# Patient Record
Sex: MALE | Race: WHITE | NOT HISPANIC OR LATINO | Employment: OTHER | URBAN - NONMETROPOLITAN AREA
[De-identification: names, ages, dates, MRNs, and addresses within clinical notes are randomized per-mention and may not be internally consistent; named-entity substitution may affect disease eponyms.]

---

## 2017-12-13 ENCOUNTER — APPOINTMENT (OUTPATIENT)
Dept: GENERAL RADIOLOGY | Facility: HOSPITAL | Age: 72
End: 2017-12-13

## 2017-12-13 ENCOUNTER — HOSPITAL ENCOUNTER (INPATIENT)
Facility: HOSPITAL | Age: 72
LOS: 7 days | Discharge: NURSING, REHAB OR LONG-TERM CARE | End: 2017-12-20
Attending: EMERGENCY MEDICINE | Admitting: FAMILY MEDICINE

## 2017-12-13 DIAGNOSIS — J44.1 COPD EXACERBATION (HCC): ICD-10-CM

## 2017-12-13 DIAGNOSIS — R77.8 ELEVATED TROPONIN: Primary | ICD-10-CM

## 2017-12-13 LAB
6-ACETYL MORPHINE: NEGATIVE
A-A DO2: 14.8 MMHG (ref 0–300)
ALBUMIN SERPL-MCNC: 4.3 G/DL (ref 3.4–4.8)
ALBUMIN/GLOB SERPL: 1.4 G/DL (ref 1.5–2.5)
ALP SERPL-CCNC: 75 U/L (ref 40–129)
ALT SERPL W P-5'-P-CCNC: 41 U/L (ref 10–44)
AMPHET+METHAMPHET UR QL: NEGATIVE
ANION GAP SERPL CALCULATED.3IONS-SCNC: 9.3 MMOL/L (ref 3.6–11.2)
ARTERIAL PATENCY WRIST A: POSITIVE
AST SERPL-CCNC: 40 U/L (ref 10–34)
ATMOSPHERIC PRESS: 719 MMHG
BACTERIA UR QL AUTO: NORMAL /HPF
BARBITURATES UR QL SCN: NEGATIVE
BASE EXCESS BLDA CALC-SCNC: -3.3 MMOL/L
BASOPHILS # BLD AUTO: 0.03 10*3/MM3 (ref 0–0.3)
BASOPHILS NFR BLD AUTO: 0.2 % (ref 0–2)
BDY SITE: ABNORMAL
BENZODIAZ UR QL SCN: NEGATIVE
BILIRUB SERPL-MCNC: 0.4 MG/DL (ref 0.2–1.8)
BILIRUB UR QL STRIP: NEGATIVE
BNP SERPL-MCNC: 827 PG/ML (ref 0–100)
BODY TEMPERATURE: 98.6 C
BUN BLD-MCNC: 19 MG/DL (ref 7–21)
BUN/CREAT SERPL: 19.6 (ref 7–25)
BUPRENORPHINE SERPL-MCNC: NEGATIVE NG/ML
CALCIUM SPEC-SCNC: 8.6 MG/DL (ref 7.7–10)
CANNABINOIDS SERPL QL: NEGATIVE
CHLORIDE SERPL-SCNC: 109 MMOL/L (ref 99–112)
CK MB SERPL-CCNC: 3.72 NG/ML (ref 0–5)
CK MB SERPL-RTO: 5.7 % (ref 0–3)
CK SERPL-CCNC: 65 U/L (ref 24–204)
CLARITY UR: CLEAR
CO2 SERPL-SCNC: 21.7 MMOL/L (ref 24.3–31.9)
COCAINE UR QL: NEGATIVE
COHGB MFR BLD: 1.2 % (ref 0–5)
COLOR UR: YELLOW
CREAT BLD-MCNC: 0.97 MG/DL (ref 0.43–1.29)
D-LACTATE SERPL-SCNC: 2 MMOL/L (ref 0.5–2)
DEPRECATED RDW RBC AUTO: 45.2 FL (ref 37–54)
EOSINOPHIL # BLD AUTO: 0.3 10*3/MM3 (ref 0–0.7)
EOSINOPHIL NFR BLD AUTO: 2.3 % (ref 0–7)
ERYTHROCYTE [DISTWIDTH] IN BLOOD BY AUTOMATED COUNT: 13.3 % (ref 11.5–14.5)
FLUAV AG NPH QL: NEGATIVE
FLUBV AG NPH QL IA: NEGATIVE
GFR SERPL CREATININE-BSD FRML MDRD: 77 ML/MIN/1.73
GLOBULIN UR ELPH-MCNC: 3 GM/DL
GLUCOSE BLD-MCNC: 125 MG/DL (ref 70–110)
GLUCOSE UR STRIP-MCNC: NEGATIVE MG/DL
HCO3 BLDA-SCNC: 22.6 MMOL/L (ref 22–26)
HCT VFR BLD AUTO: 47.8 % (ref 42–52)
HCT VFR BLD CALC: 50 % (ref 42–52)
HGB BLD-MCNC: 16.1 G/DL (ref 14–18)
HGB BLDA-MCNC: 16.9 G/DL (ref 12–16)
HGB UR QL STRIP.AUTO: NEGATIVE
HOROWITZ INDEX BLD+IHG-RTO: 21 %
HYALINE CASTS UR QL AUTO: NORMAL /LPF
IMM GRANULOCYTES # BLD: 0.03 10*3/MM3 (ref 0–0.03)
IMM GRANULOCYTES NFR BLD: 0.2 % (ref 0–0.5)
KETONES UR QL STRIP: NEGATIVE
LEUKOCYTE ESTERASE UR QL STRIP.AUTO: NEGATIVE
LYMPHOCYTES # BLD AUTO: 4.56 10*3/MM3 (ref 1–3)
LYMPHOCYTES NFR BLD AUTO: 35.2 % (ref 16–46)
MCH RBC QN AUTO: 32.3 PG (ref 27–33)
MCHC RBC AUTO-ENTMCNC: 33.7 G/DL (ref 33–37)
MCV RBC AUTO: 96 FL (ref 80–94)
METHADONE UR QL SCN: NEGATIVE
METHGB BLD QL: 0.3 % (ref 0–3)
MODALITY: ABNORMAL
MONOCYTES # BLD AUTO: 0.8 10*3/MM3 (ref 0.1–0.9)
MONOCYTES NFR BLD AUTO: 6.2 % (ref 0–12)
NEUTROPHILS # BLD AUTO: 7.24 10*3/MM3 (ref 1.4–6.5)
NEUTROPHILS NFR BLD AUTO: 55.9 % (ref 40–75)
NITRITE UR QL STRIP: NEGATIVE
OPIATES UR QL: NEGATIVE
OSMOLALITY SERPL CALC.SUM OF ELEC: 283.1 MOSM/KG (ref 273–305)
OXYCODONE UR QL SCN: NEGATIVE
OXYHGB MFR BLDV: 93.4 % (ref 85–100)
PCO2 BLDA: 43.2 MM HG (ref 35–45)
PCP UR QL SCN: NEGATIVE
PH BLDA: 7.34 PH UNITS (ref 7.35–7.45)
PH UR STRIP.AUTO: <=5 [PH] (ref 5–8)
PLATELET # BLD AUTO: 186 10*3/MM3 (ref 130–400)
PMV BLD AUTO: 10.3 FL (ref 6–10)
PO2 BLDA: 74.6 MM HG (ref 80–100)
POTASSIUM BLD-SCNC: 4.2 MMOL/L (ref 3.5–5.3)
PROT SERPL-MCNC: 7.3 G/DL (ref 6–8)
PROT UR QL STRIP: ABNORMAL
RBC # BLD AUTO: 4.98 10*6/MM3 (ref 4.7–6.1)
RBC # UR: NORMAL /HPF
REF LAB TEST METHOD: NORMAL
SAO2 % BLDCOA: 94.8 % (ref 90–100)
SODIUM BLD-SCNC: 140 MMOL/L (ref 135–153)
SP GR UR STRIP: 1.01 (ref 1–1.03)
SQUAMOUS #/AREA URNS HPF: NORMAL /HPF
TROPONIN I SERPL-MCNC: 0.11 NG/ML
TROPONIN I SERPL-MCNC: 0.26 NG/ML
TROPONIN I SERPL-MCNC: 0.52 NG/ML
TROPONIN I SERPL-MCNC: 0.53 NG/ML
UROBILINOGEN UR QL STRIP: ABNORMAL
WBC NRBC COR # BLD: 12.96 10*3/MM3 (ref 4.5–12.5)
WBC UR QL AUTO: NORMAL /HPF

## 2017-12-13 PROCEDURE — 25010000002 METHYLPREDNISOLONE PER 125 MG: Performed by: FAMILY MEDICINE

## 2017-12-13 PROCEDURE — 71020 HC CHEST PA AND LATERAL: CPT

## 2017-12-13 PROCEDURE — 36415 COLL VENOUS BLD VENIPUNCTURE: CPT

## 2017-12-13 PROCEDURE — 80307 DRUG TEST PRSMV CHEM ANLYZR: CPT | Performed by: PHYSICIAN ASSISTANT

## 2017-12-13 PROCEDURE — 82550 ASSAY OF CK (CPK): CPT | Performed by: PHYSICIAN ASSISTANT

## 2017-12-13 PROCEDURE — 94799 UNLISTED PULMONARY SVC/PX: CPT

## 2017-12-13 PROCEDURE — 36600 WITHDRAWAL OF ARTERIAL BLOOD: CPT | Performed by: EMERGENCY MEDICINE

## 2017-12-13 PROCEDURE — 94640 AIRWAY INHALATION TREATMENT: CPT

## 2017-12-13 PROCEDURE — 83605 ASSAY OF LACTIC ACID: CPT | Performed by: PHYSICIAN ASSISTANT

## 2017-12-13 PROCEDURE — 90732 PPSV23 VACC 2 YRS+ SUBQ/IM: CPT | Performed by: FAMILY MEDICINE

## 2017-12-13 PROCEDURE — 83050 HGB METHEMOGLOBIN QUAN: CPT | Performed by: EMERGENCY MEDICINE

## 2017-12-13 PROCEDURE — 25810000003 SODIUM CHLORIDE 0.9 % WITH KCL 20 MEQ 20-0.9 MEQ/L-% SOLUTION: Performed by: FAMILY MEDICINE

## 2017-12-13 PROCEDURE — 93005 ELECTROCARDIOGRAM TRACING: CPT | Performed by: PHYSICIAN ASSISTANT

## 2017-12-13 PROCEDURE — 84484 ASSAY OF TROPONIN QUANT: CPT | Performed by: FAMILY MEDICINE

## 2017-12-13 PROCEDURE — 82805 BLOOD GASES W/O2 SATURATION: CPT | Performed by: EMERGENCY MEDICINE

## 2017-12-13 PROCEDURE — G0009 ADMIN PNEUMOCOCCAL VACCINE: HCPCS | Performed by: FAMILY MEDICINE

## 2017-12-13 PROCEDURE — 87040 BLOOD CULTURE FOR BACTERIA: CPT | Performed by: PHYSICIAN ASSISTANT

## 2017-12-13 PROCEDURE — 25010000002 PNEUMOCOCCAL VAC POLYVALENT PER 0.5 ML: Performed by: FAMILY MEDICINE

## 2017-12-13 PROCEDURE — 71020 XR CHEST 2 VW: CPT | Performed by: RADIOLOGY

## 2017-12-13 PROCEDURE — 82375 ASSAY CARBOXYHB QUANT: CPT | Performed by: EMERGENCY MEDICINE

## 2017-12-13 PROCEDURE — 80053 COMPREHEN METABOLIC PANEL: CPT | Performed by: PHYSICIAN ASSISTANT

## 2017-12-13 PROCEDURE — 81001 URINALYSIS AUTO W/SCOPE: CPT | Performed by: PHYSICIAN ASSISTANT

## 2017-12-13 PROCEDURE — 99285 EMERGENCY DEPT VISIT HI MDM: CPT

## 2017-12-13 PROCEDURE — 25010000002 ENOXAPARIN PER 10 MG: Performed by: FAMILY MEDICINE

## 2017-12-13 PROCEDURE — 83880 ASSAY OF NATRIURETIC PEPTIDE: CPT | Performed by: PHYSICIAN ASSISTANT

## 2017-12-13 PROCEDURE — 82553 CREATINE MB FRACTION: CPT | Performed by: PHYSICIAN ASSISTANT

## 2017-12-13 PROCEDURE — 85025 COMPLETE CBC W/AUTO DIFF WBC: CPT | Performed by: PHYSICIAN ASSISTANT

## 2017-12-13 PROCEDURE — 87804 INFLUENZA ASSAY W/OPTIC: CPT | Performed by: PHYSICIAN ASSISTANT

## 2017-12-13 PROCEDURE — 84484 ASSAY OF TROPONIN QUANT: CPT | Performed by: PHYSICIAN ASSISTANT

## 2017-12-13 RX ORDER — ASPIRIN 325 MG
325 TABLET ORAL DAILY
Status: DISCONTINUED | OUTPATIENT
Start: 2017-12-13 | End: 2017-12-13

## 2017-12-13 RX ORDER — LISINOPRIL 10 MG/1
20 TABLET ORAL DAILY
Status: DISCONTINUED | OUTPATIENT
Start: 2017-12-14 | End: 2017-12-19

## 2017-12-13 RX ORDER — ALBUTEROL SULFATE 2.5 MG/3ML
2.5 SOLUTION RESPIRATORY (INHALATION) EVERY 6 HOURS PRN
Status: DISCONTINUED | OUTPATIENT
Start: 2017-12-13 | End: 2017-12-20 | Stop reason: HOSPADM

## 2017-12-13 RX ORDER — METOPROLOL SUCCINATE 50 MG/1
50 TABLET, EXTENDED RELEASE ORAL DAILY
COMMUNITY

## 2017-12-13 RX ORDER — SODIUM CHLORIDE 0.9 % (FLUSH) 0.9 %
1-10 SYRINGE (ML) INJECTION AS NEEDED
Status: DISCONTINUED | OUTPATIENT
Start: 2017-12-13 | End: 2017-12-20 | Stop reason: HOSPADM

## 2017-12-13 RX ORDER — PREDNISONE 10 MG/1
10 TABLET ORAL DAILY PRN
Status: CANCELLED | OUTPATIENT
Start: 2017-12-13

## 2017-12-13 RX ORDER — IPRATROPIUM BROMIDE AND ALBUTEROL SULFATE 2.5; .5 MG/3ML; MG/3ML
3 SOLUTION RESPIRATORY (INHALATION) ONCE
Status: COMPLETED | OUTPATIENT
Start: 2017-12-13 | End: 2017-12-13

## 2017-12-13 RX ORDER — ASPIRIN 81 MG/1
324 TABLET, CHEWABLE ORAL ONCE
Status: COMPLETED | OUTPATIENT
Start: 2017-12-13 | End: 2017-12-13

## 2017-12-13 RX ORDER — SODIUM CHLORIDE 0.9 % (FLUSH) 0.9 %
10 SYRINGE (ML) INJECTION AS NEEDED
Status: DISCONTINUED | OUTPATIENT
Start: 2017-12-13 | End: 2017-12-20 | Stop reason: HOSPADM

## 2017-12-13 RX ORDER — DOXYCYCLINE 100 MG/1
100 CAPSULE ORAL ONCE
Status: COMPLETED | OUTPATIENT
Start: 2017-12-13 | End: 2017-12-13

## 2017-12-13 RX ORDER — ASPIRIN 325 MG
325 TABLET ORAL DAILY
COMMUNITY

## 2017-12-13 RX ORDER — LISINOPRIL 10 MG/1
10 TABLET ORAL DAILY
COMMUNITY

## 2017-12-13 RX ORDER — PREDNISONE 10 MG/1
10 TABLET ORAL DAILY PRN
COMMUNITY

## 2017-12-13 RX ORDER — ALBUTEROL SULFATE 90 UG/1
2 AEROSOL, METERED RESPIRATORY (INHALATION) EVERY 6 HOURS PRN
COMMUNITY

## 2017-12-13 RX ORDER — LISINOPRIL 10 MG/1
10 TABLET ORAL DAILY
Status: DISCONTINUED | OUTPATIENT
Start: 2017-12-13 | End: 2017-12-13

## 2017-12-13 RX ORDER — METOPROLOL SUCCINATE 50 MG/1
50 TABLET, EXTENDED RELEASE ORAL DAILY
Status: DISCONTINUED | OUTPATIENT
Start: 2017-12-13 | End: 2017-12-16

## 2017-12-13 RX ORDER — IPRATROPIUM BROMIDE AND ALBUTEROL SULFATE 2.5; .5 MG/3ML; MG/3ML
3 SOLUTION RESPIRATORY (INHALATION)
Status: DISCONTINUED | OUTPATIENT
Start: 2017-12-13 | End: 2017-12-15

## 2017-12-13 RX ORDER — METHYLPREDNISOLONE SODIUM SUCCINATE 125 MG/2ML
125 INJECTION, POWDER, LYOPHILIZED, FOR SOLUTION INTRAMUSCULAR; INTRAVENOUS ONCE
Status: DISCONTINUED | OUTPATIENT
Start: 2017-12-13 | End: 2017-12-13

## 2017-12-13 RX ORDER — FUROSEMIDE 20 MG/1
20 TABLET ORAL DAILY
Status: DISCONTINUED | OUTPATIENT
Start: 2017-12-13 | End: 2017-12-16

## 2017-12-13 RX ORDER — SODIUM CHLORIDE AND POTASSIUM CHLORIDE 150; 900 MG/100ML; MG/100ML
100 INJECTION, SOLUTION INTRAVENOUS CONTINUOUS
Status: DISCONTINUED | OUTPATIENT
Start: 2017-12-13 | End: 2017-12-15

## 2017-12-13 RX ORDER — ASPIRIN 81 MG/1
81 TABLET ORAL DAILY
Status: DISCONTINUED | OUTPATIENT
Start: 2017-12-14 | End: 2017-12-20 | Stop reason: HOSPADM

## 2017-12-13 RX ORDER — DOXYCYCLINE 100 MG/1
CAPSULE ORAL
Status: COMPLETED
Start: 2017-12-13 | End: 2017-12-13

## 2017-12-13 RX ORDER — SPIRONOLACTONE 25 MG/1
25 TABLET ORAL DAILY
COMMUNITY

## 2017-12-13 RX ORDER — METHYLPREDNISOLONE SODIUM SUCCINATE 125 MG/2ML
60 INJECTION, POWDER, LYOPHILIZED, FOR SOLUTION INTRAMUSCULAR; INTRAVENOUS EVERY 6 HOURS SCHEDULED
Status: DISCONTINUED | OUTPATIENT
Start: 2017-12-13 | End: 2017-12-15

## 2017-12-13 RX ORDER — SPIRONOLACTONE 25 MG/1
25 TABLET ORAL DAILY
Status: DISCONTINUED | OUTPATIENT
Start: 2017-12-13 | End: 2017-12-19

## 2017-12-13 RX ORDER — FUROSEMIDE 20 MG/1
20 TABLET ORAL DAILY
COMMUNITY

## 2017-12-13 RX ADMIN — IPRATROPIUM BROMIDE AND ALBUTEROL SULFATE 3 ML: .5; 3 SOLUTION RESPIRATORY (INHALATION) at 07:25

## 2017-12-13 RX ADMIN — IPRATROPIUM BROMIDE AND ALBUTEROL SULFATE 3 ML: .5; 3 SOLUTION RESPIRATORY (INHALATION) at 12:55

## 2017-12-13 RX ADMIN — ASPIRIN 325 MG: 325 TABLET ORAL at 12:02

## 2017-12-13 RX ADMIN — DOXYCYCLINE 100 MG: 100 CAPSULE ORAL at 06:27

## 2017-12-13 RX ADMIN — METHYLPREDNISOLONE SODIUM SUCCINATE 60 MG: 125 INJECTION, POWDER, FOR SOLUTION INTRAMUSCULAR; INTRAVENOUS at 12:00

## 2017-12-13 RX ADMIN — ENOXAPARIN SODIUM 40 MG: 40 INJECTION SUBCUTANEOUS at 09:00

## 2017-12-13 RX ADMIN — METHYLPREDNISOLONE SODIUM SUCCINATE 60 MG: 125 INJECTION, POWDER, FOR SOLUTION INTRAMUSCULAR; INTRAVENOUS at 08:41

## 2017-12-13 RX ADMIN — PNEUMOCOCCAL VACCINE POLYVALENT 0.5 ML
25; 25; 25; 25; 25; 25; 25; 25; 25; 25; 25; 25; 25; 25; 25; 25; 25; 25; 25; 25; 25; 25; 25 INJECTION, SOLUTION INTRAMUSCULAR; SUBCUTANEOUS at 08:42

## 2017-12-13 RX ADMIN — SODIUM CHLORIDE AND POTASSIUM CHLORIDE 100 ML/HR: 9; 1.49 INJECTION, SOLUTION INTRAVENOUS at 08:42

## 2017-12-13 RX ADMIN — FUROSEMIDE 20 MG: 20 TABLET ORAL at 12:03

## 2017-12-13 RX ADMIN — SPIRONOLACTONE 25 MG: 25 TABLET ORAL at 12:03

## 2017-12-13 RX ADMIN — METHYLPREDNISOLONE SODIUM SUCCINATE 60 MG: 125 INJECTION, POWDER, FOR SOLUTION INTRAMUSCULAR; INTRAVENOUS at 18:00

## 2017-12-13 RX ADMIN — METOPROLOL SUCCINATE 50 MG: 50 TABLET, FILM COATED, EXTENDED RELEASE ORAL at 12:03

## 2017-12-13 RX ADMIN — IPRATROPIUM BROMIDE AND ALBUTEROL SULFATE 3 ML: .5; 3 SOLUTION RESPIRATORY (INHALATION) at 19:03

## 2017-12-13 RX ADMIN — IPRATROPIUM BROMIDE AND ALBUTEROL SULFATE 3 ML: .5; 3 SOLUTION RESPIRATORY (INHALATION) at 03:06

## 2017-12-13 RX ADMIN — ASPIRIN 324 MG: 81 TABLET, CHEWABLE ORAL at 04:21

## 2017-12-13 RX ADMIN — SODIUM CHLORIDE AND POTASSIUM CHLORIDE 100 ML/HR: 9; 1.49 INJECTION, SOLUTION INTRAVENOUS at 20:39

## 2017-12-13 RX ADMIN — LISINOPRIL 10 MG: 10 TABLET ORAL at 12:03

## 2017-12-13 NOTE — PLAN OF CARE
Problem: Patient Care Overview (Adult)  Goal: Plan of Care Review  Outcome: Ongoing (interventions implemented as appropriate)    Problem: Pain, Acute (Adult)  Goal: Identify Related Risk Factors and Signs and Symptoms  Outcome: Outcome(s) achieved Date Met:  12/13/17  Goal: Acceptable Pain Control/Comfort Level  Outcome: Ongoing (interventions implemented as appropriate)    Problem: Cardiac Output, Decreased (Adult)  Goal: Identify Related Risk Factors and Signs and Symptoms  Outcome: Outcome(s) achieved Date Met:  12/13/17  Goal: Adequate Cardiac Output/Effective Tissue Perfusion  Outcome: Ongoing (interventions implemented as appropriate)

## 2017-12-13 NOTE — ED NOTES
Patient updated on POC and reason for ASA. Explained that first cardiac was slightly high and that there would be a redraw in an hour. Patient states understanding. NADN. WCTM. Continues to have some expiratory wheezing. Noted to be NSR with a rate of 82 at this time. Grandson is at bedside and verbalizes understanding. Call light within reach.      Any Nair RN  12/13/17 8005

## 2017-12-13 NOTE — ED NOTES
Patient states that he was seen by his PCP last week related to a cough and beginnings of a URI. States that he was placed on medication. States that he has progressively got worse in the last 24 hours. Patient denies any fever. Noted to have a productive cough. Sputum noted to be green. Patient states that he has been seen here multiple times related to the same problem. States that he is only able to breath if he sits straight up. NADN. WCTM. Resps noted to be uneven and labored at this time. Encouraged patient to take slow breaths, in through his mouth and and out through his nose. Grandson noted to be at bedside. Call light within reach.      Any Nair RN  12/13/17 6793

## 2017-12-13 NOTE — ED NOTES
Patient updated on POC and related to admission. Alert and oriented X 4. Updated that second cardiac enzyme was elevated and would be admitted for tele. Made aware that they would monitor for a few days and have a possible stress test. Very appreciative of care. Coffee requested by patient and gave to patient at this time.      Any Nair RN  12/13/17 0593

## 2017-12-13 NOTE — ED NOTES
Patient states that he is breathing much better at this time. Patient is able to talk in full sentences at this time. Grandson remains at bedside. NADN. Patient updated on POC and encouraged to provide a urine specimen when able. Verbalized understanding.      Any Nair RN  12/13/17 2149

## 2017-12-13 NOTE — CONSULTS
Referring Provider: -Dr. Nelson Ash      Reason for Consultation: -Elevated cardiac markers    Chief complaint -shortness of breath and cough      History of present illness:      72-year-old male has been admitted with history of increasing shortness of breath and cough with the diagnosis of exacerbation of COPD.    I have been consulted because of indeterminant troponin I.  I reviewed the troponin I level.  It varies from 0.1 to 0.5.  ECG showed sinus tachycardia and evidence of LVH.  There was no evidence of ischemia.  CPK level was normal.  Patient has no history of chest pain or angina.    Patient has history of dyspnea on exertion functional class II.  Due to COPD.  No history of PND, orthopnea or leg edema.  No history of dizziness, palpitation or syncope.    Patient has history of CHF in the past.  EF-not known.  According to the patient, he hadn't coronary arteriogram done about 4 years ago and it showed no significant disease.  History of prior MI.  Details not known    Cardiac risk factors-history of hypertension, smoking and family history of heart disease      Review of Systems     Constitutional-tiredness  ENT-none  Cardiovascular-as above  Respiratory-as above  GI-stomach problem  Musculoskeletal-arthritis pain  Review of father organ system involvement-negative    History  Past Medical History:   Diagnosis Date   • CHF (congestive heart failure)    • COPD (chronic obstructive pulmonary disease)    • Hypertension    • MI (myocardial infarction)    • Stroke    , History reviewed. No pertinent surgical history., History reviewed. No pertinent family history., Social History   Substance Use Topics   • Smoking status: Former Smoker     Years: 55.00     Types: Cigarettes   • Smokeless tobacco: Current User      Comment: trying to quit   • Alcohol use No   ,     Medication Review:      [START ON 12/14/2017] aspirin 81 mg Oral Daily   enoxaparin 40 mg Subcutaneous Q24H   furosemide 20 mg Oral Daily  "  ipratropium-albuterol 3 mL Nebulization 4x Daily - RT   [START ON 12/14/2017] lisinopril 20 mg Oral Daily   methylPREDNISolone sodium succinate 60 mg Intravenous Q6H   metoprolol succinate XL 50 mg Oral Daily   spironolactone 25 mg Oral Daily        Allergies:  Review of patient's allergies indicates no known allergies.    Objective     Vital Sign Min/Max for last 24 hours  Temp  Min: 96.4 °F (35.8 °C)  Max: 97.8 °F (36.6 °C)   BP  Min: 142/92  Max: 233/143   Pulse  Min: 65  Max: 135   Resp  Min: 20  Max: 30   SpO2  Min: 89 %  Max: 100 %   No Data Recorded   Weight  Min: 88.5 kg (195 lb)  Max: 90.5 kg (199 lb 9 oz)     Flowsheet Rows         First Filed Value    Admission Height  167.6 cm (66\") Documented at 12/13/2017 0250    Admission Weight  88.5 kg (195 lb) Documented at 12/13/2017 0250             Physical Exam:     General Appearance:    Alert, cooperative, in no acute distress   Head:    Normocephalic, without obvious abnormality, atraumatic   Eyes:            Lids and lashes normal, conjunctivae and sclerae normal, no   icterus, no pallor, corneas clear, PERRLA   Ears:    Ears appear intact with no abnormalities noted   Throat:   No oral lesions, no thrush, oral mucosa moist   Neck:   No adenopathy, supple, trachea midline, no thyromegaly, no   carotid bruit, no JVD   Back:     No kyphosis present, no scoliosis present, no skin lesions,      erythema or scars, no tenderness to percussion or                   palpation,   range of motion normal   Lungs:     Clear to auscultation,respirations regular, even and                  unlabored    Heart:    Regular rhythm and normal rate, normal S1 and S2, no            murmur, no gallop, no rub, no click   Chest Wall:    No abnormalities observed   Abdomen:     Normal bowel sounds, no masses, no organomegaly, soft        non-tender, non-distended, no guarding, no rebound                tenderness   Rectal:     Deferred   Extremities:   Moves all extremities well, " no edema, no cyanosis, no             redness   Pulses:   Pulses palpable and equal bilaterally   Skin:   No bleeding, bruising or rash   Lymph nodes:   No palpable adenopathy   Neurologic:   Cranial nerves 2 - 12 grossly intact, sensation intact, DTR       present and equal bilaterally       Telemetry  Normal sinus rhythm    ECG  ECG/EMG Results (last 24 hours)     Procedure Component Value Units Date/Time    ECG 12 Lead [[id number]] Collected:  12/13/17 0300     Updated:  12/13/17 1459    Narrative:       Test Reason : SOB  Blood Pressure : **/** mmHG  Vent. Rate : 108 BPM     Atrial Rate : 108 BPM     P-R Int : 160 ms          QRS Dur : 084 ms      QT Int : 354 ms       P-R-T Axes : 000 040 163 degrees     QTc Int : 474 ms    Sinus tachycardia  Left ventricular hypertrophy with repolarization abnormality  Abnormal ECG  No previous ECGs available  Confirmed by Mann Romero ([id number]) on 12/13/2017 2:59:51 PM    Referred By:  CECY           Confirmed By:Mann Romero            Labs    Results from last 7 days  Lab Units 12/13/17  0313   WBC 10*3/mm3 12.96*   HEMOGLOBIN g/dL 16.1   HEMATOCRIT % 47.8   PLATELETS 10*3/mm3 186       Results from last 7 days  Lab Units 12/13/17  0313   SODIUM mmol/L 140   POTASSIUM mmol/L 4.2   CHLORIDE mmol/L 109   CO2 mmol/L 21.7*   BUN mg/dL 19   CREATININE mg/dL 0.97   CALCIUM mg/dL 8.6   GLUCOSE mg/dL 125*       Results from last 7 days  Lab Units 12/13/17  0313   BILIRUBIN mg/dL 0.4   ALK PHOS U/L 75   AST (SGOT) U/L 40*   ALT (SGPT) U/L 41                   Results from last 7 days  Lab Units 12/13/17  1201 12/13/17  0517 12/13/17  0313   CK TOTAL U/L  --  65  --    TROPONIN I ng/mL 0.519* 0.255* 0.106*   CK MB INDEX %  --  5.7*  --        Results from last 7 days  Lab Units 12/13/17  0308   PH, ARTERIAL pH units 7.336*   PO2 ART mm Hg 74.6*   PCO2, ARTERIAL mm Hg 43.2   HCO3 ART mmol/L 22.6         Imaging  Imaging Results (last 24 hours)     Procedure Component Value  Units Date/Time    XR Chest 2 View [[id number]] Collected:  12/13/17 0724     Updated:  12/13/17 0727    Narrative:       EXAMINATION: XR CHEST 2 VW-      CLINICAL INDICATION: SOB          COMPARISON: 8/18/2015      TECHNIQUE: XR CHEST 2 VW-      FINDINGS:   Vague increased density in the right upper lobe. I cannot exclude an  underlying nodule   Heart and mediastinal contours are unremarkable.   No pneumothorax.   Bony and soft tissue structures are unremarkable.            Impression:       Cannot exclude right upper lobe nodule or airspace disease     This report was finalized on 12/13/2017 7:25 AM by Dr. Jean-Claude Bermeo MD.               Assessment     1.  Troponin I-indeterminate level.  Pattern does not suggest MI.  No history of chest pain.  ECG showed no evidence of ischemia.    2.  History of CHF.  Clinically compensated.  EF-not known    3.  LVH by EKG criteria    4.  Hypertension-uncontrolled    5.  Exacerbation of COPD    Plan    1.  Medications-  Continue aspirin, metoprolol ER 50 mg by mouth daily and increase lisinopril to 20 mg by mouth daily 4 uncontrolled hypertension and furosemide 20 mg by mouth daily    2.  Echocardiogram has been ordered to evaluate left ventricular wall thickness and LV function    3.  A Lexiscan myocardial perfusion study will be done to rule out occult ischemia in this patient with history of CHF-?  Angina equivalent and multiple cardiac risk factors    4.  Patient was instructed to stop smoking      I discussed the patients findings and my recommendations with patient       Thanks Kathryn for the consult and for involving me in the care of your patient      Mann Romero MD  12/13/17  5:56 PM

## 2017-12-13 NOTE — PROGRESS NOTES
Discharge Planning Assessment  [facility]     Patient Name: Salomón Rodriguez  MRN: [id number]  Today's Date: 12/13/2017    Admit Date: 12/13/2017          Discharge Needs Assessment       12/13/17 1519    Living Environment    Lives With child(ashley), adult    Living Arrangements house    Home Accessibility no concerns    Stair Railings at Home none    Type of Financial/Environmental Concern none    Transportation Available car    Living Environment    Provides Primary Care For no one    Primary Care Provided By child(ashley) (specify)    Quality Of Family Relationships supportive    Able to Return to Prior Living Arrangements yes            Discharge Plan       12/13/17 1519    Case Management/Social Work Plan    Plan Pt admitted on 12/13/17.  SS received consult per ns for discharge planning.  SS spoke with pt on this date.  Pt lives at home with family and plans to return home at discharge.  Pt currently does not utilize home health and DME.  Pt states no Living Will or POA and does not want any additional information.  Pt utilizes [facility] on [facility].  Pt's PCP is Dr. Macias.  SS will follow and assist with discharge needs.     Patient/Family In Agreement With Plan yes        Discharge Placement     No information found                Demographic Summary       12/13/17 1518    Referral Information    Admission Type inpatient    Arrived From admitted as an inpatient    Referral Source nursing    Reason For Consult discharge planning            Functional Status     None            Psychosocial     None            Abuse/Neglect     None            Legal     None            Substance Abuse     None            Patient Forms     None          Geneva Vargas

## 2017-12-13 NOTE — H&P
106        Salomón Rodriguez    Patient Care Team:  Federico Macias MD as PCP - General  Federico Macias MD as PCP - Family Medicine  Federico Macias MD as PCP - Claims Attributed    Chief complaint acute on chronic exacerbation of COPD                                 Subjective     Patient is a 72 y.o. male presents with exacerbation of COPD with increased cough congestion and wheezing patient denied any chest pain he was evaluated in the emergency room had a routine evaluation with the initial set of cardiac enzymes that were in the gray zone the patient continued for breathing treatments and IV antibiotics nebulizer treatments moved to the medical floor on telemetry secondary to his elevated enzymes and will be treated concurrently and follow patient does have a history of a past MI some 4 years ago according to the patient was found to have no blockages on valuation at that time.     Review of Systems   Pertinent items are noted in HPI, all other systems reviewed and negative    History  Past Medical History:   Diagnosis Date   • CHF (congestive heart failure)    • COPD (chronic obstructive pulmonary disease)    • Hypertension    • MI (myocardial infarction)    • Stroke      History reviewed. No pertinent surgical history.  History reviewed. No pertinent family history.  Social History   Substance Use Topics   • Smoking status: Former Smoker     Years: 55.00     Types: Cigarettes   • Smokeless tobacco: Current User      Comment: trying to quit   • Alcohol use No     Prescriptions Prior to Admission   Medication Sig Dispense Refill Last Dose   • albuterol (PROVENTIL HFA;VENTOLIN HFA) 108 (90 Base) MCG/ACT inhaler Inhale 2 puffs Every 4 (Four) Hours As Needed for Wheezing.      • aspirin 325 MG tablet Take 325 mg by mouth Daily.      • furosemide (LASIX) 20 MG tablet Take 20 mg by mouth Daily.      • lisinopril (PRINIVIL,ZESTRIL) 10 MG tablet Take 10 mg by mouth Daily.      • metoprolol succinate XL  (TOPROL-XL) 50 MG 24 hr tablet Take 50 mg by mouth Daily.      • predniSONE (DELTASONE) 10 MG tablet Take 10 mg by mouth Daily As Needed.      • spironolactone (ALDACTONE) 25 MG tablet Take 25 mg by mouth Daily.        Allergies:  Review of patient's allergies indicates no known allergies.    Objective     Vital Signs  Vital Signs (last 24 hours)       12/11 0700  -  12/12 0659 12/12 0700  -  12/13 0658   Most Recent    Temp (°F)   96.4 -  97.7     97.7 (36.5)    Heart Rate   65 -  (!)135     76    Resp   22 -  (!)30     22    BP   142/92 -  (!) 233/143     142/92    SpO2 (%)   (!)89 -  100     95            Physical Exam:      General Appearance:    Alert, cooperative, in no acute distress   Head:    Normocephalic, without obvious abnormality, atraumatic   Eyes:            Lids and lashes normal, conjunctivae and sclerae normal, no   icterus, no pallor, corneas clear, PERRLA   Ears:    Ears appear intact with no abnormalities noted   Throat:   No oral lesions, no thrush, oral mucosa moist   Neck:   No adenopathy, supple, trachea midline, no thyromegaly, no     carotid bruit, no JVD   Back:     No kyphosis present, no scoliosis present, no skin lesions,       erythema or scars, no tenderness to percussion or                   palpation,   range of motion normal   Lungs:     Clear to auscultation,respirations regular, even and                   unlabored    Heart:    Regular rhythm and normal rate, normal S1 and S2, no            murmur, no gallop, no rub, no click   Breast Exam:    Deferred   Abdomen:     Normal bowel sounds, no masses, no organomegaly, soft        non-tender, non-distended, no guarding, no rebound                 tenderness   Genitalia:    Deferred   Extremities:   Moves all extremities well, no edema, no cyanosis, no              redness   Pulses:   Pulses palpable and equal bilaterally   Skin:   No bleeding, bruising or rash   Lymph nodes:   No palpable adenopathy   Neurologic:   Cranial nerves 2  - 12 grossly intact, sensation intact, DTR        present and equal bilaterally     LABS  Lab Results (last 24 hours)     Procedure Component Value Units Date/Time    Blood Gas, Arterial [[id number]]  (Abnormal) Collected:  12/13/17 0308    Specimen:  Arterial Blood Updated:  12/13/17 0310     Site Arterial: right radial     Ten's Test Positive     pH, Arterial 7.336 (L) pH units      pCO2, Arterial 43.2 mm Hg      pO2, Arterial 74.6 (L) mm Hg      HCO3, Arterial 22.6 mmol/L      Base Excess, Arterial -3.3 mmol/L      O2 Saturation, Arterial 94.8 %      Hemoglobin, Blood Gas 16.9 (H) g/dL      Hematocrit, Blood Gas 50.0 %      Oxyhemoglobin 93.4 %      Methemoglobin 0.30 %      Carboxyhemoglobin 1.2 %      A-a Gradiant 14.8 mmHg      Temperature 98.6 C      Barometric Pressure for Blood Gas 719 mmHg      Modality Room Air     FIO2 21 %     Blood Culture - Blood, [[id number]] Collected:  12/13/17 0313    Specimen:  Blood from Arm, Left Updated:  12/13/17 0318    CBC & Differential [[id number]] Collected:  12/13/17 0313    Specimen:  Blood Updated:  12/13/17 0324    Narrative:       The following orders were created for panel order CBC & Differential.  Procedure                               Abnormality         Status                     ---------                               -----------         ------                     CBC Auto Differential[[id number]]        Abnormal            Final result                 Please view results for these tests on the individual orders.    CBC Auto Differential [[id number]]  (Abnormal) Collected:  12/13/17 0313    Specimen:  Blood from Arm, Left Updated:  12/13/17 0324     WBC 12.96 (H) 10*3/mm3      RBC 4.98 10*6/mm3      Hemoglobin 16.1 g/dL      Hematocrit 47.8 %      MCV 96.0 (H) fL      MCH 32.3 pg      MCHC 33.7 g/dL      RDW 13.3 %      RDW-SD 45.2 fl      MPV 10.3 (H) fL      Platelets 186 10*3/mm3      Neutrophil % 55.9 %      Lymphocyte % 35.2 %      Monocyte % 6.2 %       Eosinophil % 2.3 %      Basophil % 0.2 %      Immature Grans % 0.2 %      Neutrophils, Absolute 7.24 (H) 10*3/mm3      Lymphocytes, Absolute 4.56 (H) 10*3/mm3      Monocytes, Absolute 0.80 10*3/mm3      Eosinophils, Absolute 0.30 10*3/mm3      Basophils, Absolute 0.03 10*3/mm3      Immature Grans, Absolute 0.03 10*3/mm3     Influenza Antigen, Rapid - Swab, Nasopharynx [[id number]]  (Normal) Collected:  12/13/17 0313    Specimen:  Swab from Nasopharynx Updated:  12/13/17 0331     Influenza A Ag, EIA Negative     Influenza B Ag, EIA Negative    Lactic Acid, Plasma [[id number]]  (Normal) Collected:  12/13/17 0313    Specimen:  Blood from Arm, Left Updated:  12/13/17 0347     Lactate 2.0 mmol/L     Blood Culture - Blood, [[id number]] Collected:  12/13/17 0348    Specimen:  Blood from Arm, Left Updated:  12/13/17 0351    Comprehensive Metabolic Panel [[id number]]  (Abnormal) Collected:  12/13/17 0313    Specimen:  Blood from Arm, Left Updated:  12/13/17 0353     Glucose 125 (H) mg/dL      BUN 19 mg/dL      Creatinine 0.97 mg/dL      Sodium 140 mmol/L      Potassium 4.2 mmol/L      Chloride 109 mmol/L      CO2 21.7 (L) mmol/L      Calcium 8.6 mg/dL      Total Protein 7.3 g/dL      Albumin 4.30 g/dL      ALT (SGPT) 41 U/L      AST (SGOT) 40 (H) U/L      Alkaline Phosphatase 75 U/L       Note New Reference Ranges        Total Bilirubin 0.4 mg/dL      eGFR Non African Amer 77 mL/min/1.73      Globulin 3.0 gm/dL      A/G Ratio 1.4 (L) g/dL      BUN/Creatinine Ratio 19.6     Anion Gap 9.3 mmol/L     Osmolality, Calculated [[id number]]  (Normal) Collected:  12/13/17 0313    Specimen:  Blood from Arm, Left Updated:  12/13/17 0353     Osmolality Calc 283.1 mOsm/kg     BNP [[id number]]  (Abnormal) Collected:  12/13/17 0313    Specimen:  Blood from Arm, Left Updated:  12/13/17 0359     .0 (H) pg/mL     Troponin [[id number]]  (Abnormal) Collected:  12/13/17 0313    Specimen:  Blood from Arm, Left Updated:  12/13/17 0401     Troponin  I 0.106 (H) ng/mL     Narrative:       Ultra Troponin I Reference Range:         <=0.039 ng/mL: Negative    0.04-0.779 ng/mL: Indeterminate Range. Suspicious of MI.  Clinical correlation required.       >=0.78  ng/mL: Consistent with myocardial injury.  Clinical correlation required.    Urinalysis With / Culture If Indicated - Urine, Clean Catch [[id number]]  (Abnormal) Collected:  12/13/17 0524    Specimen:  Urine from Urine, Clean Catch Updated:  12/13/17 0537     Color, UA Yellow     Appearance, UA Clear     pH, UA <=5.0     Specific Gravity, UA 1.013     Glucose, UA Negative     Ketones, UA Negative     Bilirubin, UA Negative     Blood, UA Negative     Protein, UA 30 mg/dL (1+) (A)     Leuk Esterase, UA Negative     Nitrite, UA Negative     Urobilinogen, UA 0.2 E.U./dL    Urinalysis, Microscopic Only - Urine, Clean Catch [[id number]] Collected:  12/13/17 0524    Specimen:  Urine from Urine, Clean Catch Updated:  12/13/17 0537     RBC, UA 0-2 /HPF      WBC, UA 0-2 /HPF      Bacteria, UA None Seen /HPF      Squamous Epithelial Cells, UA None Seen /HPF      Hyaline Casts, UA None Seen /LPF      Methodology Automated Microscopy    Urine Drug Screen - Urine, Clean Catch [[id number]]  (Normal) Collected:  12/13/17 0524    Specimen:  Urine from Urine, Clean Catch Updated:  12/13/17 0550     Amphetamine Screen, Urine Negative     Barbiturates Screen, Urine Negative     Benzodiazepine Screen, Urine Negative     Cocaine Screen, Urine Negative     Methadone Screen, Urine Negative     Opiate Screen Negative     Phencyclidine (PCP), Urine Negative     THC, Screen, Urine Negative     6-ACETYL MORPHINE Negative     Buprenorphine, Screen, Urine Negative     Oxycodone Screen, Urine Negative    Narrative:       Negative Thresholds For Drugs Screened:                  Amphetamines              1000 ng/ml               Barbiturates               200 ng/ml               Benzodiazepines            200 ng/ml              Cocaine                     300 ng/ml              Methadone                  300 ng/ml              Opiates                    300 ng/ml               Phencyclidine               25 ng/ml               THC                         50 ng/ml              6-Acetyl Morphine           10 ng/ml              Buprenorphine                5 ng/ml              Oxycodone                  300 ng/ml    The reference range for all drugs tested is negative. This report includes final unconfirmed qualitative results to be used for medical treatment purposes only. Unconfirmed results must not be used for non-medical purposes such as employment or legal testing. Clinical consideration should be applied to any drug of abuse test, especially when unconfirmed quantitative results are used.      CK [[id number]]  (Normal) Collected:  12/13/17 0517    Specimen:  Blood from Arm, Left Updated:  12/13/17 0604     Creatine Kinase 65 U/L     CK-MB [[id number]]  (Normal) Collected:  12/13/17 0517    Specimen:  Blood from Arm, Left Updated:  12/13/17 0604     CKMB 3.72 ng/mL     CK-MB Index [[id number]]  (Abnormal) Collected:  12/13/17 0517    Specimen:  Blood from Arm, Left Updated:  12/13/17 0604     CK-MB Index 5.7 (H) %     Troponin [[id number]]  (Abnormal) Collected:  12/13/17 0517    Specimen:  Blood from Arm, Left Updated:  12/13/17 0605     Troponin I 0.255 (H) ng/mL     Narrative:       Ultra Troponin I Reference Range:         <=0.039 ng/mL: Negative    0.04-0.779 ng/mL: Indeterminate Range. Suspicious of MI.  Clinical correlation required.       >=0.78  ng/mL: Consistent with myocardial injury.  Clinical correlation required.          RADIOLOGY  Imaging Results (last 24 hours)     Procedure Component Value Units Date/Time    XR Chest 2 View [[id number]] Resulted:  12/13/17 0403     Updated:  12/13/17 0403          Results Review:    I reviewed the patient's new clinical results.    Active Problems:    Elevated troponin        Assessment/Plan     1.  Acute  on chronic exacerbation of COPD  2.  Elevated cardiac enzyme gray zone  3.  Hypertension  4.  History of MI history of CVA  We'll go ahead and admit this patient follow him on telemetry we'll advance  diet as tolerated     ROCK Fleming  12/13/17  6:58 AM

## 2017-12-13 NOTE — ED PROVIDER NOTES
Subjective   Patient is a 72 y.o. male presenting with shortness of breath.   History provided by:  Patient   used: No    Shortness of Breath   Severity:  Moderate  Onset quality:  Sudden  Duration:  3 hours  Timing:  Constant  Progression:  Worsening  Chronicity:  Chronic  Relieved by:  Nothing  Worsened by:  Nothing  Ineffective treatments:  None tried  Associated symptoms: cough and wheezing    Risk factors: tobacco use    Risk factors: no recent alcohol use, no family hx of DVT, no hx of cancer, no hx of PE/DVT, no obesity, no oral contraceptive use, no prolonged immobilization and no recent surgery        Review of Systems   Constitutional: Negative.    HENT: Negative.    Eyes: Negative.    Respiratory: Positive for cough, shortness of breath and wheezing.    Cardiovascular: Negative.    Gastrointestinal: Negative.    Endocrine: Negative.    Genitourinary: Negative.    Musculoskeletal: Negative.    Skin: Negative.    Allergic/Immunologic: Negative.    Neurological: Negative.    Hematological: Negative.    Psychiatric/Behavioral: Negative.    All other systems reviewed and are negative.      Past Medical History:   Diagnosis Date   • COPD (chronic obstructive pulmonary disease)    • Hypertension        No Known Allergies    History reviewed. No pertinent surgical history.    History reviewed. No pertinent family history.    Social History     Social History   • Marital status:      Spouse name: N/A   • Number of children: N/A   • Years of education: N/A     Social History Main Topics   • Smoking status: Former Smoker   • Smokeless tobacco: Never Used   • Alcohol use No   • Drug use: No   • Sexual activity: Defer     Other Topics Concern   • None     Social History Narrative   • None           Objective   Physical Exam   Constitutional: He is oriented to person, place, and time. He appears well-developed and well-nourished.   HENT:   Head: Normocephalic and atraumatic.   Right Ear:  External ear normal.   Left Ear: External ear normal.   Nose: Nose normal.   Mouth/Throat: Oropharynx is clear and moist.   Eyes: EOM are normal. Pupils are equal, round, and reactive to light.   Neck: Normal range of motion. Neck supple.   Cardiovascular: Normal rate, regular rhythm, normal heart sounds and intact distal pulses.    Pulmonary/Chest: He has wheezes.   Labored breathing    Abdominal: Soft. Bowel sounds are normal.   Musculoskeletal: Normal range of motion.   Neurological: He is alert and oriented to person, place, and time.   Skin: Skin is warm. He is diaphoretic.   Psychiatric: He has a normal mood and affect. His behavior is normal. Judgment and thought content normal.   Nursing note and vitals reviewed.      Procedures         ED Course  ED Course   Value Comment By Time   ECG 12 Lead 12331- Reviewed by Dr. Cho, rate 108, sinus tachy, no ischemia ROCK Carranza 12/13 0302    Patient not septic- normal lactic acid, vitals stable, patient afebrile. ROCK Carranza 12/13 0406    Patient doing much better. He states he is no longer SOB and continues to remain chest pain free. Patient maintaining at and above 92% on RA. Patient no longer labored.  Patient sitting up on edge of bed, resting comfortably, talking to his grandson. ROCK Carranza 12/13 0613    Spoke with Dr. Nelson Ash - he will admit to telemetry for R/O.  No further orders. ROCK Carranza 12/13 0665                  [facility]    Final diagnoses:   Elevated troponin   COPD exacerbation            ROCK Carranza  12/13/17 0653

## 2017-12-14 ENCOUNTER — APPOINTMENT (OUTPATIENT)
Dept: NUCLEAR MEDICINE | Facility: HOSPITAL | Age: 72
End: 2017-12-14
Attending: INTERNAL MEDICINE

## 2017-12-14 ENCOUNTER — APPOINTMENT (OUTPATIENT)
Dept: CARDIOLOGY | Facility: HOSPITAL | Age: 72
End: 2017-12-14
Attending: INTERNAL MEDICINE

## 2017-12-14 LAB
ANION GAP SERPL CALCULATED.3IONS-SCNC: 8.3 MMOL/L (ref 3.6–11.2)
BASOPHILS # BLD AUTO: 0.01 10*3/MM3 (ref 0–0.3)
BASOPHILS NFR BLD AUTO: 0.1 % (ref 0–2)
BH CV ECHO MEAS - ACS: 2.3 CM
BH CV ECHO MEAS - AO MAX PG: 7.4 MMHG
BH CV ECHO MEAS - AO MEAN PG: 3.7 MMHG
BH CV ECHO MEAS - AO ROOT AREA (BSA CORRECTED): 1.8
BH CV ECHO MEAS - AO ROOT AREA: 11.2 CM^2
BH CV ECHO MEAS - AO ROOT DIAM: 3.8 CM
BH CV ECHO MEAS - AO V2 MAX: 135.7 CM/SEC
BH CV ECHO MEAS - AO V2 MEAN: 87.2 CM/SEC
BH CV ECHO MEAS - AO V2 VTI: 27.6 CM
BH CV ECHO MEAS - BSA(HAYCOCK): 2.1 M^2
BH CV ECHO MEAS - BSA: 2.1 M^2
BH CV ECHO MEAS - BZI_BMI: 29.8 KILOGRAMS/M^2
BH CV ECHO MEAS - BZI_METRIC_HEIGHT: 175.3 CM
BH CV ECHO MEAS - BZI_METRIC_WEIGHT: 91.6 KG
BH CV ECHO MEAS - CONTRAST EF 4CH: 38.3 ML/M^2
BH CV ECHO MEAS - EDV(CUBED): 186.3 ML
BH CV ECHO MEAS - EDV(MOD-SP4): 115 ML
BH CV ECHO MEAS - EDV(TEICH): 160.8 ML
BH CV ECHO MEAS - EF(CUBED): 7.5 %
BH CV ECHO MEAS - EF(TEICH): 5.8 %
BH CV ECHO MEAS - ESV(CUBED): 172.3 ML
BH CV ECHO MEAS - ESV(MOD-SP4): 71 ML
BH CV ECHO MEAS - ESV(TEICH): 151.4 ML
BH CV ECHO MEAS - FS: 2.6 %
BH CV ECHO MEAS - IVS/LVPW: 1.3
BH CV ECHO MEAS - IVSD: 1.4 CM
BH CV ECHO MEAS - LA DIMENSION: 4 CM
BH CV ECHO MEAS - LA/AO: 1.1
BH CV ECHO MEAS - LV DIASTOLIC VOL/BSA (35-75): 55.4 ML/M^2
BH CV ECHO MEAS - LV MASS(C)D: 311.8 GRAMS
BH CV ECHO MEAS - LV MASS(C)DI: 150.3 GRAMS/M^2
BH CV ECHO MEAS - LV SYSTOLIC VOL/BSA (12-30): 34.2 ML/M^2
BH CV ECHO MEAS - LVIDD: 5.7 CM
BH CV ECHO MEAS - LVIDS: 5.6 CM
BH CV ECHO MEAS - LVLD AP4: 8 CM
BH CV ECHO MEAS - LVLS AP4: 7.8 CM
BH CV ECHO MEAS - LVOT AREA (M): 3.5 CM^2
BH CV ECHO MEAS - LVOT AREA: 3.5 CM^2
BH CV ECHO MEAS - LVOT DIAM: 2.1 CM
BH CV ECHO MEAS - LVPWD: 1.1 CM
BH CV ECHO MEAS - MV A MAX VEL: 40 CM/SEC
BH CV ECHO MEAS - MV E MAX VEL: 131.6 CM/SEC
BH CV ECHO MEAS - MV E/A: 3.3
BH CV ECHO MEAS - PA ACC SLOPE: 1600 CM/SEC^2
BH CV ECHO MEAS - PA ACC TIME: 0.07 SEC
BH CV ECHO MEAS - PA PR(ACCEL): 47.3 MMHG
BH CV ECHO MEAS - RAP SYSTOLE: 10 MMHG
BH CV ECHO MEAS - RVSP: 37.2 MMHG
BH CV ECHO MEAS - SI(AO): 149.6 ML/M^2
BH CV ECHO MEAS - SI(CUBED): 6.8 ML/M^2
BH CV ECHO MEAS - SI(MOD-SP4): 21.2 ML/M^2
BH CV ECHO MEAS - SI(TEICH): 4.5 ML/M^2
BH CV ECHO MEAS - SV(AO): 310.3 ML
BH CV ECHO MEAS - SV(CUBED): 14 ML
BH CV ECHO MEAS - SV(MOD-SP4): 44 ML
BH CV ECHO MEAS - SV(TEICH): 9.3 ML
BH CV ECHO MEAS - TR MAX VEL: 260.6 CM/SEC
BH CV NUCLEAR PRIOR STUDY: 3
BH CV STRESS BP STAGE 1: NORMAL
BH CV STRESS BP STAGE 2: NORMAL
BH CV STRESS COMMENTS STAGE 1: NORMAL
BH CV STRESS COMMENTS STAGE 2: NORMAL
BH CV STRESS DOSE REGADENOSON STAGE 1: 0.4
BH CV STRESS DURATION MIN STAGE 1: 0
BH CV STRESS DURATION MIN STAGE 2: 4
BH CV STRESS DURATION SEC STAGE 1: 15
BH CV STRESS DURATION SEC STAGE 2: 0
BH CV STRESS HR STAGE 1: 111
BH CV STRESS HR STAGE 2: 81
BH CV STRESS PROTOCOL 1: NORMAL
BH CV STRESS RECOVERY BP: NORMAL MMHG
BH CV STRESS RECOVERY HR: 81 BPM
BH CV STRESS STAGE 1: 1
BH CV STRESS STAGE 2: 2
BUN BLD-MCNC: 21 MG/DL (ref 7–21)
BUN/CREAT SERPL: 20 (ref 7–25)
CALCIUM SPEC-SCNC: 9.3 MG/DL (ref 7.7–10)
CHLORIDE SERPL-SCNC: 104 MMOL/L (ref 99–112)
CO2 SERPL-SCNC: 23.7 MMOL/L (ref 24.3–31.9)
CREAT BLD-MCNC: 1.05 MG/DL (ref 0.43–1.29)
DEPRECATED RDW RBC AUTO: 45.3 FL (ref 37–54)
EOSINOPHIL # BLD AUTO: 0 10*3/MM3 (ref 0–0.7)
EOSINOPHIL NFR BLD AUTO: 0 % (ref 0–7)
ERYTHROCYTE [DISTWIDTH] IN BLOOD BY AUTOMATED COUNT: 13.3 % (ref 11.5–14.5)
GFR SERPL CREATININE-BSD FRML MDRD: 70 ML/MIN/1.73
GLUCOSE BLD-MCNC: 122 MG/DL (ref 70–110)
HCT VFR BLD AUTO: 43.5 % (ref 42–52)
HGB BLD-MCNC: 14.4 G/DL (ref 14–18)
IMM GRANULOCYTES # BLD: 0.01 10*3/MM3 (ref 0–0.03)
IMM GRANULOCYTES NFR BLD: 0.1 % (ref 0–0.5)
LYMPHOCYTES # BLD AUTO: 0.9 10*3/MM3 (ref 1–3)
LYMPHOCYTES NFR BLD AUTO: 9.2 % (ref 16–46)
MAXIMAL PREDICTED HEART RATE: 150 BPM
MAXIMAL PREDICTED HEART RATE: 150 BPM
MCH RBC QN AUTO: 32.2 PG (ref 27–33)
MCHC RBC AUTO-ENTMCNC: 33.1 G/DL (ref 33–37)
MCV RBC AUTO: 97.3 FL (ref 80–94)
MONOCYTES # BLD AUTO: 0.67 10*3/MM3 (ref 0.1–0.9)
MONOCYTES NFR BLD AUTO: 6.8 % (ref 0–12)
NEUTROPHILS # BLD AUTO: 8.21 10*3/MM3 (ref 1.4–6.5)
NEUTROPHILS NFR BLD AUTO: 83.8 % (ref 40–75)
OSMOLALITY SERPL CALC.SUM OF ELEC: 276.2 MOSM/KG (ref 273–305)
PERCENT MAX PREDICTED HR: 74 %
PLATELET # BLD AUTO: 144 10*3/MM3 (ref 130–400)
PMV BLD AUTO: 10.2 FL (ref 6–10)
POTASSIUM BLD-SCNC: 4.4 MMOL/L (ref 3.5–5.3)
RBC # BLD AUTO: 4.47 10*6/MM3 (ref 4.7–6.1)
SODIUM BLD-SCNC: 136 MMOL/L (ref 135–153)
STRESS BASELINE BP: NORMAL MMHG
STRESS BASELINE HR: 69 BPM
STRESS PERCENT HR: 87 %
STRESS POST PEAK BP: NORMAL MMHG
STRESS POST PEAK HR: 111 BPM
STRESS TARGET HR: 128 BPM
STRESS TARGET HR: 128 BPM
TROPONIN I SERPL-MCNC: 0.17 NG/ML
TROPONIN I SERPL-MCNC: 0.22 NG/ML
TROPONIN I SERPL-MCNC: 0.33 NG/ML
TROPONIN I SERPL-MCNC: 0.39 NG/ML
WBC NRBC COR # BLD: 9.8 10*3/MM3 (ref 4.5–12.5)

## 2017-12-14 PROCEDURE — 0 TECHNETIUM SESTAMIBI: Performed by: FAMILY MEDICINE

## 2017-12-14 PROCEDURE — 80048 BASIC METABOLIC PNL TOTAL CA: CPT | Performed by: FAMILY MEDICINE

## 2017-12-14 PROCEDURE — 85025 COMPLETE CBC W/AUTO DIFF WBC: CPT | Performed by: FAMILY MEDICINE

## 2017-12-14 PROCEDURE — 84484 ASSAY OF TROPONIN QUANT: CPT | Performed by: FAMILY MEDICINE

## 2017-12-14 PROCEDURE — 25010000002 METHYLPREDNISOLONE PER 125 MG: Performed by: FAMILY MEDICINE

## 2017-12-14 PROCEDURE — 94799 UNLISTED PULMONARY SVC/PX: CPT

## 2017-12-14 PROCEDURE — A9500 TC99M SESTAMIBI: HCPCS | Performed by: FAMILY MEDICINE

## 2017-12-14 PROCEDURE — 25810000003 SODIUM CHLORIDE 0.9 % WITH KCL 20 MEQ 20-0.9 MEQ/L-% SOLUTION: Performed by: FAMILY MEDICINE

## 2017-12-14 PROCEDURE — 93017 CV STRESS TEST TRACING ONLY: CPT

## 2017-12-14 PROCEDURE — 93306 TTE W/DOPPLER COMPLETE: CPT

## 2017-12-14 PROCEDURE — 25010000002 REGADENOSON 0.4 MG/5ML SOLUTION: Performed by: FAMILY MEDICINE

## 2017-12-14 PROCEDURE — 78452 HT MUSCLE IMAGE SPECT MULT: CPT

## 2017-12-14 RX ADMIN — IPRATROPIUM BROMIDE AND ALBUTEROL SULFATE 3 ML: .5; 3 SOLUTION RESPIRATORY (INHALATION) at 06:40

## 2017-12-14 RX ADMIN — TECHNETIUM TC-99M SESTAMIBI 1 DOSE: 1 INJECTION INTRAVENOUS at 11:15

## 2017-12-14 RX ADMIN — REGADENOSON 0.4 MG: 0.08 INJECTION, SOLUTION INTRAVENOUS at 13:15

## 2017-12-14 RX ADMIN — IPRATROPIUM BROMIDE AND ALBUTEROL SULFATE 3 ML: .5; 3 SOLUTION RESPIRATORY (INHALATION) at 00:51

## 2017-12-14 RX ADMIN — METHYLPREDNISOLONE SODIUM SUCCINATE 60 MG: 125 INJECTION, POWDER, FOR SOLUTION INTRAMUSCULAR; INTRAVENOUS at 05:13

## 2017-12-14 RX ADMIN — TECHNETIUM TC-99M SESTAMIBI 1 DOSE: 1 INJECTION INTRAVENOUS at 13:15

## 2017-12-14 RX ADMIN — METHYLPREDNISOLONE SODIUM SUCCINATE 60 MG: 125 INJECTION, POWDER, FOR SOLUTION INTRAMUSCULAR; INTRAVENOUS at 18:00

## 2017-12-14 RX ADMIN — METHYLPREDNISOLONE SODIUM SUCCINATE 60 MG: 125 INJECTION, POWDER, FOR SOLUTION INTRAMUSCULAR; INTRAVENOUS at 00:19

## 2017-12-14 RX ADMIN — IPRATROPIUM BROMIDE AND ALBUTEROL SULFATE 3 ML: .5; 3 SOLUTION RESPIRATORY (INHALATION) at 19:10

## 2017-12-14 RX ADMIN — SODIUM CHLORIDE AND POTASSIUM CHLORIDE 100 ML/HR: 9; 1.49 INJECTION, SOLUTION INTRAVENOUS at 06:59

## 2017-12-14 NOTE — PROGRESS NOTES
[place]     LOS: 1 day   Patient Care Team:  Federico Macias MD as PCP - General  Federico Macias MD as PCP - Family Medicine  Federico Macias MD as PCP - Claims Attributed      Subjective     Interval History:     Patient Complaints: Mild shortness of breath and wheezing  Patient Denies:  Chest pain  History taken from: patient chart    Review of Systems:    All systems were reviewed and negative except for:    Objective     Vital Signs  Temp:  [97.6 °F (36.4 °C)-98.2 °F (36.8 °C)] 98 °F (36.7 °C)  Heart Rate:  [60-88] 71  Resp:  [20-22] 20  BP: (142-166)/(71-95) 150/83  Lab Results (last 24 hours)     Procedure Component Value Units Date/Time    Troponin [[id number]]  (Abnormal) Collected:  12/13/17 1201    Specimen:  Blood Updated:  12/13/17 1300     Troponin I 0.519 (H) ng/mL     Narrative:       Ultra Troponin I Reference Range:         <=0.039 ng/mL: Negative    0.04-0.779 ng/mL: Indeterminate Range. Suspicious of MI.  Clinical correlation required.       >=0.78  ng/mL: Consistent with myocardial injury.  Clinical correlation required.    Troponin [[id number]]  (Abnormal) Collected:  12/13/17 1655    Specimen:  Blood Updated:  12/13/17 1756     Troponin I 0.530 (H) ng/mL     Narrative:       Ultra Troponin I Reference Range:         <=0.039 ng/mL: Negative    0.04-0.779 ng/mL: Indeterminate Range. Suspicious of MI.  Clinical correlation required.       >=0.78  ng/mL: Consistent with myocardial injury.  Clinical correlation required.    CBC Auto Differential [[id number]]  (Abnormal) Collected:  12/14/17 0029    Specimen:  Blood Updated:  12/14/17 0116     WBC 9.80 10*3/mm3      RBC 4.47 (L) 10*6/mm3      Hemoglobin 14.4 g/dL      Hematocrit 43.5 %      MCV 97.3 (H) fL      MCH 32.2 pg      MCHC 33.1 g/dL      RDW 13.3 %      RDW-SD 45.3 fl      MPV 10.2 (H) fL      Platelets 144 10*3/mm3      Neutrophil % 83.8 (H) %      Lymphocyte % 9.2 (L) %      Monocyte % 6.8 %      Eosinophil % 0.0 %       Basophil % 0.1 %      Immature Grans % 0.1 %      Neutrophils, Absolute 8.21 (H) 10*3/mm3      Lymphocytes, Absolute 0.90 (L) 10*3/mm3      Monocytes, Absolute 0.67 10*3/mm3      Eosinophils, Absolute 0.00 10*3/mm3      Basophils, Absolute 0.01 10*3/mm3      Immature Grans, Absolute 0.01 10*3/mm3     Basic Metabolic Panel [[id number]]  (Abnormal) Collected:  12/14/17 0029    Specimen:  Blood Updated:  12/14/17 0123     Glucose 122 (H) mg/dL      BUN 21 mg/dL      Creatinine 1.05 mg/dL      Sodium 136 mmol/L      Potassium 4.4 mmol/L      Chloride 104 mmol/L      CO2 23.7 (L) mmol/L      Calcium 9.3 mg/dL      eGFR Non African Amer 70 mL/min/1.73      BUN/Creatinine Ratio 20.0     Anion Gap 8.3 mmol/L     Narrative:       GFR Normal >60  Chronic Kidney Disease <60  Kidney Failure <15    Osmolality, Calculated [[id number]]  (Normal) Collected:  12/14/17 0029    Specimen:  Blood Updated:  12/14/17 0123     Osmolality Calc 276.2 mOsm/kg     Troponin [[id number]]  (Abnormal) Collected:  12/14/17 0029    Specimen:  Blood Updated:  12/14/17 0135     Troponin I 0.387 (H) ng/mL     Narrative:       Ultra Troponin I Reference Range:         <=0.039 ng/mL: Negative    0.04-0.779 ng/mL: Indeterminate Range. Suspicious of MI.  Clinical correlation required.       >=0.78  ng/mL: Consistent with myocardial injury.  Clinical correlation required.    Blood Culture - Blood, [[id number]]  (Normal) Collected:  12/13/17 0313    Specimen:  Blood from Arm, Left Updated:  12/14/17 0331     Blood Culture No growth at 24 hours    Blood Culture - Blood, [[id number]]  (Normal) Collected:  12/13/17 0348    Specimen:  Blood from Arm, Left Updated:  12/14/17 0401     Blood Culture No growth at 24 hours    Troponin [[id number]]  (Abnormal) Collected:  12/14/17 0448    Specimen:  Blood Updated:  12/14/17 0548     Troponin I 0.330 (H) ng/mL     Narrative:       Ultra Troponin I Reference Range:         <=0.039 ng/mL: Negative    0.04-0.779 ng/mL:  Indeterminate Range. Suspicious of MI.  Clinical correlation required.       >=0.78  ng/mL: Consistent with myocardial injury.  Clinical correlation required.            Physical Exam:     General Appearance:    Alert, cooperative, in no acute distress   Head:    Normocephalic, without obvious abnormality, atraumatic   Eyes:            Lids and lashes normal, conjunctivae and sclerae normal, no   icterus, no pallor, corneas clear, PERRLA   Ears:    Ears appear intact with no abnormalities noted   Throat:   No oral lesions, no thrush, oral mucosa moist   Neck:   No adenopathy, supple, trachea midline, no thyromegaly, no     carotid bruit, no JVD   Back:     No kyphosis present, no scoliosis present, no skin lesions,       erythema or scars, no tenderness to percussion or                   palpation,   range of motion normal   Lungs:    Bilateral rhonchi, mild expiratory wheezes, no rales auscultated     Heart:    Regular rhythm and normal rate, normal S1 and S2, no            murmur, no gallop, no rub, no click   Breast Exam:    Deferred   Abdomen:     Normal bowel sounds, no masses, no organomegaly, soft        non-tender, non-distended, no guarding, no rebound                 tenderness   Genitalia:    Deferred   Extremities:   Moves all extremities well, no edema, no cyanosis, no              redness   Pulses:   Pulses palpable and equal bilaterally   Skin:   No bleeding, bruising or rash   Lymph nodes:   No palpable adenopathy   Neurologic:   Cranial nerves 2 - 12 grossly intact, sensation intact, DTR        present and equal bilaterally     Lab Results (last 24 hours)     Procedure Component Value Units Date/Time    Troponin [[id number]]  (Abnormal) Collected:  12/13/17 1201    Specimen:  Blood Updated:  12/13/17 1300     Troponin I 0.519 (H) ng/mL     Narrative:       Ultra Troponin I Reference Range:         <=0.039 ng/mL: Negative    0.04-0.779 ng/mL: Indeterminate Range. Suspicious of MI.  Clinical  correlation required.       >=0.78  ng/mL: Consistent with myocardial injury.  Clinical correlation required.    Troponin [[id number]]  (Abnormal) Collected:  12/13/17 1655    Specimen:  Blood Updated:  12/13/17 1756     Troponin I 0.530 (H) ng/mL     Narrative:       Ultra Troponin I Reference Range:         <=0.039 ng/mL: Negative    0.04-0.779 ng/mL: Indeterminate Range. Suspicious of MI.  Clinical correlation required.       >=0.78  ng/mL: Consistent with myocardial injury.  Clinical correlation required.    CBC Auto Differential [[id number]]  (Abnormal) Collected:  12/14/17 0029    Specimen:  Blood Updated:  12/14/17 0116     WBC 9.80 10*3/mm3      RBC 4.47 (L) 10*6/mm3      Hemoglobin 14.4 g/dL      Hematocrit 43.5 %      MCV 97.3 (H) fL      MCH 32.2 pg      MCHC 33.1 g/dL      RDW 13.3 %      RDW-SD 45.3 fl      MPV 10.2 (H) fL      Platelets 144 10*3/mm3      Neutrophil % 83.8 (H) %      Lymphocyte % 9.2 (L) %      Monocyte % 6.8 %      Eosinophil % 0.0 %      Basophil % 0.1 %      Immature Grans % 0.1 %      Neutrophils, Absolute 8.21 (H) 10*3/mm3      Lymphocytes, Absolute 0.90 (L) 10*3/mm3      Monocytes, Absolute 0.67 10*3/mm3      Eosinophils, Absolute 0.00 10*3/mm3      Basophils, Absolute 0.01 10*3/mm3      Immature Grans, Absolute 0.01 10*3/mm3     Basic Metabolic Panel [[id number]]  (Abnormal) Collected:  12/14/17 0029    Specimen:  Blood Updated:  12/14/17 0123     Glucose 122 (H) mg/dL      BUN 21 mg/dL      Creatinine 1.05 mg/dL      Sodium 136 mmol/L      Potassium 4.4 mmol/L      Chloride 104 mmol/L      CO2 23.7 (L) mmol/L      Calcium 9.3 mg/dL      eGFR Non African Amer 70 mL/min/1.73      BUN/Creatinine Ratio 20.0     Anion Gap 8.3 mmol/L     Narrative:       GFR Normal >60  Chronic Kidney Disease <60  Kidney Failure <15    Osmolality, Calculated [[id number]]  (Normal) Collected:  12/14/17 0029    Specimen:  Blood Updated:  12/14/17 0123     Osmolality Calc 276.2 mOsm/kg     Troponin  [[id number]]  (Abnormal) Collected:  12/14/17 0029    Specimen:  Blood Updated:  12/14/17 0135     Troponin I 0.387 (H) ng/mL     Narrative:       Ultra Troponin I Reference Range:         <=0.039 ng/mL: Negative    0.04-0.779 ng/mL: Indeterminate Range. Suspicious of MI.  Clinical correlation required.       >=0.78  ng/mL: Consistent with myocardial injury.  Clinical correlation required.    Blood Culture - Blood, [[id number]]  (Normal) Collected:  12/13/17 0313    Specimen:  Blood from Arm, Left Updated:  12/14/17 0331     Blood Culture No growth at 24 hours    Blood Culture - Blood, [[id number]]  (Normal) Collected:  12/13/17 0348    Specimen:  Blood from Arm, Left Updated:  12/14/17 0401     Blood Culture No growth at 24 hours    Troponin [[id number]]  (Abnormal) Collected:  12/14/17 0448    Specimen:  Blood Updated:  12/14/17 0548     Troponin I 0.330 (H) ng/mL     Narrative:       Ultra Troponin I Reference Range:         <=0.039 ng/mL: Negative    0.04-0.779 ng/mL: Indeterminate Range. Suspicious of MI.  Clinical correlation required.       >=0.78  ng/mL: Consistent with myocardial injury.  Clinical correlation required.        Imaging Results (last 24 hours)     Procedure Component Value Units Date/Time    XR Chest 2 View [[id number]] Collected:  12/13/17 0724     Updated:  12/13/17 0727    Narrative:       EXAMINATION: XR CHEST 2 VW-      CLINICAL INDICATION: SOB          COMPARISON: 8/18/2015      TECHNIQUE: XR CHEST 2 VW-      FINDINGS:   Vague increased density in the right upper lobe. I cannot exclude an  underlying nodule   Heart and mediastinal contours are unremarkable.   No pneumothorax.   Bony and soft tissue structures are unremarkable.            Impression:       Cannot exclude right upper lobe nodule or airspace disease     This report was finalized on 12/13/2017 7:25 AM by Dr. Jean-Claude Bermeo MD.           Hospital Medications (active)       Dose Frequency Start End    albuterol (PROVENTIL) nebulizer  "solution 0.083% 2.5 mg/3mL 2.5 mg Every 6 Hours PRN 12/13/2017     Sig - Route: Take 2.5 mg by nebulization Every 6 (Six) Hours As Needed for Wheezing. - Nebulization    Notes to Pharmacy: Prior to [facility] [place], operated by [facility] Admission, Patient was on: 2 puffs every 4 to 6 hours as needed    aspirin EC tablet 81 mg 81 mg Daily 12/14/2017     Sig - Route: Take 1 tablet by mouth Daily. - Oral    enoxaparin (LOVENOX) syringe 40 mg 40 mg Every 24 Hours 12/13/2017     Sig - Route: Inject 0.4 mL under the skin Daily. - Subcutaneous    furosemide (LASIX) tablet 20 mg 20 mg Daily 12/13/2017     Sig - Route: Take 1 tablet by mouth Daily. - Oral    ipratropium-albuterol (DUO-NEB) nebulizer solution 3 mL 3 mL 4 Times Daily - RT 12/13/2017     Sig - Route: Take 3 mL by nebulization 4 (Four) Times a Day. - Nebulization    lisinopril (PRINIVIL,ZESTRIL) tablet 20 mg 20 mg Daily 12/14/2017     Sig - Route: Take 2 tablets by mouth Daily. - Oral    methylPREDNISolone sodium succinate (SOLU-Medrol) injection 60 mg 60 mg Every 6 Hours Scheduled 12/13/2017     Sig - Route: Infuse 0.96 mL into a venous catheter Every 6 (Six) Hours. - Intravenous    metoprolol succinate XL (TOPROL-XL) 24 hr tablet 50 mg 50 mg Daily 12/13/2017     Sig - Route: Take 1 tablet by mouth Daily. - Oral    pneumococcal polysaccharide 23-valent (PNEUMOVAX-23) vaccine 0.5 mL 0.5 mL Once 12/13/2017 12/13/2017    Sig - Route: Inject 0.5 mL into the shoulder, thigh, or buttocks 1 (One) Time. - Intramuscular    sodium chloride 0.9 % flush 1-10 mL 1-10 mL As Needed 12/13/2017     Sig - Route: Infuse 1-10 mL into a venous catheter As Needed for Line Care. - Intravenous    sodium chloride 0.9 % flush 10 mL 10 mL As Needed 12/13/2017     Sig - Route: Infuse 10 mL into a venous catheter As Needed for Line Care. - Intravenous    Cosign for Ordering: Accepted by Kulwinder Cho MD on 12/13/2017  4:44 AM    Linked Group 1:  \"And\" Linked Group Details        sodium chloride 0.9 % with KCl 20 " mEq/L infusion 100 mL/hr Continuous 12/13/2017     Sig - Route: Infuse 100 mL/hr into a venous catheter Continuous. - Intravenous    spironolactone (ALDACTONE) tablet 25 mg 25 mg Daily 12/13/2017     Sig - Route: Take 1 tablet by mouth Daily. - Oral    aspirin tablet 325 mg (Discontinued) 325 mg Daily 12/13/2017 12/13/2017    Sig - Route: Take 1 tablet by mouth Daily. - Oral    lisinopril (PRINIVIL,ZESTRIL) tablet 10 mg (Discontinued) 10 mg Daily 12/13/2017 12/13/2017    Sig - Route: Take 1 tablet by mouth Daily. - Oral         Results Review:     I reviewed the patient's new clinical results.    Medications Reviewed    Assessment/Plan   1.  Elevated cardiac enzymes in the gray zone   Appreciate cardiology input.  Enzymes have remained stable in the gray zone.  He is scheduled for Lexiscan stress test today  2.  Acute on chronic exacerbation of COPD   Continue duo nebs and Solu-Medrol  3.  Hypertension  4.  History of CVA  5.  Coronary artery disease    Active Problems:    Elevated troponin              Nelson Ash MD  12/14/17  6:29 AM

## 2017-12-14 NOTE — PLAN OF CARE
Problem: Patient Care Overview (Adult)  Goal: Plan of Care Review  Outcome: Ongoing (interventions implemented as appropriate)  Goal: Adult Individualization and Mutuality  Outcome: Ongoing (interventions implemented as appropriate)  Goal: Discharge Needs Assessment  Outcome: Ongoing (interventions implemented as appropriate)    Problem: Pain, Acute (Adult)  Goal: Acceptable Pain Control/Comfort Level  Outcome: Ongoing (interventions implemented as appropriate)    Problem: Cardiac Output, Decreased (Adult)  Goal: Adequate Cardiac Output/Effective Tissue Perfusion  Outcome: Ongoing (interventions implemented as appropriate)

## 2017-12-14 NOTE — PLAN OF CARE
Problem: Patient Care Overview (Adult)  Goal: Plan of Care Review  Outcome: Ongoing (interventions implemented as appropriate)    Problem: Pain, Acute (Adult)  Goal: Acceptable Pain Control/Comfort Level  Outcome: Ongoing (interventions implemented as appropriate)    Problem: Cardiac Output, Decreased (Adult)  Goal: Adequate Cardiac Output/Effective Tissue Perfusion  Outcome: Ongoing (interventions implemented as appropriate)

## 2017-12-15 ENCOUNTER — APPOINTMENT (OUTPATIENT)
Dept: GENERAL RADIOLOGY | Facility: HOSPITAL | Age: 72
End: 2017-12-15

## 2017-12-15 LAB
A-A DO2: 201.9 MMHG (ref 0–300)
A-A DO2: 69 MMHG (ref 0–300)
A-A DO2: 97.7 MMHG (ref 0–300)
ANION GAP SERPL CALCULATED.3IONS-SCNC: 7.2 MMOL/L (ref 3.6–11.2)
ARTERIAL PATENCY WRIST A: POSITIVE
ATMOSPHERIC PRESS: 718 MMHG
ATMOSPHERIC PRESS: 722 MMHG
ATMOSPHERIC PRESS: 722 MMHG
BASE EXCESS BLDA CALC-SCNC: -3.1 MMOL/L
BASE EXCESS BLDA CALC-SCNC: -3.6 MMOL/L
BASE EXCESS BLDA CALC-SCNC: 0.1 MMOL/L
BASOPHILS # BLD AUTO: 0.01 10*3/MM3 (ref 0–0.3)
BASOPHILS NFR BLD AUTO: 0.1 % (ref 0–2)
BDY SITE: ABNORMAL
BODY TEMPERATURE: 98.6 C
BODY TEMPERATURE: 98.6 C
BODY TEMPERATURE: 98.9 C
BUN BLD-MCNC: 23 MG/DL (ref 7–21)
BUN/CREAT SERPL: 23.5 (ref 7–25)
CALCIUM SPEC-SCNC: 9.4 MG/DL (ref 7.7–10)
CHLORIDE SERPL-SCNC: 107 MMOL/L (ref 99–112)
CK MB SERPL-CCNC: 7.96 NG/ML (ref 0–5)
CK MB SERPL-RTO: 5.9 % (ref 0–3)
CK SERPL-CCNC: 135 U/L (ref 24–204)
CO2 SERPL-SCNC: 22.8 MMOL/L (ref 24.3–31.9)
COHGB MFR BLD: 0.6 % (ref 0–5)
COHGB MFR BLD: 0.9 % (ref 0–5)
COHGB MFR BLD: 1 % (ref 0–5)
CREAT BLD-MCNC: 0.98 MG/DL (ref 0.43–1.29)
CRP SERPL-MCNC: <0.5 MG/DL (ref 0–0.99)
DEPRECATED RDW RBC AUTO: 46.9 FL (ref 37–54)
EOSINOPHIL # BLD AUTO: 0.2 10*3/MM3 (ref 0–0.7)
EOSINOPHIL NFR BLD AUTO: 1.7 % (ref 0–7)
EPAP: 6
ERYTHROCYTE [DISTWIDTH] IN BLOOD BY AUTOMATED COUNT: 13.4 % (ref 11.5–14.5)
GFR SERPL CREATININE-BSD FRML MDRD: 76 ML/MIN/1.73
GLUCOSE BLD-MCNC: 108 MG/DL (ref 70–110)
GLUCOSE BLDC GLUCOMTR-MCNC: 138 MG/DL (ref 70–130)
HCO3 BLDA-SCNC: 23.7 MMOL/L (ref 22–26)
HCO3 BLDA-SCNC: 26.2 MMOL/L (ref 22–26)
HCO3 BLDA-SCNC: 27.3 MMOL/L (ref 22–26)
HCT VFR BLD AUTO: 45.2 % (ref 42–52)
HCT VFR BLD CALC: 46 % (ref 42–52)
HCT VFR BLD CALC: 49 % (ref 42–52)
HCT VFR BLD CALC: 51 % (ref 42–52)
HGB BLD-MCNC: 14.6 G/DL (ref 14–18)
HGB BLDA-MCNC: 15.7 G/DL (ref 12–16)
HGB BLDA-MCNC: 16.6 G/DL (ref 12–16)
HGB BLDA-MCNC: 17.3 G/DL (ref 12–16)
HOROWITZ INDEX BLD+IHG-RTO: 32 %
HOROWITZ INDEX BLD+IHG-RTO: 50 %
HOROWITZ INDEX BLD+IHG-RTO: 50 %
IMM GRANULOCYTES # BLD: 0.04 10*3/MM3 (ref 0–0.03)
IMM GRANULOCYTES NFR BLD: 0.3 % (ref 0–0.5)
IPAP: 12
LYMPHOCYTES # BLD AUTO: 0.86 10*3/MM3 (ref 1–3)
LYMPHOCYTES NFR BLD AUTO: 7.1 % (ref 16–46)
MCH RBC QN AUTO: 31.9 PG (ref 27–33)
MCHC RBC AUTO-ENTMCNC: 32.3 G/DL (ref 33–37)
MCV RBC AUTO: 98.7 FL (ref 80–94)
METHGB BLD QL: 0.2 % (ref 0–3)
METHGB BLD QL: 0.3 % (ref 0–3)
METHGB BLD QL: 0.4 % (ref 0–3)
MODALITY: ABNORMAL
MONOCYTES # BLD AUTO: 0.83 10*3/MM3 (ref 0.1–0.9)
MONOCYTES NFR BLD AUTO: 6.9 % (ref 0–12)
MYOGLOBIN SERPL-MCNC: 88 NG/ML (ref 0–109)
NEUTROPHILS # BLD AUTO: 10.16 10*3/MM3 (ref 1.4–6.5)
NEUTROPHILS NFR BLD AUTO: 83.9 % (ref 40–75)
OSMOLALITY SERPL CALC.SUM OF ELEC: 278 MOSM/KG (ref 273–305)
OXYHGB MFR BLDV: 93 % (ref 85–100)
OXYHGB MFR BLDV: 94.6 % (ref 85–100)
OXYHGB MFR BLDV: 98 % (ref 85–100)
PCO2 BLDA: 50.8 MM HG (ref 35–45)
PCO2 BLDA: 54.2 MM HG (ref 35–45)
PCO2 BLDA: 64.4 MM HG (ref 35–45)
PEEP RESPIRATORY: 5 CM[H2O]
PH BLDA: 7.23 PH UNITS (ref 7.35–7.45)
PH BLDA: 7.29 PH UNITS (ref 7.35–7.45)
PH BLDA: 7.32 PH UNITS (ref 7.35–7.45)
PLATELET # BLD AUTO: 134 10*3/MM3 (ref 130–400)
PMV BLD AUTO: 11.4 FL (ref 6–10)
PO2 BLDA: 167.4 MM HG (ref 80–100)
PO2 BLDA: 72.8 MM HG (ref 80–100)
PO2 BLDA: 86.3 MM HG (ref 80–100)
POTASSIUM BLD-SCNC: 5.5 MMOL/L (ref 3.5–5.3)
RBC # BLD AUTO: 4.58 10*6/MM3 (ref 4.7–6.1)
SAO2 % BLDCOA: 94.2 % (ref 90–100)
SAO2 % BLDCOA: 95.7 % (ref 90–100)
SAO2 % BLDCOA: 99 % (ref 90–100)
SET MECH RESP RATE: 10
SET MECH RESP RATE: 22
SODIUM BLD-SCNC: 137 MMOL/L (ref 135–153)
TROPONIN I SERPL-MCNC: 0.14 NG/ML
TROPONIN I SERPL-MCNC: 0.18 NG/ML
TROPONIN I SERPL-MCNC: 0.18 NG/ML
TROPONIN I SERPL-MCNC: 0.57 NG/ML
TROPONIN I SERPL-MCNC: 0.59 NG/ML
VENTILATOR MODE: AC
VT ON VENT VENT: 450 ML
WBC NRBC COR # BLD: 12.1 10*3/MM3 (ref 4.5–12.5)

## 2017-12-15 PROCEDURE — 83050 HGB METHEMOGLOBIN QUAN: CPT | Performed by: FAMILY MEDICINE

## 2017-12-15 PROCEDURE — 82375 ASSAY CARBOXYHB QUANT: CPT | Performed by: INTERNAL MEDICINE

## 2017-12-15 PROCEDURE — 82375 ASSAY CARBOXYHB QUANT: CPT | Performed by: NURSE PRACTITIONER

## 2017-12-15 PROCEDURE — 94799 UNLISTED PULMONARY SVC/PX: CPT

## 2017-12-15 PROCEDURE — 85025 COMPLETE CBC W/AUTO DIFF WBC: CPT | Performed by: FAMILY MEDICINE

## 2017-12-15 PROCEDURE — 99291 CRITICAL CARE FIRST HOUR: CPT | Performed by: INTERNAL MEDICINE

## 2017-12-15 PROCEDURE — 93005 ELECTROCARDIOGRAM TRACING: CPT | Performed by: INTERNAL MEDICINE

## 2017-12-15 PROCEDURE — 83874 ASSAY OF MYOGLOBIN: CPT | Performed by: FAMILY MEDICINE

## 2017-12-15 PROCEDURE — 82962 GLUCOSE BLOOD TEST: CPT

## 2017-12-15 PROCEDURE — 93005 ELECTROCARDIOGRAM TRACING: CPT | Performed by: FAMILY MEDICINE

## 2017-12-15 PROCEDURE — C1751 CATH, INF, PER/CENT/MIDLINE: HCPCS

## 2017-12-15 PROCEDURE — 25010000002 PROPOFOL 1000 MG/ML EMULSION: Performed by: INTERNAL MEDICINE

## 2017-12-15 PROCEDURE — 25010000002 PROPOFOL 10 MG/ML EMULSION

## 2017-12-15 PROCEDURE — 25010000002 DOPAMINE PER 40 MG: Performed by: INTERNAL MEDICINE

## 2017-12-15 PROCEDURE — 82553 CREATINE MB FRACTION: CPT | Performed by: FAMILY MEDICINE

## 2017-12-15 PROCEDURE — 82550 ASSAY OF CK (CPK): CPT | Performed by: FAMILY MEDICINE

## 2017-12-15 PROCEDURE — 5A1955Z RESPIRATORY VENTILATION, GREATER THAN 96 CONSECUTIVE HOURS: ICD-10-PCS | Performed by: INTERNAL MEDICINE

## 2017-12-15 PROCEDURE — 25010000002 METHYLPREDNISOLONE PER 125 MG: Performed by: FAMILY MEDICINE

## 2017-12-15 PROCEDURE — 25010000002 ENOXAPARIN PER 10 MG: Performed by: FAMILY MEDICINE

## 2017-12-15 PROCEDURE — 25010000002 METHYLPREDNISOLONE PER 40 MG: Performed by: NURSE PRACTITIONER

## 2017-12-15 PROCEDURE — 94002 VENT MGMT INPAT INIT DAY: CPT

## 2017-12-15 PROCEDURE — 25810000003 SODIUM CHLORIDE 0.9 % WITH KCL 20 MEQ 20-0.9 MEQ/L-% SOLUTION: Performed by: FAMILY MEDICINE

## 2017-12-15 PROCEDURE — 82375 ASSAY CARBOXYHB QUANT: CPT | Performed by: FAMILY MEDICINE

## 2017-12-15 PROCEDURE — 83050 HGB METHEMOGLOBIN QUAN: CPT | Performed by: INTERNAL MEDICINE

## 2017-12-15 PROCEDURE — 31500 INSERT EMERGENCY AIRWAY: CPT | Performed by: INTERNAL MEDICINE

## 2017-12-15 PROCEDURE — 86140 C-REACTIVE PROTEIN: CPT | Performed by: FAMILY MEDICINE

## 2017-12-15 PROCEDURE — 82805 BLOOD GASES W/O2 SATURATION: CPT | Performed by: FAMILY MEDICINE

## 2017-12-15 PROCEDURE — 71010 HC CHEST PA OR AP: CPT

## 2017-12-15 PROCEDURE — 36600 WITHDRAWAL OF ARTERIAL BLOOD: CPT | Performed by: NURSE PRACTITIONER

## 2017-12-15 PROCEDURE — 0BH17EZ INSERTION OF ENDOTRACHEAL AIRWAY INTO TRACHEA, VIA NATURAL OR ARTIFICIAL OPENING: ICD-10-PCS | Performed by: INTERNAL MEDICINE

## 2017-12-15 PROCEDURE — 71010 XR CHEST 1 VW: CPT | Performed by: RADIOLOGY

## 2017-12-15 PROCEDURE — 83050 HGB METHEMOGLOBIN QUAN: CPT | Performed by: NURSE PRACTITIONER

## 2017-12-15 PROCEDURE — 82805 BLOOD GASES W/O2 SATURATION: CPT | Performed by: INTERNAL MEDICINE

## 2017-12-15 PROCEDURE — 25010000002 FUROSEMIDE PER 20 MG: Performed by: FAMILY MEDICINE

## 2017-12-15 PROCEDURE — 25010000002 MIDAZOLAM PER 1 MG

## 2017-12-15 PROCEDURE — 82805 BLOOD GASES W/O2 SATURATION: CPT | Performed by: NURSE PRACTITIONER

## 2017-12-15 PROCEDURE — 36600 WITHDRAWAL OF ARTERIAL BLOOD: CPT | Performed by: FAMILY MEDICINE

## 2017-12-15 PROCEDURE — 36600 WITHDRAWAL OF ARTERIAL BLOOD: CPT | Performed by: INTERNAL MEDICINE

## 2017-12-15 PROCEDURE — 80048 BASIC METABOLIC PNL TOTAL CA: CPT | Performed by: FAMILY MEDICINE

## 2017-12-15 PROCEDURE — 94660 CPAP INITIATION&MGMT: CPT

## 2017-12-15 PROCEDURE — 84484 ASSAY OF TROPONIN QUANT: CPT | Performed by: FAMILY MEDICINE

## 2017-12-15 RX ORDER — CLONIDINE HYDROCHLORIDE 0.1 MG/1
0.1 TABLET ORAL ONCE
Status: COMPLETED | OUTPATIENT
Start: 2017-12-15 | End: 2017-12-15

## 2017-12-15 RX ORDER — IPRATROPIUM BROMIDE AND ALBUTEROL SULFATE 2.5; .5 MG/3ML; MG/3ML
3 SOLUTION RESPIRATORY (INHALATION) EVERY 4 HOURS PRN
Status: DISCONTINUED | OUTPATIENT
Start: 2017-12-15 | End: 2017-12-20 | Stop reason: HOSPADM

## 2017-12-15 RX ORDER — SODIUM CHLORIDE 0.9 % (FLUSH) 0.9 %
10 SYRINGE (ML) INJECTION EVERY 12 HOURS SCHEDULED
Status: DISCONTINUED | OUTPATIENT
Start: 2017-12-15 | End: 2017-12-20 | Stop reason: HOSPADM

## 2017-12-15 RX ORDER — VECURONIUM BROMIDE 1 MG/ML
10 INJECTION, POWDER, LYOPHILIZED, FOR SOLUTION INTRAVENOUS ONCE
Status: DISCONTINUED | OUTPATIENT
Start: 2017-12-15 | End: 2017-12-20 | Stop reason: HOSPADM

## 2017-12-15 RX ORDER — DOPAMINE HYDROCHLORIDE 160 MG/100ML
2-20 INJECTION, SOLUTION INTRAVENOUS
Status: DISCONTINUED | OUTPATIENT
Start: 2017-12-15 | End: 2017-12-20 | Stop reason: HOSPADM

## 2017-12-15 RX ORDER — FUROSEMIDE 10 MG/ML
20 INJECTION INTRAMUSCULAR; INTRAVENOUS ONCE
Status: COMPLETED | OUTPATIENT
Start: 2017-12-15 | End: 2017-12-15

## 2017-12-15 RX ORDER — METOPROLOL TARTRATE 5 MG/5ML
INJECTION INTRAVENOUS
Status: COMPLETED
Start: 2017-12-15 | End: 2017-12-15

## 2017-12-15 RX ORDER — IPRATROPIUM BROMIDE AND ALBUTEROL SULFATE 2.5; .5 MG/3ML; MG/3ML
3 SOLUTION RESPIRATORY (INHALATION)
Status: DISCONTINUED | OUTPATIENT
Start: 2017-12-15 | End: 2017-12-20 | Stop reason: HOSPADM

## 2017-12-15 RX ORDER — MIDAZOLAM HYDROCHLORIDE 1 MG/ML
INJECTION INTRAMUSCULAR; INTRAVENOUS
Status: COMPLETED
Start: 2017-12-15 | End: 2017-12-15

## 2017-12-15 RX ORDER — SODIUM CHLORIDE 0.9 % (FLUSH) 0.9 %
10 SYRINGE (ML) INJECTION AS NEEDED
Status: DISCONTINUED | OUTPATIENT
Start: 2017-12-15 | End: 2017-12-20 | Stop reason: HOSPADM

## 2017-12-15 RX ORDER — METHYLPREDNISOLONE SODIUM SUCCINATE 40 MG/ML
40 INJECTION, POWDER, LYOPHILIZED, FOR SOLUTION INTRAMUSCULAR; INTRAVENOUS EVERY 12 HOURS
Status: DISCONTINUED | OUTPATIENT
Start: 2017-12-15 | End: 2017-12-20 | Stop reason: HOSPADM

## 2017-12-15 RX ORDER — PROPOFOL 10 MG/ML
VIAL (ML) INTRAVENOUS
Status: COMPLETED
Start: 2017-12-15 | End: 2017-12-15

## 2017-12-15 RX ADMIN — PROPOFOL 30 MCG/KG/MIN: 10 INJECTION, EMULSION INTRAVENOUS at 08:44

## 2017-12-15 RX ADMIN — Medication: at 08:45

## 2017-12-15 RX ADMIN — FUROSEMIDE 20 MG: 10 INJECTION, SOLUTION INTRAMUSCULAR; INTRAVENOUS at 03:59

## 2017-12-15 RX ADMIN — SODIUM CHLORIDE AND POTASSIUM CHLORIDE 100 ML/HR: 9; 1.49 INJECTION, SOLUTION INTRAVENOUS at 02:14

## 2017-12-15 RX ADMIN — IPRATROPIUM BROMIDE AND ALBUTEROL SULFATE 3 ML: .5; 3 SOLUTION RESPIRATORY (INHALATION) at 19:05

## 2017-12-15 RX ADMIN — DOPAMINE HYDROCHLORIDE 2 MCG/KG/MIN: 160 INJECTION, SOLUTION INTRAVENOUS at 16:38

## 2017-12-15 RX ADMIN — SPIRONOLACTONE 25 MG: 25 TABLET ORAL at 11:07

## 2017-12-15 RX ADMIN — METHYLPREDNISOLONE SODIUM SUCCINATE 60 MG: 125 INJECTION, POWDER, FOR SOLUTION INTRAMUSCULAR; INTRAVENOUS at 00:24

## 2017-12-15 RX ADMIN — PROPOFOL 50 MCG/KG/MIN: 10 INJECTION, EMULSION INTRAVENOUS at 18:52

## 2017-12-15 RX ADMIN — ENOXAPARIN SODIUM 40 MG: 40 INJECTION SUBCUTANEOUS at 09:44

## 2017-12-15 RX ADMIN — PROPOFOL 50 MCG/KG/MIN: 10 INJECTION, EMULSION INTRAVENOUS at 12:25

## 2017-12-15 RX ADMIN — NITROGLYCERIN 1 INCH: 20 OINTMENT TOPICAL at 04:00

## 2017-12-15 RX ADMIN — IPRATROPIUM BROMIDE AND ALBUTEROL SULFATE 3 ML: .5; 3 SOLUTION RESPIRATORY (INHALATION) at 13:12

## 2017-12-15 RX ADMIN — DILTIAZEM HYDROCHLORIDE 5 MG/HR: 100 INJECTION, POWDER, LYOPHILIZED, FOR SOLUTION INTRAVENOUS at 04:00

## 2017-12-15 RX ADMIN — DILTIAZEM HYDROCHLORIDE 10 MG/HR: 100 INJECTION, POWDER, LYOPHILIZED, FOR SOLUTION INTRAVENOUS at 04:51

## 2017-12-15 RX ADMIN — DILTIAZEM HYDROCHLORIDE 5 MG/HR: 100 INJECTION, POWDER, LYOPHILIZED, FOR SOLUTION INTRAVENOUS at 03:40

## 2017-12-15 RX ADMIN — Medication 10 ML: at 20:33

## 2017-12-15 RX ADMIN — METHYLPREDNISOLONE SODIUM SUCCINATE 60 MG: 125 INJECTION, POWDER, FOR SOLUTION INTRAMUSCULAR; INTRAVENOUS at 04:50

## 2017-12-15 RX ADMIN — IPRATROPIUM BROMIDE AND ALBUTEROL SULFATE 3 ML: .5; 3 SOLUTION RESPIRATORY (INHALATION) at 00:32

## 2017-12-15 RX ADMIN — MIDAZOLAM HYDROCHLORIDE 4 MG: 1 INJECTION, SOLUTION INTRAMUSCULAR; INTRAVENOUS at 08:20

## 2017-12-15 RX ADMIN — CLONIDINE HYDROCHLORIDE 0.1 MG: 0.1 TABLET ORAL at 04:50

## 2017-12-15 RX ADMIN — IPRATROPIUM BROMIDE AND ALBUTEROL SULFATE 3 ML: .5; 3 SOLUTION RESPIRATORY (INHALATION) at 07:09

## 2017-12-15 RX ADMIN — ASPIRIN 81 MG: 81 TABLET ORAL at 11:04

## 2017-12-15 RX ADMIN — METHYLPREDNISOLONE SODIUM SUCCINATE 40 MG: 125 INJECTION, POWDER, FOR SOLUTION INTRAMUSCULAR; INTRAVENOUS at 18:50

## 2017-12-15 RX ADMIN — Medication: at 23:47

## 2017-12-15 RX ADMIN — METOROPROLOL TARTRATE 5 MG: 5 INJECTION, SOLUTION INTRAVENOUS at 06:05

## 2017-12-15 RX ADMIN — DILTIAZEM HYDROCHLORIDE 10 MG: 100 INJECTION, POWDER, LYOPHILIZED, FOR SOLUTION INTRAVENOUS at 03:23

## 2017-12-15 RX ADMIN — FUROSEMIDE 20 MG: 20 TABLET ORAL at 11:03

## 2017-12-15 RX ADMIN — PROPOFOL 50 MCG/KG/MIN: 10 INJECTION, EMULSION INTRAVENOUS at 23:46

## 2017-12-15 RX ADMIN — PROPOFOL 50 MCG/KG/MIN: 10 INJECTION, EMULSION INTRAVENOUS at 15:51

## 2017-12-15 NOTE — PLAN OF CARE
Problem: Patient Care Overview (Adult)  Goal: Plan of Care Review  Outcome: Ongoing (interventions implemented as appropriate)    12/14/17 0849 12/15/17 0600   Coping/Psychosocial Response Interventions   Plan Of Care Reviewed With --  patient;family   Patient Care Overview   Progress improving --        Goal: Adult Individualization and Mutuality  Outcome: Ongoing (interventions implemented as appropriate)  Goal: Discharge Needs Assessment  Outcome: Ongoing (interventions implemented as appropriate)    12/13/17 0641 12/13/17 1519 12/15/17 0648   Discharge Needs Assessment   Concerns To Be Addressed --  --  basic needs concerns   Living Environment   Transportation Available --  car --    Self-Care   Equipment Currently Used at Home none --  --          Problem: Pain, Acute (Adult)  Goal: Acceptable Pain Control/Comfort Level  Outcome: Ongoing (interventions implemented as appropriate)    12/15/17 0648   Pain, Acute (Adult)   Acceptable Pain Control/Comfort Level making progress toward outcome         Problem: Cardiac Output, Decreased (Adult)  Goal: Adequate Cardiac Output/Effective Tissue Perfusion  Outcome: Ongoing (interventions implemented as appropriate)    12/15/17 0648   Cardiac Output, Decreased (Adult)   Adequate Cardiac Output/Effective Tissue Perfusion making progress toward outcome         Problem: Skin Integrity Impairment, Risk/Actual (Adult)  Goal: Identify Related Risk Factors and Signs and Symptoms  Outcome: Ongoing (interventions implemented as appropriate)    12/15/17 0648   Skin Integrity Impairment, Risk/Actual   Skin Integrity Impairment, Risk/Actual: Related Risk Factors immobility;medication effects;moisture;tissue perfusion impaired       Goal: Skin Integrity/Wound Healing  Outcome: Ongoing (interventions implemented as appropriate)    12/15/17 0648   Skin Integrity Impairment, Risk/Actual (Adult)   Skin Integrity/Wound Healing making progress toward outcome         Problem: Respiratory  Insufficiency (Adult)  Goal: Identify Related Risk Factors and Signs and Symptoms  Outcome: Ongoing (interventions implemented as appropriate)    12/15/17 0648   Respiratory Insufficiency   Related Risk Factors (Respiratory Insufficiency) activity intolerance;bedrest;knowledge deficit;obesity;physiological factors;smoking   Signs and Symptoms (Respiratory Insufficiency) abnormal ABGs;abnormal breath sounds;decreased oxygen saturation;dyspnea;nasal flaring;respiratory rate alterations;shortness of breath;skin color changes;use of accessory muscles       Goal: Acid/Base Balance  Outcome: Ongoing (interventions implemented as appropriate)    12/15/17 0648   Respiratory Insufficiency (Adult)   Acid/Base Balance making progress toward outcome       Goal: Effective Ventilation  Outcome: Ongoing (interventions implemented as appropriate)    12/15/17 0648   Respiratory Insufficiency (Adult)   Effective Ventilation making progress toward outcome

## 2017-12-15 NOTE — PROGRESS NOTES
106      Salomón Rodriguez     LOS: 2 days   Patient Care Team:  Federico Macias MD as PCP - General  Federico Macias MD as PCP - Family Medicine  Federico Macias MD as PCP - Claims Attributed      Subjective     Interval History:     Patient Complaints: Patient with episode of SVT with heart rate to 130s.  Patient was started on diltiazem drip at 10 mg an hour bolus and then 5 g an hour infusion this was increased to 10 mg an hour.  Patient continued to have some tachycardia as well as increasing shortness of breath and hypertensive urgency.  Patient was transferred to the CCU for further care  Patient Denies:    History taken from: patient chart    Review of Systems:    All systems were reviewed and negative except for:    Objective     Vital Signs  Temp:  [97.9 °F (36.6 °C)-98.6 °F (37 °C)] 98.6 °F (37 °C)  Heart Rate:  [] 80  Resp:  [18-40] 23  BP: (145-239)/() 161/89  Lab Results (last 24 hours)     Procedure Component Value Units Date/Time    Troponin [[id number]]  (Abnormal) Collected:  12/14/17 1538    Specimen:  Blood Updated:  12/14/17 1640     Troponin I 0.224 (H) ng/mL     Narrative:       Ultra Troponin I Reference Range:         <=0.039 ng/mL: Negative    0.04-0.779 ng/mL: Indeterminate Range. Suspicious of MI.  Clinical correlation required.       >=0.78  ng/mL: Consistent with myocardial injury.  Clinical correlation required.    Troponin [[id number]]  (Abnormal) Collected:  12/14/17 1944    Specimen:  Blood Updated:  12/14/17 2036     Troponin I 0.173 (H) ng/mL     Narrative:       Ultra Troponin I Reference Range:         <=0.039 ng/mL: Negative    0.04-0.779 ng/mL: Indeterminate Range. Suspicious of MI.  Clinical correlation required.       >=0.78  ng/mL: Consistent with myocardial injury.  Clinical correlation required.    CBC & Differential [[id number]] Collected:  12/15/17 0046    Specimen:  Blood Updated:  12/15/17 0252    Narrative:       The following orders were created for  panel order CBC & Differential.  Procedure                               Abnormality         Status                     ---------                               -----------         ------                     CBC Auto Differential[[id number]]        Abnormal            Final result                 Please view results for these tests on the individual orders.    CBC Auto Differential [[id number]]  (Abnormal) Collected:  12/15/17 0046    Specimen:  Blood Updated:  12/15/17 0252     WBC 12.10 10*3/mm3      RBC 4.58 (L) 10*6/mm3      Hemoglobin 14.6 g/dL      Hematocrit 45.2 %      MCV 98.7 (H) fL      MCH 31.9 pg      MCHC 32.3 (L) g/dL      RDW 13.4 %      RDW-SD 46.9 fl      MPV 11.4 (H) fL      Platelets 134 10*3/mm3      Neutrophil % 83.9 (H) %      Lymphocyte % 7.1 (L) %      Monocyte % 6.9 %      Eosinophil % 1.7 %      Basophil % 0.1 %      Immature Grans % 0.3 %      Neutrophils, Absolute 10.16 (H) 10*3/mm3      Lymphocytes, Absolute 0.86 (L) 10*3/mm3      Monocytes, Absolute 0.83 10*3/mm3      Eosinophils, Absolute 0.20 10*3/mm3      Basophils, Absolute 0.01 10*3/mm3      Immature Grans, Absolute 0.04 (H) 10*3/mm3     POC Glucose Once [[id number]]  (Abnormal) Collected:  12/15/17 0308    Specimen:  Blood Updated:  12/15/17 0314     Glucose 138 (H) mg/dL     Narrative:       Meter: [id number] : [id number] Makenzie Alejandre    C-reactive Protein [[id number]]  (Normal) Collected:  12/15/17 0046    Specimen:  Blood Updated:  12/15/17 0322     C-Reactive Protein <0.50 mg/dL     Troponin [[id number]]  (Abnormal) Collected:  12/15/17 0046    Specimen:  Blood Updated:  12/15/17 0328     Troponin I 0.178 (H) ng/mL     Narrative:       Ultra Troponin I Reference Range:         <=0.039 ng/mL: Negative    0.04-0.779 ng/mL: Indeterminate Range. Suspicious of MI.  Clinical correlation required.       >=0.78  ng/mL: Consistent with myocardial injury.  Clinical correlation required.    Blood Culture - Blood, [[id number]]  (Normal)  Collected:  12/13/17 0313    Specimen:  Blood from Arm, Left Updated:  12/15/17 0331     Blood Culture No growth at 2 days    Myoglobin, Serum [[id number]]  (Normal) Collected:  12/15/17 0046    Specimen:  Blood Updated:  12/15/17 0331     Myoglobin 88.0 ng/mL     Basic Metabolic Panel [[id number]]  (Abnormal) Collected:  12/15/17 0046    Specimen:  Blood Updated:  12/15/17 0334     Glucose 108 mg/dL      BUN 23 (H) mg/dL      Creatinine 0.98 mg/dL      Sodium 137 mmol/L      Potassium 5.5 (H) mmol/L       1+ Hemolysis         Chloride 107 mmol/L      CO2 22.8 (L) mmol/L      Calcium 9.4 mg/dL      eGFR Non African Amer 76 mL/min/1.73      BUN/Creatinine Ratio 23.5     Anion Gap 7.2 mmol/L     Narrative:       GFR Normal >60  Chronic Kidney Disease <60  Kidney Failure <15    CK [[id number]]  (Normal) Collected:  12/15/17 0046    Specimen:  Blood Updated:  12/15/17 0334     Creatine Kinase 135 U/L       1+ Hemolysis        Osmolality, Calculated [[id number]]  (Normal) Collected:  12/15/17 0046    Specimen:  Blood Updated:  12/15/17 0334     Osmolality Calc 278.0 mOsm/kg     CK-MB Index [[id number]]  (Abnormal) Collected:  12/15/17 0046    Specimen:  Blood Updated:  12/15/17 0335     CK-MB Index 5.9 (H) %     CK-MB [[id number]]  (Abnormal) Collected:  12/15/17 0046    Specimen:  Blood Updated:  12/15/17 0337     CKMB 7.96 (C) ng/mL     Blood Gas, Arterial [[id number]]  (Abnormal) Collected:  12/15/17 0334    Specimen:  Arterial Blood Updated:  12/15/17 0344     Site Arterial: right radial     Ten's Test Positive     pH, Arterial 7.287 (C) pH units      pCO2, Arterial 50.8 (C) mm Hg      pO2, Arterial 86.3 mm Hg      HCO3, Arterial 23.7 mmol/L      Base Excess, Arterial -3.6 mmol/L      O2 Saturation, Arterial 95.7 %      Hemoglobin, Blood Gas 16.6 (H) g/dL      Hematocrit, Blood Gas 49.0 %      Oxyhemoglobin 94.6 %      Methemoglobin 0.20 %      Carboxyhemoglobin 0.9 %      A-a Gradiant 69.0 mmHg      Temperature 98.6 C       Barometric Pressure for Blood Gas 718 mmHg      Modality Cannula - Nasal     FIO2 32 %     Blood Culture - Blood, [[id number]]  (Normal) Collected:  12/13/17 0348    Specimen:  Blood from Arm, Left Updated:  12/15/17 0401     Blood Culture No growth at 2 days    Troponin [[id number]]  (Abnormal) Collected:  12/15/17 0320    Specimen:  Blood Updated:  12/15/17 0404     Troponin I 0.178 (H) ng/mL     Narrative:       Ultra Troponin I Reference Range:         <=0.039 ng/mL: Negative    0.04-0.779 ng/mL: Indeterminate Range. Suspicious of MI.  Clinical correlation required.       >=0.78  ng/mL: Consistent with myocardial injury.  Clinical correlation required.    Troponin [[id number]]  (Abnormal) Collected:  12/15/17 0510    Specimen:  Blood Updated:  12/15/17 0626     Troponin I 0.139 (H) ng/mL     Narrative:       Ultra Troponin I Reference Range:         <=0.039 ng/mL: Negative    0.04-0.779 ng/mL: Indeterminate Range. Suspicious of MI.  Clinical correlation required.       >=0.78  ng/mL: Consistent with myocardial injury.  Clinical correlation required.            Physical Exam:     General Appearance:    Alert, cooperative, in no acute distress   Head:    Normocephalic, without obvious abnormality, atraumatic   Eyes:            Lids and lashes normal, conjunctivae and sclerae normal, no   icterus, no pallor, corneas clear, PERRLA   Ears:    Ears appear intact with no abnormalities noted   Throat:   No oral lesions, no thrush, oral mucosa moist   Neck:   No adenopathy, supple, trachea midline, no thyromegaly, no     carotid bruit, no JVD   Back:     No kyphosis present, no scoliosis present, no skin lesions,       erythema or scars, no tenderness to percussion or                   palpation,   range of motion normal   Lungs:    Bilateral extra rhonchi and wheezes with only fair air movement     Heart:    Regular rhythm and normal rate, normal S1 and S2, no            murmur, no gallop, no rub, no click   Breast  Exam:    Deferred   Abdomen:     Normal bowel sounds, no masses, no organomegaly, soft        non-tender, non-distended, no guarding, no rebound                 tenderness   Genitalia:    Deferred   Extremities:   Moves all extremities well, no edema, no cyanosis, no              redness   Pulses:   Pulses palpable and equal bilaterally   Skin:   No bleeding, bruising or rash   Lymph nodes:   No palpable adenopathy   Neurologic:   Cranial nerves 2 - 12 grossly intact, sensation intact, DTR        present and equal bilaterally     Lab Results (last 24 hours)     Procedure Component Value Units Date/Time    Troponin [[id number]]  (Abnormal) Collected:  12/14/17 1538    Specimen:  Blood Updated:  12/14/17 1640     Troponin I 0.224 (H) ng/mL     Narrative:       Ultra Troponin I Reference Range:         <=0.039 ng/mL: Negative    0.04-0.779 ng/mL: Indeterminate Range. Suspicious of MI.  Clinical correlation required.       >=0.78  ng/mL: Consistent with myocardial injury.  Clinical correlation required.    Troponin [[id number]]  (Abnormal) Collected:  12/14/17 1944    Specimen:  Blood Updated:  12/14/17 2036     Troponin I 0.173 (H) ng/mL     Narrative:       Ultra Troponin I Reference Range:         <=0.039 ng/mL: Negative    0.04-0.779 ng/mL: Indeterminate Range. Suspicious of MI.  Clinical correlation required.       >=0.78  ng/mL: Consistent with myocardial injury.  Clinical correlation required.    CBC & Differential [[id number]] Collected:  12/15/17 0046    Specimen:  Blood Updated:  12/15/17 0252    Narrative:       The following orders were created for panel order CBC & Differential.  Procedure                               Abnormality         Status                     ---------                               -----------         ------                     CBC Auto Differential[[id number]]        Abnormal            Final result                 Please view results for these tests on the individual orders.    CBC Auto  Differential [[id number]]  (Abnormal) Collected:  12/15/17 0046    Specimen:  Blood Updated:  12/15/17 0252     WBC 12.10 10*3/mm3      RBC 4.58 (L) 10*6/mm3      Hemoglobin 14.6 g/dL      Hematocrit 45.2 %      MCV 98.7 (H) fL      MCH 31.9 pg      MCHC 32.3 (L) g/dL      RDW 13.4 %      RDW-SD 46.9 fl      MPV 11.4 (H) fL      Platelets 134 10*3/mm3      Neutrophil % 83.9 (H) %      Lymphocyte % 7.1 (L) %      Monocyte % 6.9 %      Eosinophil % 1.7 %      Basophil % 0.1 %      Immature Grans % 0.3 %      Neutrophils, Absolute 10.16 (H) 10*3/mm3      Lymphocytes, Absolute 0.86 (L) 10*3/mm3      Monocytes, Absolute 0.83 10*3/mm3      Eosinophils, Absolute 0.20 10*3/mm3      Basophils, Absolute 0.01 10*3/mm3      Immature Grans, Absolute 0.04 (H) 10*3/mm3     POC Glucose Once [[id number]]  (Abnormal) Collected:  12/15/17 0308    Specimen:  Blood Updated:  12/15/17 0314     Glucose 138 (H) mg/dL     Narrative:       Meter: [id number] : [id number] Makenzie Alejandre    C-reactive Protein [[id number]]  (Normal) Collected:  12/15/17 0046    Specimen:  Blood Updated:  12/15/17 0322     C-Reactive Protein <0.50 mg/dL     Troponin [[id number]]  (Abnormal) Collected:  12/15/17 0046    Specimen:  Blood Updated:  12/15/17 0328     Troponin I 0.178 (H) ng/mL     Narrative:       Ultra Troponin I Reference Range:         <=0.039 ng/mL: Negative    0.04-0.779 ng/mL: Indeterminate Range. Suspicious of MI.  Clinical correlation required.       >=0.78  ng/mL: Consistent with myocardial injury.  Clinical correlation required.    Blood Culture - Blood, [[id number]]  (Normal) Collected:  12/13/17 0313    Specimen:  Blood from Arm, Left Updated:  12/15/17 0331     Blood Culture No growth at 2 days    Myoglobin, Serum [[id number]]  (Normal) Collected:  12/15/17 0046    Specimen:  Blood Updated:  12/15/17 0331     Myoglobin 88.0 ng/mL     Basic Metabolic Panel [[id number]]  (Abnormal) Collected:  12/15/17 0046    Specimen:  Blood Updated:  12/15/17  0334     Glucose 108 mg/dL      BUN 23 (H) mg/dL      Creatinine 0.98 mg/dL      Sodium 137 mmol/L      Potassium 5.5 (H) mmol/L       1+ Hemolysis         Chloride 107 mmol/L      CO2 22.8 (L) mmol/L      Calcium 9.4 mg/dL      eGFR Non African Amer 76 mL/min/1.73      BUN/Creatinine Ratio 23.5     Anion Gap 7.2 mmol/L     Narrative:       GFR Normal >60  Chronic Kidney Disease <60  Kidney Failure <15    CK [[id number]]  (Normal) Collected:  12/15/17 0046    Specimen:  Blood Updated:  12/15/17 0334     Creatine Kinase 135 U/L       1+ Hemolysis        Osmolality, Calculated [[id number]]  (Normal) Collected:  12/15/17 0046    Specimen:  Blood Updated:  12/15/17 0334     Osmolality Calc 278.0 mOsm/kg     CK-MB Index [[id number]]  (Abnormal) Collected:  12/15/17 0046    Specimen:  Blood Updated:  12/15/17 0335     CK-MB Index 5.9 (H) %     CK-MB [[id number]]  (Abnormal) Collected:  12/15/17 0046    Specimen:  Blood Updated:  12/15/17 0337     CKMB 7.96 (C) ng/mL     Blood Gas, Arterial [[id number]]  (Abnormal) Collected:  12/15/17 0334    Specimen:  Arterial Blood Updated:  12/15/17 0344     Site Arterial: right radial     Ten's Test Positive     pH, Arterial 7.287 (C) pH units      pCO2, Arterial 50.8 (C) mm Hg      pO2, Arterial 86.3 mm Hg      HCO3, Arterial 23.7 mmol/L      Base Excess, Arterial -3.6 mmol/L      O2 Saturation, Arterial 95.7 %      Hemoglobin, Blood Gas 16.6 (H) g/dL      Hematocrit, Blood Gas 49.0 %      Oxyhemoglobin 94.6 %      Methemoglobin 0.20 %      Carboxyhemoglobin 0.9 %      A-a Gradiant 69.0 mmHg      Temperature 98.6 C      Barometric Pressure for Blood Gas 718 mmHg      Modality Cannula - Nasal     FIO2 32 %     Blood Culture - Blood, [[id number]]  (Normal) Collected:  12/13/17 0348    Specimen:  Blood from Arm, Left Updated:  12/15/17 0401     Blood Culture No growth at 2 days    Troponin [[id number]]  (Abnormal) Collected:  12/15/17 0320    Specimen:  Blood Updated:  12/15/17 7798      Troponin I 0.178 (H) ng/mL     Narrative:       Ultra Troponin I Reference Range:         <=0.039 ng/mL: Negative    0.04-0.779 ng/mL: Indeterminate Range. Suspicious of MI.  Clinical correlation required.       >=0.78  ng/mL: Consistent with myocardial injury.  Clinical correlation required.    Troponin [[id number]]  (Abnormal) Collected:  12/15/17 0510    Specimen:  Blood Updated:  12/15/17 0626     Troponin I 0.139 (H) ng/mL     Narrative:       Ultra Troponin I Reference Range:         <=0.039 ng/mL: Negative    0.04-0.779 ng/mL: Indeterminate Range. Suspicious of MI.  Clinical correlation required.       >=0.78  ng/mL: Consistent with myocardial injury.  Clinical correlation required.        Imaging Results (last 24 hours)     Procedure Component Value Units Date/Time    XR Chest 1 View [[id number]] Updated:  12/15/17 0423        Hospital Medications (active)       Dose Frequency Start End    albuterol (PROVENTIL) nebulizer solution 0.083% 2.5 mg/3mL 2.5 mg Every 6 Hours PRN 12/13/2017     Sig - Route: Take 2.5 mg by nebulization Every 6 (Six) Hours As Needed for Wheezing. - Nebulization    Notes to Pharmacy: Prior to [facility] Admission, Patient was on: 2 puffs every 4 to 6 hours as needed    aspirin EC tablet 81 mg 81 mg Daily 12/14/2017     Sig - Route: Take 1 tablet by mouth Daily. - Oral    CloNIDine (CATAPRES) tablet 0.1 mg 0.1 mg Once 12/15/2017 12/15/2017    Sig - Route: Take 1 tablet by mouth 1 (One) Time. - Oral    diltiaZEM (CARDIZEM) 100 mg/100 mL (1 mg/mL) NS infusion (ADV) 10 mg/hr Titrated 12/15/2017     Sig - Route: Infuse 10 mg/hr into a venous catheter Dose Adjusted By Provider As Needed. - Intravenous    diltiazem (CARDIZEM) bolus from bag 1 mg/mL 10 mg 10 mg Once 12/15/2017 12/15/2017    Sig - Route: Infuse 10 mg into a venous catheter 1 (One) Time. - Intravenous    diltiazem (CARDIZEM) bolus from bag 1 mg/mL 5 mg 5 mg Once 12/15/2017 12/15/2017    Sig - Route: Infuse 5 mg into a venous catheter  1 (One) Time. - Intravenous    Cosign for Ordering: Required by Nelson Ash MD    diltiaZEM (CARDIZEM) infusion  - ADS Override Pull   12/15/2017 12/15/2017    Notes to Pharmacy: Created by cabinet override    enoxaparin (LOVENOX) syringe 40 mg 40 mg Every 24 Hours 12/13/2017     Sig - Route: Inject 0.4 mL under the skin Daily. - Subcutaneous    furosemide (LASIX) injection 20 mg 20 mg Once 12/15/2017 12/15/2017    Sig - Route: Infuse 2 mL into a venous catheter 1 (One) Time. - Intravenous    furosemide (LASIX) tablet 20 mg 20 mg Daily 12/13/2017     Sig - Route: Take 1 tablet by mouth Daily. - Oral    ipratropium-albuterol (DUO-NEB) nebulizer solution 3 mL 3 mL Every 4 Hours PRN 12/15/2017     Sig - Route: Take 3 mL by nebulization Every 4 (Four) Hours As Needed for Wheezing or Shortness of Air. - Nebulization    lisinopril (PRINIVIL,ZESTRIL) tablet 20 mg 20 mg Daily 12/14/2017     Sig - Route: Take 2 tablets by mouth Daily. - Oral    methylPREDNISolone sodium succinate (SOLU-Medrol) injection 60 mg 60 mg Every 6 Hours Scheduled 12/13/2017     Sig - Route: Infuse 0.96 mL into a venous catheter Every 6 (Six) Hours. - Intravenous    metoprolol succinate XL (TOPROL-XL) 24 hr tablet 50 mg 50 mg Daily 12/13/2017     Sig - Route: Take 1 tablet by mouth Daily. - Oral    metoprolol tartrate (LOPRESSOR) injection 5 mg 5 mg Every 4 Hours PRN 12/15/2017     Sig - Route: Infuse 5 mL into a venous catheter Every 4 (Four) Hours As Needed (For SBP > 160 and HR > 60). - Intravenous    nitroglycerin (NITROSTAT) ointment 1 inch 1 inch Once 12/15/2017 12/15/2017    Sig - Route: Apply 1 inch topically 1 (One) Time. - Topical    regadenoson (LEXISCAN) injection 0.4 mg 0.4 mg Once in Imaging 12/14/2017 12/14/2017    Sig - Route: Infuse 5 mL into a venous catheter Once. - Intravenous    sodium chloride 0.9 % flush 1-10 mL 1-10 mL As Needed 12/13/2017     Sig - Route: Infuse 1-10 mL into a venous catheter As Needed for Line Care. -  "Intravenous    sodium chloride 0.9 % flush 10 mL 10 mL As Needed 12/13/2017     Sig - Route: Infuse 10 mL into a venous catheter As Needed for Line Care. - Intravenous    Cosign for Ordering: Accepted by Kulwinder Cho MD on 12/13/2017  4:44 AM    Linked Group 1:  \"And\" Linked Group Details        spironolactone (ALDACTONE) tablet 25 mg 25 mg Daily 12/13/2017     Sig - Route: Take 1 tablet by mouth Daily. - Oral    technetium sestamibi (CARDIOLITE) injection 1 dose 1 dose Once in Imaging 12/14/2017 12/14/2017    Sig - Route: Infuse 1 dose into a venous catheter Once. - Intravenous    technetium sestamibi (CARDIOLITE) injection 1 dose 1 dose Once in Imaging 12/14/2017 12/14/2017    Sig - Route: Infuse 1 dose into a venous catheter Once. - Intravenous    diltiaZEM (CARDIZEM) 100 mg/100 mL (1 mg/mL) NS infusion (ADV) (Discontinued) 5 mg/hr Titrated 12/15/2017 12/15/2017    Sig - Route: Infuse 5 mg/hr into a venous catheter Dose Adjusted By Provider As Needed. - Intravenous    Cosign for Ordering: Required by Nelson Ash MD    diltiaZEM (CARDIZEM) 100 mg/100 mL (1 mg/mL) NS infusion (ADV) (Discontinued) 5 mg/hr Titrated 12/15/2017 12/15/2017    Sig - Route: Infuse 5 mg/hr into a venous catheter Dose Adjusted By Provider As Needed. - Intravenous    ipratropium-albuterol (DUO-NEB) nebulizer solution 3 mL (Discontinued) 3 mL 4 Times Daily - RT 12/13/2017 12/15/2017    Sig - Route: Take 3 mL by nebulization 4 (Four) Times a Day. - Nebulization    nitroglycerin (NITROSTAT) ointment 1 inch (Discontinued) 1 inch Every 6 Hours Scheduled 12/15/2017 12/15/2017    Sig - Route: Apply 1 inch topically Every 6 (Six) Hours. - Topical    Cosign for Ordering: Required by Nelson Ash MD    nitroglycerin (NITROSTAT) ointment 1 inch (Discontinued) 1 inch Once 12/15/2017 12/15/2017    Sig - Route: Apply 1 inch topically 1 (One) Time. - Topical    Reason for Discontinue: Reorder    Cosign for Ordering: Required by Nelson Ash MD "    sodium chloride 0.9 % with KCl 20 mEq/L infusion (Discontinued) 100 mL/hr Continuous 12/13/2017 12/15/2017    Sig - Route: Infuse 100 mL/hr into a venous catheter Continuous. - Intravenous         Results Review:     I reviewed the patient's new clinical results.    Medications Reviewed    Assessment/Plan   1.  Acute respiratory failure   Patient currently on BiPAP   We'll consult the intensivist/pulmonology   Continue IV Solu-Medrol  2.  SVT   Patient has converted and is now in sinus rhythm heart rate in the 80s   Continue Cardizem drip   Cardiology is following  3.  COPD exacerbation   Continue IV Solu-Medrol as well as aggressive pulmonary treatments  4.  Chest pain   Patient had Lexiscan stress test yesterday which was low risk study  5.  Hypertension   Continue to closely monitor and will adjust vacations as needed  6.  History of CVA  7.  Coronary artery disease        Active Problems:    Elevated troponin              Nelson Ash MD  12/15/17  6:47 AM

## 2017-12-15 NOTE — PROGRESS NOTES
THC Physician - Brief Progress Note  PERMANENT  12/15/2017 08:17    [facility] ICU [facility]  [facility] - U - 10 - [place], [place] ([facility])    DEVEN CABA    Date of Service 12/15/2017 08:17    HPI/Events of Note Pt failing BIPAP with respiratory acidosis on ABG. Bedside MD in process of intubating pt.      Interventions Minor-Clinical assessment - ordering diagnostic tests        Electronically Signed by: Giovanni Enciso) on 12/15/2017 8:19 AM

## 2017-12-15 NOTE — PROGRESS NOTES
THC Physician - Brief Progress Note  PERMANENT  12/15/2017 06:09    Advanced ICU Care  [facility] - CCU - 10 - [place], [place] ([facility])    DEVEN CABA    Date of Service 12/15/2017 06:09    HPI/Events of Note AICU Provider Assessment Note    73 yo male with PMH of COPD and CHF was transferred from floor with Resp distress. On arrival to ICU, sating well on FM but in resp distress, tachycardic, tachypneic with use of accessory muscles    CXR- Hyperinflated lungs, Pulm edema  Echocardiogram-   ·The left ventricular cavity is mildly dilated.  ·Left ventricular systolic function is mildly decreased. Estimated EF = 45-50%.  ·Mild left ventricular global hypokinesis is noted.  ·Left ventricular diastolic dysfunction (grade II) consistent with pseudonormalization    PMH - CHF , COPD,  Hypertension, MI, Stroke      Assessment and Plan:  Acute on chronic Resp Failure secondary to AECOPD and CHF  Will place on Bipap - No N/V reported  Check ABG  CXR reviewed  DC IVF and Lasix 20 mg IV x 1  On Duoneb and steroids      ___Y__   Video Assessment performed  ___Y__   Most recent labs reviewed  ___Y__   Vital Signs reviewed  ___Y__   Best Practices addressed:                 VTE prophylaxis:Lovenox                 SUP (when indicated):NA                 Glycemic control:NA                      Please notify bedside physician when present or [facility] if glc > 180 X 2                    __Y___     Spoke with bedside RN  __Y___     Orders written      Contact [facility] for any needs if bedside physician is not present.      Interventions Major-Respiratory failure - evaluation and management        Electronically Signed by: Harry Duarte) on 12/15/2017 6:30 AM

## 2017-12-15 NOTE — NURSING NOTE
Pt received from 3S in severe respiratory distress with very high BP, rapid breathing, anxiety and tachycardia. Pt is unable to lay down at this time and skin assessment is not able to be done. Resendez is placed because pt received lasix and states he needs to use the restroom but needs to stand. This is not possible because of the respiratory distress this pt is in. AICU doctor on the camera and lopressor 5 given, pt placed on bipap. Pt did calm down and BP did start to drop. Pt doing well at this time and will continue to monitor.

## 2017-12-15 NOTE — NURSING NOTE
Dr. Quigley in room to intubate pt. Medications used as follows: 0818 propofol 5mg IV, 0819 Versed 2mg IV , 0819 propofol 5mg IV, 9829 Versed 2mg IV, 0821 propofol 5mg, 0822  ETT placed size 8 tube 24@ lip. )*24 Vecuronium 10mg given. )825 Propofol 5mg given and 0827 OG placed 65 @ lip and Propofol 5mg IV given. Breath sounds equal bilateral.

## 2017-12-15 NOTE — PROGRESS NOTES
Endotracheal Intubation Procedure Note    Indication for endotracheal intubation: Critically ill on vasopressors change in mental status    Sedation: Propofol -10 mL     Number of attempts: 1      Patient was emergently intubated as patient had altered mental status.  Patient was given propofol and vecuronium for sedation and paralysis.  Mac 4 blade was used to look at the vocal cords and grade view of Vocal cords was achieved.  Size 8 tube was inserted through the vocal cords.  Tube condensation was present.  End tidal CO2 color change was noticed for 10 breaths.  Bilateral breath sounds were present.    Patient's vitals remained normal.  Patient's pulse ox remained from % patient was put on assist control mode of ventilation.

## 2017-12-15 NOTE — CONSULTS
Referring Provider: Dr. MORIAH Ash  Reason for Consultation: Shortness of breath      Chief complaint shortness of breath      History of present illness:  Mr. Rodriguez is a 72 year old male  that presented to the emergency department for aevaluation of shortness of breath.  Patient said that it started 3 hours prior to arrival and was sudden in onset.  He states that he started to get worse.  He has a history of COPD .  Initial cardiac silhouette the ED were in the gray area.  The patient was admitted to the critical care unit for further management.  Patient's went into respiratory distress on 12/15/17.  He was placed on BIPAP.  ABG on BIPAP was acidotic. Patient's respiratory distress did not improve with BIPAP.  Patient was intubated.    Review Of Systems:    Review of Systems - History obtained from unobtainable from patient due to acuity of condition      History  Past Medical History:   Diagnosis Date   • CHF (congestive heart failure)    • COPD (chronic obstructive pulmonary disease)    • Hypertension    • MI (myocardial infarction)    • Stroke    , History reviewed. No pertinent surgical history., History reviewed. No pertinent family history., Social History   Substance Use Topics   • Smoking status: Former Smoker     Years: 55.00     Types: Cigarettes   • Smokeless tobacco: Current User      Comment: trying to quit   • Alcohol use No   , Prescriptions Prior to Admission   Medication Sig Dispense Refill Last Dose   • albuterol (PROVENTIL HFA;VENTOLIN HFA) 108 (90 Base) MCG/ACT inhaler Inhale 2 puffs Every 6 (Six) Hours As Needed for Wheezing. Prior to [facility] Admission, Patient was on: 2 puffs every 4 to 6 hours as needed   12/12/2017 at Unknown time   • aspirin 325 MG tablet Take 325 mg by mouth Daily.   12/12/2017 at Unknown time   • furosemide (LASIX) 20 MG tablet Take 20 mg by mouth Daily.   12/12/2017 at Unknown time   • lisinopril (PRINIVIL,ZESTRIL) 10 MG tablet Take 10 mg by mouth Daily.    12/12/2017 at Unknown time   • metoprolol succinate XL (TOPROL-XL) 50 MG 24 hr tablet Take 50 mg by mouth Daily.   12/12/2017 at Unknown time   • predniSONE (DELTASONE) 10 MG tablet Take 10 mg by mouth Daily As Needed (breathing). Prior to [facility] Admission, Patient was on: Filled on 12/05/17 for 10 day supply   12/12/2017 at Unknown time   • spironolactone (ALDACTONE) 25 MG tablet Take 25 mg by mouth Daily.   12/12/2017 at Unknown time   , Scheduled Meds:    aspirin 81 mg Oral Daily   enoxaparin 40 mg Subcutaneous Q24H   furosemide 20 mg Oral Daily   ipratropium-albuterol 3 mL Nebulization 4x Daily - RT   lisinopril 20 mg Oral Daily   methylPREDNISolone sodium succinate 40 mg Intravenous Q12H   metoprolol succinate XL 50 mg Oral Daily   spironolactone 25 mg Oral Daily   vecuronium 10 mg Intravenous Once   , Continuous Infusions:    diltiaZEM 10 mg/hr Last Rate: Stopped (12/15/17 0540)   fentaNYL (SUBLIMAZE) PCA 1500 mcg/30 mL syringe     norepinephrine 0.02-0.3 mcg/kg/min    propofol 5-50 mcg/kg/min Last Rate: 50 mcg/kg/min (12/15/17 0946)    and Allergies:  Review of patient's allergies indicates no known allergies.    Objective     Vital Signs   Temp:  [97.9 °F (36.6 °C)-98.6 °F (37 °C)] 98.6 °F (37 °C)  Heart Rate:  [] 75  Resp:  [18-40] 28  BP: ()/() 86/52  FiO2 (%):  [50 %] 50 %    Physical Exam:               GENERAL APPEARANCE: Well developed, well nourished, alert and cooperative, and appears to be in severe respiratory distress.    HEAD: normocephalic.    EYES: PERRL, EOMI. Fundi normal, vision is grossly intact.    THROAT: Oral cavity and pharynx normal. No inflammation, swelling, exudate, or lesions.     NECK: Neck supple.     CARDIAC: Normal S1 and S2. No S3, S4 or murmurs. Rhythm is regular. There is no peripheral edema, cyanosis or pallor. Extremities are warm and well perfused. Capillary refill is less than 2 seconds. No carotid bruits.    RESPIRATORY: Bilateral rhonchi.    GI:  Positive bowel sounds. Soft, nondistended, nontender.     MUSCULOSKELETAL: No significant deformity or joint abnormality. No edema. Peripheral pulses intact. No varicosities.    NEUROLOGICAL: Strength and sensation symmetric and intact throughout.     PSYCHIATRIC:  Unable to assess due to acuity of condition.                Results Review:    LABS:    Lab Results   Component Value Date    GLUCOSE 108 12/15/2017    BUN 23 (H) 12/15/2017    CREATININE 0.98 12/15/2017    EGFRIFNONA 76 12/15/2017    BCR 23.5 12/15/2017    CO2 22.8 (L) 12/15/2017    CALCIUM 9.4 12/15/2017    ALBUMIN 4.30 12/13/2017    LABIL2 1.4 (L) 12/13/2017    AST 40 (H) 12/13/2017    ALT 41 12/13/2017    WBC 12.10 12/15/2017    HGB 14.6 12/15/2017    HCT 45.2 12/15/2017    MCV 98.7 (H) 12/15/2017     12/15/2017     12/15/2017    K 5.5 (H) 12/15/2017     12/15/2017    ANIONGAP 7.2 12/15/2017       Lab Results   Component Value Date    INR 0.94 08/18/2015    PROTIME 10.4 08/18/2015                           I reviewed the patient's new clinical results.  I reviewed the patient's new imaging results and agree with the interpretation.      Assessment/Plan     Neuro:  Patient is unable to answer questions due to severe respiratory distress.  He is awake and alert.    Respiratory: Respiratory failure likely due to volume overload and underlying lung disease.    Patient was on BIPAP in severe respiratory distress.  Using accessory muscles to breath.  ABG on BIPAP- pH 7.22, pCO2 64 and bicarbonate 26.    Patient was intubated.  Procedure discussed with patient and family.  See procedure note.  Placed on ventilator with settings of , RR 22, FIO2 50% and PEEP 5.    Started on propofol and fentanyl for sedation.    Changed solumedrol to 40 mg IV every 12 hours.    Started Duo nebs 4 times daily routine.    Continue PRN neb treatments.    ID:  WBC 12.10, neutrophils 83.  CRP < 0.5.  Continue to monitor.    Cardiac: hypertensive prior to  intubation.  Post intubation blood pressure improved. BP within normal range.  NSR.  Continuous ECG, BP and pulse ox monitoring. Maintain MAP > 65.    Elevated troponins.  ECHO reviewed. Troponin trending down.  Cardiology is following.    Renal:  BUN/Creatinine WNL.  Strict I&Os.   Continue to monitor.    Endocrine: monitor glucose targeting 140-180.    Electrolytes:  Monitor levels, manage and replete per protocols.    Hyperkalemia:  Level 5.5.  Continue to monitor.    GI: NG tube placed.  Consulted nutrition for tube feedings.              Active Problems:    Elevated troponin          I discussed the patients findings and my recommendations with patient, family, nursing staff and consulting provider    SOILA Yung  12/15/17  10:05 AM      Attestation: Scribed for Dr. Quigley by SOILA Loo        I, Michael Quigley M.D. attest that the above note accurately reflects the work and decisions made  by me.  Patient was seen and evaluated by Dr. Quigley, including history of present illness, physical exam, assessment, and treatment plan.  The above note was reviewed and edited by Dr. Quigley.  Critical Care time spent in direct patient care: 33 minutes (excluding procedure time, if applicable) including high complexity decision making to assess, manipulate, and support vital organ system failure in this individual who has impairment of one or more vital organ systems such that there is a high probability of imminent or life threatening deterioration in the patient’s condition.

## 2017-12-15 NOTE — PROGRESS NOTES
Nutrition Services    Patient Name:  Salomón Rodriguez  YOB: 1945  MRN: [id number]  Admit Date:  12/13/2017    Recommend TF - Promote at goal rate of 65ml/hr - start at 25ml/hr  TF will provide 1310 ml water at goal rate - MD to manage additional flushes as needed.    Electronically signed by:  Shanna Renner RD  12/15/17 12:25 PM

## 2017-12-15 NOTE — NURSING NOTE
"Pt restless, refusing to sit in bed, assisted to chair in tripod position , teaching on pursed lip breathing, o2 at 2 liters saturation 92% hr , family \"william\" notified , left message  "

## 2017-12-15 NOTE — PROGRESS NOTES
"Reason for follow-up:  Troponin I indeterminant range and CHF    Subjective:    Planes of cough with scanty expectoration, wheezing and slightly increased shortness of breath.  Appears to be an exacerbation of COPD.  No history of chest pain.      Medication Review:     aspirin 81 mg Oral Daily   enoxaparin 40 mg Subcutaneous Q24H   furosemide 20 mg Oral Daily   ipratropium-albuterol 3 mL Nebulization 4x Daily - RT   lisinopril 20 mg Oral Daily   methylPREDNISolone sodium succinate 60 mg Intravenous Q6H   metoprolol succinate XL 50 mg Oral Daily   spironolactone 25 mg Oral Daily         Vital Sign Min/Max for last 24 hours  Temp  Min: 97.9 °F (36.6 °C)  Max: 98.6 °F (37 °C)   BP  Min: 150/83  Max: 169/89   Pulse  Min: 60  Max: 96   Resp  Min: 18  Max: 24   SpO2  Min: 90 %  Max: 97 %   Flow (L/min)  Min: 2  Max: 2   Weight  Min: 91.8 kg (202 lb 4.8 oz)  Max: 91.8 kg (202 lb 4.8 oz)     Flowsheet Rows         First Filed Value    Admission Height  167.6 cm (66\") Documented at 12/13/2017 0250    Admission Weight  88.5 kg (195 lb) Documented at 12/13/2017 0250          Physical Exam:     General Appearance:    Alert, cooperative, in no acute distress   Head:    Normocephalic, without obvious abnormality, atraumatic   Eyes:            Lids and lashes normal, conjunctivae and sclerae normal, no   icterus, no pallor, corneas clear, PERRLA   Ears:    Ears appear intact with no abnormalities noted   Throat:   No oral lesions, no thrush, oral mucosa moist   Neck:   No adenopathy, supple, trachea midline, no thyromegaly, no   carotid bruit, no JVD   Back:     No kyphosis present, no scoliosis present, no skin lesions,      erythema or scars, no tenderness to percussion or                   palpation,   range of motion normal   Lungs:     Clear to auscultation,respirations regular, even and                  unlabored    Heart:    Regular rhythm and normal rate, normal S1 and S2, no            murmur, no gallop, no rub, no click "   Chest Wall:    No abnormalities observed   Abdomen:     Normal bowel sounds, no masses, no organomegaly, soft        non-tender, non-distended, no guarding, no rebound                tenderness   Rectal:     Deferred   Extremities:   Moves all extremities well, no edema, no cyanosis, no             redness          Telemetry  Normal sinus rhythm    Echocardiogram-    · The left ventricular cavity is mildly dilated.  · Left ventricular systolic function is mildly decreased. Estimated EF = 45-50%.  · Mild left ventricular global hypokinesis is noted.  · Left ventricular diastolic dysfunction (grade II) consistent with pseudonormalization.  · The aortic valve is structurally normal. No aortic valve regurgitation is present. No aortic valve stenosis is present.  · Moderate mitral valve regurgitation is present  · Mild tricuspid valve regurgitation is present.  · Mild pulmonary hypertension is present.  · There is no evidence of pericardial effusion      Nuclear stress test-    · Patient experienced no chest pain and ECG showed no eidence of ischemia during the test.  · No evidence of Lexiscan induced ischemia.  · A small sized moderate fixed defect in the apical region possibly represent apical thinning, normal variant  · Left ventricular ejection fraction is mildly reduced.  · Mild persistent left ventricular cavity dilatation. Mild left ventricular global hypokinesis.  · Impressions are consistent with a low risk study.          Labs    Results from last 7 days  Lab Units 12/14/17  0029 12/13/17  0313   WBC 10*3/mm3 9.80 12.96*   HEMOGLOBIN g/dL 14.4 16.1   HEMATOCRIT % 43.5 47.8   PLATELETS 10*3/mm3 144 186       Results from last 7 days  Lab Units 12/14/17  0029 12/13/17  0313   SODIUM mmol/L 136 140   POTASSIUM mmol/L 4.4 4.2   CHLORIDE mmol/L 104 109   CO2 mmol/L 23.7* 21.7*   BUN mg/dL 21 19   CREATININE mg/dL 1.05 0.97   CALCIUM mg/dL 9.3 8.6   GLUCOSE mg/dL 122* 125*       Results from last 7 days  Lab Units  12/13/17  0313   BILIRUBIN mg/dL 0.4   ALK PHOS U/L 75   AST (SGOT) U/L 40*   ALT (SGPT) U/L 41                   Results from last 7 days  Lab Units 12/14/17  1944 12/14/17  1538 12/14/17  0448  12/13/17  0517   CK TOTAL U/L  --   --   --   --  65   TROPONIN I ng/mL 0.173* 0.224* 0.330*  < > 0.255*   CK MB INDEX %  --   --   --   --  5.7*   < > = values in this interval not displayed.    Results from last 7 days  Lab Units 12/13/17  0308   PH, ARTERIAL pH units 7.336*   PO2 ART mm Hg 74.6*   PCO2, ARTERIAL mm Hg 43.2   HCO3 ART mmol/L 22.6         Assessment    1.  Troponin I-indeterminate range.  No chest pain.  Nuclear stress test showed no evidence of ischemia.  2.  Nonischemic cardiomyopathy.  EF 45-50%.  No clinical CHF  3.  Hypertension  4.  Exacerbation of COPD      Plan    1.  Results of stress test and echocardiogram were discussed with the patient.  Reassured.  2.  Continue lisinopril, metoprolol XL, spironolactone and Lasix for cardiomyopathy.    .    Mann Romero MD  12/14/17  8:42 PM

## 2017-12-16 LAB
A-A DO2: 225.2 MMHG (ref 0–300)
ALBUMIN SERPL-MCNC: 3.8 G/DL (ref 3.4–4.8)
ALBUMIN/GLOB SERPL: 1.5 G/DL (ref 1.5–2.5)
ALP SERPL-CCNC: 57 U/L (ref 40–129)
ALT SERPL W P-5'-P-CCNC: 115 U/L (ref 10–44)
ANION GAP SERPL CALCULATED.3IONS-SCNC: 7.7 MMOL/L (ref 3.6–11.2)
ARTERIAL PATENCY WRIST A: ABNORMAL
AST SERPL-CCNC: 40 U/L (ref 10–34)
ATMOSPHERIC PRESS: 722 MMHG
BASE EXCESS BLDA CALC-SCNC: 1.8 MMOL/L
BASOPHILS # BLD AUTO: 0 10*3/MM3 (ref 0–0.3)
BASOPHILS NFR BLD AUTO: 0 % (ref 0–2)
BDY SITE: ABNORMAL
BILIRUB SERPL-MCNC: 0.4 MG/DL (ref 0.2–1.8)
BODY TEMPERATURE: 98.6 C
BUN BLD-MCNC: 32 MG/DL (ref 7–21)
BUN/CREAT SERPL: 26.4 (ref 7–25)
CALCIUM SPEC-SCNC: 8.7 MG/DL (ref 7.7–10)
CHLORIDE SERPL-SCNC: 103 MMOL/L (ref 99–112)
CO2 SERPL-SCNC: 30.3 MMOL/L (ref 24.3–31.9)
COHGB MFR BLD: 1.2 % (ref 0–5)
CREAT BLD-MCNC: 1.21 MG/DL (ref 0.43–1.29)
CRP SERPL-MCNC: <0.5 MG/DL (ref 0–0.99)
DEPRECATED RDW RBC AUTO: 46.8 FL (ref 37–54)
EOSINOPHIL # BLD AUTO: 0 10*3/MM3 (ref 0–0.7)
EOSINOPHIL NFR BLD AUTO: 0 % (ref 0–7)
ERYTHROCYTE [DISTWIDTH] IN BLOOD BY AUTOMATED COUNT: 13.4 % (ref 11.5–14.5)
GFR SERPL CREATININE-BSD FRML MDRD: 59 ML/MIN/1.73
GLOBULIN UR ELPH-MCNC: 2.5 GM/DL
GLUCOSE BLD-MCNC: 102 MG/DL (ref 70–110)
HCO3 BLDA-SCNC: 27 MMOL/L (ref 22–26)
HCT VFR BLD AUTO: 45.4 % (ref 42–52)
HCT VFR BLD CALC: 46 % (ref 42–52)
HGB BLD-MCNC: 14.8 G/DL (ref 14–18)
HGB BLDA-MCNC: 15.6 G/DL (ref 12–16)
HOROWITZ INDEX BLD+IHG-RTO: 50 %
IMM GRANULOCYTES # BLD: 0.02 10*3/MM3 (ref 0–0.03)
IMM GRANULOCYTES NFR BLD: 0.2 % (ref 0–0.5)
LYMPHOCYTES # BLD AUTO: 0.83 10*3/MM3 (ref 1–3)
LYMPHOCYTES NFR BLD AUTO: 6.7 % (ref 16–46)
MCH RBC QN AUTO: 31.9 PG (ref 27–33)
MCHC RBC AUTO-ENTMCNC: 32.6 G/DL (ref 33–37)
MCV RBC AUTO: 97.8 FL (ref 80–94)
METHGB BLD QL: 0.4 % (ref 0–3)
MODALITY: ABNORMAL
MONOCYTES # BLD AUTO: 0.84 10*3/MM3 (ref 0.1–0.9)
MONOCYTES NFR BLD AUTO: 6.8 % (ref 0–12)
NEUTROPHILS # BLD AUTO: 10.64 10*3/MM3 (ref 1.4–6.5)
NEUTROPHILS NFR BLD AUTO: 86.3 % (ref 40–75)
OSMOLALITY SERPL CALC.SUM OF ELEC: 288.4 MOSM/KG (ref 273–305)
OXYHGB MFR BLDV: 89.7 % (ref 85–100)
PCO2 BLDA: 44.3 MM HG (ref 35–45)
PEEP RESPIRATORY: 5 CM[H2O]
PH BLDA: 7.4 PH UNITS (ref 7.35–7.45)
PLATELET # BLD AUTO: 167 10*3/MM3 (ref 130–400)
PMV BLD AUTO: 10.4 FL (ref 6–10)
PO2 BLDA: 62.5 MM HG (ref 80–100)
POTASSIUM BLD-SCNC: 4.3 MMOL/L (ref 3.5–5.3)
PROT SERPL-MCNC: 6.3 G/DL (ref 6–8)
RBC # BLD AUTO: 4.64 10*6/MM3 (ref 4.7–6.1)
SAO2 % BLDCOA: 91.2 % (ref 90–100)
SET MECH RESP RATE: 22
SODIUM BLD-SCNC: 141 MMOL/L (ref 135–153)
TROPONIN I SERPL-MCNC: 0.39 NG/ML
TROPONIN I SERPL-MCNC: 0.43 NG/ML
TROPONIN I SERPL-MCNC: 0.51 NG/ML
TROPONIN I SERPL-MCNC: 0.58 NG/ML
VENTILATOR MODE: ABNORMAL
VT ON VENT VENT: 450 ML
WBC NRBC COR # BLD: 12.33 10*3/MM3 (ref 4.5–12.5)

## 2017-12-16 PROCEDURE — 25010000002 PROPOFOL 1000 MG/ML EMULSION: Performed by: INTERNAL MEDICINE

## 2017-12-16 PROCEDURE — 83050 HGB METHEMOGLOBIN QUAN: CPT | Performed by: FAMILY MEDICINE

## 2017-12-16 PROCEDURE — 82375 ASSAY CARBOXYHB QUANT: CPT | Performed by: FAMILY MEDICINE

## 2017-12-16 PROCEDURE — 25010000002 METHYLPREDNISOLONE PER 40 MG: Performed by: NURSE PRACTITIONER

## 2017-12-16 PROCEDURE — 94799 UNLISTED PULMONARY SVC/PX: CPT

## 2017-12-16 PROCEDURE — 82805 BLOOD GASES W/O2 SATURATION: CPT | Performed by: FAMILY MEDICINE

## 2017-12-16 PROCEDURE — 25010000002 ENOXAPARIN PER 10 MG: Performed by: FAMILY MEDICINE

## 2017-12-16 PROCEDURE — 84484 ASSAY OF TROPONIN QUANT: CPT | Performed by: FAMILY MEDICINE

## 2017-12-16 PROCEDURE — 36600 WITHDRAWAL OF ARTERIAL BLOOD: CPT | Performed by: FAMILY MEDICINE

## 2017-12-16 PROCEDURE — 25010000002 METHYLPREDNISOLONE PER 125 MG: Performed by: NURSE PRACTITIONER

## 2017-12-16 PROCEDURE — 85025 COMPLETE CBC W/AUTO DIFF WBC: CPT | Performed by: FAMILY MEDICINE

## 2017-12-16 PROCEDURE — 94003 VENT MGMT INPAT SUBQ DAY: CPT

## 2017-12-16 PROCEDURE — 80053 COMPREHEN METABOLIC PANEL: CPT | Performed by: FAMILY MEDICINE

## 2017-12-16 PROCEDURE — 25010000002 FUROSEMIDE PER 20 MG: Performed by: INTERNAL MEDICINE

## 2017-12-16 PROCEDURE — 86140 C-REACTIVE PROTEIN: CPT | Performed by: FAMILY MEDICINE

## 2017-12-16 PROCEDURE — 25010000002 DOPAMINE PER 40 MG: Performed by: INTERNAL MEDICINE

## 2017-12-16 RX ORDER — FAMOTIDINE 10 MG/ML
20 INJECTION, SOLUTION INTRAVENOUS EVERY 12 HOURS SCHEDULED
Status: DISCONTINUED | OUTPATIENT
Start: 2017-12-16 | End: 2017-12-20 | Stop reason: HOSPADM

## 2017-12-16 RX ORDER — FUROSEMIDE 10 MG/ML
20 INJECTION INTRAMUSCULAR; INTRAVENOUS DAILY
Status: DISCONTINUED | OUTPATIENT
Start: 2017-12-16 | End: 2017-12-19

## 2017-12-16 RX ADMIN — PROPOFOL 50 MCG/KG/MIN: 10 INJECTION, EMULSION INTRAVENOUS at 18:06

## 2017-12-16 RX ADMIN — METHYLPREDNISOLONE SODIUM SUCCINATE 40 MG: 125 INJECTION, POWDER, FOR SOLUTION INTRAMUSCULAR; INTRAVENOUS at 05:28

## 2017-12-16 RX ADMIN — IPRATROPIUM BROMIDE AND ALBUTEROL SULFATE 3 ML: .5; 3 SOLUTION RESPIRATORY (INHALATION) at 06:43

## 2017-12-16 RX ADMIN — ASPIRIN 81 MG: 81 TABLET ORAL at 09:31

## 2017-12-16 RX ADMIN — DOPAMINE HYDROCHLORIDE 2 MCG/KG/MIN: 160 INJECTION, SOLUTION INTRAVENOUS at 22:38

## 2017-12-16 RX ADMIN — PROPOFOL 50 MCG/KG/MIN: 10 INJECTION, EMULSION INTRAVENOUS at 22:19

## 2017-12-16 RX ADMIN — IPRATROPIUM BROMIDE AND ALBUTEROL SULFATE 3 ML: .5; 3 SOLUTION RESPIRATORY (INHALATION) at 12:34

## 2017-12-16 RX ADMIN — Medication 1500 MCG: at 09:30

## 2017-12-16 RX ADMIN — Medication 10 ML: at 09:33

## 2017-12-16 RX ADMIN — FUROSEMIDE 20 MG: 10 INJECTION, SOLUTION INTRAMUSCULAR; INTRAVENOUS at 14:32

## 2017-12-16 RX ADMIN — IPRATROPIUM BROMIDE AND ALBUTEROL SULFATE 3 ML: .5; 3 SOLUTION RESPIRATORY (INHALATION) at 18:52

## 2017-12-16 RX ADMIN — PROPOFOL 50 MCG/KG/MIN: 10 INJECTION, EMULSION INTRAVENOUS at 03:23

## 2017-12-16 RX ADMIN — Medication: at 19:13

## 2017-12-16 RX ADMIN — FUROSEMIDE 20 MG: 20 TABLET ORAL at 09:31

## 2017-12-16 RX ADMIN — Medication 10 ML: at 21:00

## 2017-12-16 RX ADMIN — PROPOFOL 50 MCG/KG/MIN: 10 INJECTION, EMULSION INTRAVENOUS at 06:30

## 2017-12-16 RX ADMIN — METHYLPREDNISOLONE SODIUM SUCCINATE 40 MG: 125 INJECTION, POWDER, FOR SOLUTION INTRAMUSCULAR; INTRAVENOUS at 18:10

## 2017-12-16 RX ADMIN — ENOXAPARIN SODIUM 40 MG: 40 INJECTION SUBCUTANEOUS at 09:32

## 2017-12-16 RX ADMIN — PROPOFOL 50 MCG/KG/MIN: 10 INJECTION, EMULSION INTRAVENOUS at 14:32

## 2017-12-16 RX ADMIN — FAMOTIDINE 20 MG: 10 INJECTION INTRAVENOUS at 22:34

## 2017-12-16 RX ADMIN — IPRATROPIUM BROMIDE AND ALBUTEROL SULFATE 3 ML: .5; 3 SOLUTION RESPIRATORY (INHALATION) at 01:09

## 2017-12-16 RX ADMIN — PROPOFOL 50 MCG/KG/MIN: 10 INJECTION, EMULSION INTRAVENOUS at 10:47

## 2017-12-16 NOTE — PROGRESS NOTES
"Reason for follow-up:  CHF    Subjective:    Patient has been intubated because of acute on chronic respiratory failure as a result of exacerbation of COPD.  Since yesterday, he has sinus bradycardia with a heart rate in 40s needing IV dopamine infusion to keep the heart rate more than 50 bpm.      Discussed with patient's daughter.  Patient had history of cardiomyopathy with ejection fraction 30-35% about 4 years ago when his cardiac catheterization was normal.  Has ejection fraction improved to more than 35%, defibrillator was not implanted.  He was unlikely resting between.      Medication Review:     aspirin 81 mg Oral Daily   enoxaparin 40 mg Subcutaneous Q24H   furosemide 20 mg Oral Daily   ipratropium-albuterol 3 mL Nebulization 4x Daily - RT   lisinopril 20 mg Oral Daily   methylPREDNISolone sodium succinate 40 mg Intravenous Q12H   sodium chloride 10 mL Intracatheter Q12H   spironolactone 25 mg Oral Daily   vecuronium 10 mg Intravenous Once         Vital Sign Min/Max for last 24 hours  Temp  Min: 94.9 °F (34.9 °C)  Max: 98.8 °F (37.1 °C)   BP  Min: 93/65  Max: 157/95   Pulse  Min: 48  Max: 72   Resp  Min: 20  Max: 22   SpO2  Min: 89 %  Max: 98 %   No Data Recorded   No Data Recorded     Flowsheet Rows         First Filed Value    Admission Height  167.6 cm (66\") Documented at 12/13/2017 0250    Admission Weight  88.5 kg (195 lb) Documented at 12/13/2017 0250          Physical Exam:     Gen.-intubated on mechanical ventilator  ENT-grossly normal  Cardiovascular-bradycardia.  Regular rhythm.  No murmurs  Lungs-bilateral equal air entry.  No wheezes were heard  Abdomen-soft bowel sounds are heard  Extremity-no pedal edema     Telemetry  Sinus bradycardia, heart rate 50-50 5 bpm        Labs    Results from last 7 days  Lab Units 12/16/17  0544 12/15/17  0046 12/14/17  0029 12/13/17  0313   WBC 10*3/mm3 12.33 12.10 9.80 12.96*   HEMOGLOBIN g/dL 14.8 14.6 14.4 16.1   HEMATOCRIT % 45.4 45.2 43.5 47.8   PLATELETS " 10*3/mm3 167 134 144 186       Results from last 7 days  Lab Units 12/16/17  0544 12/15/17  0046 12/14/17  0029 12/13/17  0313   SODIUM mmol/L 141 137 136 140   POTASSIUM mmol/L 4.3 5.5* 4.4 4.2   CHLORIDE mmol/L 103 107 104 109   CO2 mmol/L 30.3 22.8* 23.7* 21.7*   BUN mg/dL 32* 23* 21 19   CREATININE mg/dL 1.21 0.98 1.05 0.97   CALCIUM mg/dL 8.7 9.4 9.3 8.6   GLUCOSE mg/dL 102 108 122* 125*       Results from last 7 days  Lab Units 12/16/17  0544 12/13/17  0313   BILIRUBIN mg/dL 0.4 0.4   ALK PHOS U/L 57 75   AST (SGOT) U/L 40* 40*   ALT (SGPT) U/L 115* 41               Results from last 7 days  Lab Units 12/16/17  0544 12/15/17  0046   CRP mg/dL <0.50 <0.50       Results from last 7 days  Lab Units 12/16/17  0544 12/16/17  0052 12/15/17  1738  12/15/17  0046  12/13/17  0517   CK TOTAL U/L  --   --   --   --  135  --  65   TROPONIN I ng/mL 0.506* 0.583* 0.568*  < > 0.178*  < > 0.255*   CK MB INDEX %  --   --   --   --  5.9*  --  5.7*   MYOGLOBIN ng/mL  --   --   --   --  88.0  --   --    < > = values in this interval not displayed.    Results from last 7 days  Lab Units 12/16/17  0453   PH, ARTERIAL pH units 7.403   PO2 ART mm Hg 62.5*   PCO2, ARTERIAL mm Hg 44.3   HCO3 ART mmol/L 27.0*         Assessment    1.  Chronic systolic heart failure due to nonischemic cardiomyopathy.  EF 40-45%.  Clinically-compensated.  2.  Sinus bradycardia  3.  Hypertension-well controlled  4.  Acute on chronic respiratory failure due to exacerbation of COPD.  Intubated on mechanical ventilator    Plan    Hold metoprolol ER because of bradycardia  Continue dopamine infusion to keep the heart rate more than 50 bpm  Continue lisinopril, spironolactone and Lasix.  Discussed with patient's daughter about his condition and plan of care    .    Mann Romero MD  12/16/17  10:28 AM

## 2017-12-16 NOTE — PLAN OF CARE
Problem: Patient Care Overview (Adult)  Goal: Plan of Care Review  Outcome: Ongoing (interventions implemented as appropriate)  Goal: Discharge Needs Assessment  Outcome: Ongoing (interventions implemented as appropriate)    Problem: Pain, Acute (Adult)  Goal: Acceptable Pain Control/Comfort Level  Outcome: Ongoing (interventions implemented as appropriate)    Problem: Cardiac Output, Decreased (Adult)  Goal: Adequate Cardiac Output/Effective Tissue Perfusion  Outcome: Ongoing (interventions implemented as appropriate)    Problem: Skin Integrity Impairment, Risk/Actual (Adult)  Goal: Identify Related Risk Factors and Signs and Symptoms  Outcome: Ongoing (interventions implemented as appropriate)  Goal: Skin Integrity/Wound Healing  Outcome: Ongoing (interventions implemented as appropriate)    Problem: Respiratory Insufficiency (Adult)  Goal: Identify Related Risk Factors and Signs and Symptoms  Outcome: Ongoing (interventions implemented as appropriate)  Goal: Acid/Base Balance  Outcome: Ongoing (interventions implemented as appropriate)  Goal: Effective Ventilation  Outcome: Ongoing (interventions implemented as appropriate)    Problem: Pressure Ulcer Risk (Junior Scale) (Adult,Obstetrics,Pediatric)  Goal: Identify Related Risk Factors and Signs and Symptoms  Outcome: Ongoing (interventions implemented as appropriate)  Goal: Skin Integrity  Outcome: Ongoing (interventions implemented as appropriate)    Problem: Mechanical Ventilation, Invasive (Adult)  Goal: Signs and Symptoms of Listed Potential Problems Will be Absent or Manageable (Mechanical Ventilation, Invasive)  Outcome: Ongoing (interventions implemented as appropriate)

## 2017-12-16 NOTE — PLAN OF CARE
Problem: Patient Care Overview (Adult)  Goal: Plan of Care Review  Outcome: Ongoing (interventions implemented as appropriate)    12/16/17 0845   Coping/Psychosocial Response Interventions   Plan Of Care Reviewed With patient   Patient Care Overview   Progress progress toward functional goals as expected       Goal: Discharge Needs Assessment  Outcome: Ongoing (interventions implemented as appropriate)    12/16/17 0845   Discharge Needs Assessment   Discharge Disposition still a patient         Problem: Pain, Acute (Adult)  Goal: Acceptable Pain Control/Comfort Level  Outcome: Ongoing (interventions implemented as appropriate)    12/16/17 0845   Pain, Acute (Adult)   Acceptable Pain Control/Comfort Level making progress toward outcome         Problem: Cardiac Output, Decreased (Adult)  Goal: Adequate Cardiac Output/Effective Tissue Perfusion  Outcome: Ongoing (interventions implemented as appropriate)    12/16/17 0845   Cardiac Output, Decreased (Adult)   Adequate Cardiac Output/Effective Tissue Perfusion making progress toward outcome         Problem: Skin Integrity Impairment, Risk/Actual (Adult)  Goal: Identify Related Risk Factors and Signs and Symptoms  Outcome: Ongoing (interventions implemented as appropriate)    12/16/17 0845   Skin Integrity Impairment, Risk/Actual   Skin Integrity Impairment, Risk/Actual: Related Risk Factors cognitive impairment;fluid/nutrition status       Goal: Skin Integrity/Wound Healing  Outcome: Ongoing (interventions implemented as appropriate)    12/16/17 0845   Skin Integrity Impairment, Risk/Actual (Adult)   Skin Integrity/Wound Healing making progress toward outcome         Problem: Respiratory Insufficiency (Adult)  Goal: Identify Related Risk Factors and Signs and Symptoms  Outcome: Ongoing (interventions implemented as appropriate)    12/16/17 0845   Respiratory Insufficiency   Related Risk Factors (Respiratory Insufficiency) activity intolerance;bedrest   Signs and Symptoms  (Respiratory Insufficiency) abnormal ABGs;abnormal breath sounds       Goal: Effective Ventilation  Outcome: Ongoing (interventions implemented as appropriate)    12/16/17 0845   Respiratory Insufficiency (Adult)   Effective Ventilation making progress toward outcome         Problem: Pressure Ulcer Risk (Junior Scale) (Adult,Obstetrics,Pediatric)  Goal: Identify Related Risk Factors and Signs and Symptoms  Outcome: Ongoing (interventions implemented as appropriate)    12/16/17 0845   Pressure Ulcer Risk (Junior Scale)   Related Risk Factors (Pressure Ulcer Risk (Junior Scale)) medical devices;mechanical forces;medication       Goal: Skin Integrity  Outcome: Ongoing (interventions implemented as appropriate)    12/16/17 0845   Pressure Ulcer Risk (Junior Scale) (Adult,Obstetrics,Pediatric)   Skin Integrity making progress toward outcome         Problem: Mechanical Ventilation, Invasive (Adult)  Goal: Signs and Symptoms of Listed Potential Problems Will be Absent or Manageable (Mechanical Ventilation, Invasive)  Outcome: Ongoing (interventions implemented as appropriate)    12/16/17 0845   Mechanical Ventilation, Invasive   Problems Assessed (Mechanical Ventilation, Invasive) all   Problems Present (Mechanical Ventilation, Invasive) none

## 2017-12-16 NOTE — PROGRESS NOTES
Progress Note   12/16/17      Subjective     Interval History:     Complaints: Resting on vent.  No distress. UoP noted 4100        Objective     Intake & Output (last day)       12/15 0701 - 12/16 0700 12/16 0701 - 12/17 0700    P.O.      I.V. (mL/kg) 200 (2.2)     Total Intake(mL/kg) 200 (2.2)     Urine (mL/kg/hr) 4100 (1.9)     Total Output 4100      Net -3900                  Vital Signs  Temp:  [94.9 °F (34.9 °C)-98.8 °F (37.1 °C)] 98.4 °F (36.9 °C)  Heart Rate:  [48-85] 62  Resp:  [20-22] 22  BP: ()/() 102/62  FiO2 (%):  [40 %-60 %] 60 %    Physical Exam:   General Sedated on vent   Lungs dec bases with scattered rhonchi   Heart regular rhythm & normal rate   Abdomen +BS   Extremities no edema    Labs:  Lab Results (last 72 hours)     Procedure Component Value Units Date/Time    Troponin [[id number]]  (Abnormal) Collected:  12/13/17 1201    Specimen:  Blood Updated:  12/13/17 1300     Troponin I 0.519 (H) ng/mL     Narrative:       Ultra Troponin I Reference Range:         <=0.039 ng/mL: Negative    0.04-0.779 ng/mL: Indeterminate Range. Suspicious of MI.  Clinical correlation required.       >=0.78  ng/mL: Consistent with myocardial injury.  Clinical correlation required.    Troponin [[id number]]  (Abnormal) Collected:  12/13/17 1655    Specimen:  Blood Updated:  12/13/17 1756     Troponin I 0.530 (H) ng/mL     Narrative:       Ultra Troponin I Reference Range:         <=0.039 ng/mL: Negative    0.04-0.779 ng/mL: Indeterminate Range. Suspicious of MI.  Clinical correlation required.       >=0.78  ng/mL: Consistent with myocardial injury.  Clinical correlation required.    CBC Auto Differential [[id number]]  (Abnormal) Collected:  12/14/17 0029    Specimen:  Blood Updated:  12/14/17 0116     WBC 9.80 10*3/mm3      RBC 4.47 (L) 10*6/mm3      Hemoglobin 14.4 g/dL      Hematocrit 43.5 %      MCV 97.3 (H) fL      MCH 32.2 pg      MCHC 33.1 g/dL      RDW 13.3 %      RDW-SD 45.3 fl      MPV 10.2 (H) fL       Platelets 144 10*3/mm3      Neutrophil % 83.8 (H) %      Lymphocyte % 9.2 (L) %      Monocyte % 6.8 %      Eosinophil % 0.0 %      Basophil % 0.1 %      Immature Grans % 0.1 %      Neutrophils, Absolute 8.21 (H) 10*3/mm3      Lymphocytes, Absolute 0.90 (L) 10*3/mm3      Monocytes, Absolute 0.67 10*3/mm3      Eosinophils, Absolute 0.00 10*3/mm3      Basophils, Absolute 0.01 10*3/mm3      Immature Grans, Absolute 0.01 10*3/mm3     Basic Metabolic Panel [[id number]]  (Abnormal) Collected:  12/14/17 0029    Specimen:  Blood Updated:  12/14/17 0123     Glucose 122 (H) mg/dL      BUN 21 mg/dL      Creatinine 1.05 mg/dL      Sodium 136 mmol/L      Potassium 4.4 mmol/L      Chloride 104 mmol/L      CO2 23.7 (L) mmol/L      Calcium 9.3 mg/dL      eGFR Non African Amer 70 mL/min/1.73      BUN/Creatinine Ratio 20.0     Anion Gap 8.3 mmol/L     Narrative:       GFR Normal >60  Chronic Kidney Disease <60  Kidney Failure <15    Osmolality, Calculated [[id number]]  (Normal) Collected:  12/14/17 0029    Specimen:  Blood Updated:  12/14/17 0123     Osmolality Calc 276.2 mOsm/kg     Troponin [[id number]]  (Abnormal) Collected:  12/14/17 0029    Specimen:  Blood Updated:  12/14/17 0135     Troponin I 0.387 (H) ng/mL     Narrative:       Ultra Troponin I Reference Range:         <=0.039 ng/mL: Negative    0.04-0.779 ng/mL: Indeterminate Range. Suspicious of MI.  Clinical correlation required.       >=0.78  ng/mL: Consistent with myocardial injury.  Clinical correlation required.    Troponin [[id number]]  (Abnormal) Collected:  12/14/17 0448    Specimen:  Blood Updated:  12/14/17 0548     Troponin I 0.330 (H) ng/mL     Narrative:       Ultra Troponin I Reference Range:         <=0.039 ng/mL: Negative    0.04-0.779 ng/mL: Indeterminate Range. Suspicious of MI.  Clinical correlation required.       >=0.78  ng/mL: Consistent with myocardial injury.  Clinical correlation required.    Troponin [[id number]]  (Abnormal) Collected:  12/14/17  1538    Specimen:  Blood Updated:  12/14/17 1640     Troponin I 0.224 (H) ng/mL     Narrative:       Ultra Troponin I Reference Range:         <=0.039 ng/mL: Negative    0.04-0.779 ng/mL: Indeterminate Range. Suspicious of MI.  Clinical correlation required.       >=0.78  ng/mL: Consistent with myocardial injury.  Clinical correlation required.    Troponin [[id number]]  (Abnormal) Collected:  12/14/17 1944    Specimen:  Blood Updated:  12/14/17 2036     Troponin I 0.173 (H) ng/mL     Narrative:       Ultra Troponin I Reference Range:         <=0.039 ng/mL: Negative    0.04-0.779 ng/mL: Indeterminate Range. Suspicious of MI.  Clinical correlation required.       >=0.78  ng/mL: Consistent with myocardial injury.  Clinical correlation required.    CBC & Differential [[id number]] Collected:  12/15/17 0046    Specimen:  Blood Updated:  12/15/17 0252    Narrative:       The following orders were created for panel order CBC & Differential.  Procedure                               Abnormality         Status                     ---------                               -----------         ------                     CBC Auto Differential[[id number]]        Abnormal            Final result                 Please view results for these tests on the individual orders.    CBC Auto Differential [[id number]]  (Abnormal) Collected:  12/15/17 0046    Specimen:  Blood Updated:  12/15/17 0252     WBC 12.10 10*3/mm3      RBC 4.58 (L) 10*6/mm3      Hemoglobin 14.6 g/dL      Hematocrit 45.2 %      MCV 98.7 (H) fL      MCH 31.9 pg      MCHC 32.3 (L) g/dL      RDW 13.4 %      RDW-SD 46.9 fl      MPV 11.4 (H) fL      Platelets 134 10*3/mm3      Neutrophil % 83.9 (H) %      Lymphocyte % 7.1 (L) %      Monocyte % 6.9 %      Eosinophil % 1.7 %      Basophil % 0.1 %      Immature Grans % 0.3 %      Neutrophils, Absolute 10.16 (H) 10*3/mm3      Lymphocytes, Absolute 0.86 (L) 10*3/mm3      Monocytes, Absolute 0.83 10*3/mm3      Eosinophils, Absolute  0.20 10*3/mm3      Basophils, Absolute 0.01 10*3/mm3      Immature Grans, Absolute 0.04 (H) 10*3/mm3     POC Glucose Once [[id number]]  (Abnormal) Collected:  12/15/17 0308    Specimen:  Blood Updated:  12/15/17 0314     Glucose 138 (H) mg/dL     Narrative:       Meter: [id number] : [id number] Makenzie Alejandre    C-reactive Protein [[id number]]  (Normal) Collected:  12/15/17 0046    Specimen:  Blood Updated:  12/15/17 0322     C-Reactive Protein <0.50 mg/dL     Troponin [[id number]]  (Abnormal) Collected:  12/15/17 0046    Specimen:  Blood Updated:  12/15/17 0328     Troponin I 0.178 (H) ng/mL     Narrative:       Ultra Troponin I Reference Range:         <=0.039 ng/mL: Negative    0.04-0.779 ng/mL: Indeterminate Range. Suspicious of MI.  Clinical correlation required.       >=0.78  ng/mL: Consistent with myocardial injury.  Clinical correlation required.    Myoglobin, Serum [[id number]]  (Normal) Collected:  12/15/17 0046    Specimen:  Blood Updated:  12/15/17 0331     Myoglobin 88.0 ng/mL     Basic Metabolic Panel [[id number]]  (Abnormal) Collected:  12/15/17 0046    Specimen:  Blood Updated:  12/15/17 0334     Glucose 108 mg/dL      BUN 23 (H) mg/dL      Creatinine 0.98 mg/dL      Sodium 137 mmol/L      Potassium 5.5 (H) mmol/L       1+ Hemolysis         Chloride 107 mmol/L      CO2 22.8 (L) mmol/L      Calcium 9.4 mg/dL      eGFR Non African Amer 76 mL/min/1.73      BUN/Creatinine Ratio 23.5     Anion Gap 7.2 mmol/L     Narrative:       GFR Normal >60  Chronic Kidney Disease <60  Kidney Failure <15    CK [[id number]]  (Normal) Collected:  12/15/17 0046    Specimen:  Blood Updated:  12/15/17 0334     Creatine Kinase 135 U/L       1+ Hemolysis        Osmolality, Calculated [[id number]]  (Normal) Collected:  12/15/17 0046    Specimen:  Blood Updated:  12/15/17 0334     Osmolality Calc 278.0 mOsm/kg     CK-MB Index [[id number]]  (Abnormal) Collected:  12/15/17 0046    Specimen:  Blood Updated:  12/15/17 0335     CK-MB Index  5.9 (H) %     CK-MB [[id number]]  (Abnormal) Collected:  12/15/17 0046    Specimen:  Blood Updated:  12/15/17 0337     CKMB 7.96 (C) ng/mL     Blood Gas, Arterial [[id number]]  (Abnormal) Collected:  12/15/17 0334    Specimen:  Arterial Blood Updated:  12/15/17 0344     Site Arterial: right radial     Ten's Test Positive     pH, Arterial 7.287 (C) pH units      pCO2, Arterial 50.8 (C) mm Hg      pO2, Arterial 86.3 mm Hg      HCO3, Arterial 23.7 mmol/L      Base Excess, Arterial -3.6 mmol/L      O2 Saturation, Arterial 95.7 %      Hemoglobin, Blood Gas 16.6 (H) g/dL      Hematocrit, Blood Gas 49.0 %      Oxyhemoglobin 94.6 %      Methemoglobin 0.20 %      Carboxyhemoglobin 0.9 %      A-a Gradiant 69.0 mmHg      Temperature 98.6 C      Barometric Pressure for Blood Gas 718 mmHg      Modality Cannula - Nasal     FIO2 32 %     Troponin [[id number]]  (Abnormal) Collected:  12/15/17 0320    Specimen:  Blood Updated:  12/15/17 0404     Troponin I 0.178 (H) ng/mL     Narrative:       Ultra Troponin I Reference Range:         <=0.039 ng/mL: Negative    0.04-0.779 ng/mL: Indeterminate Range. Suspicious of MI.  Clinical correlation required.       >=0.78  ng/mL: Consistent with myocardial injury.  Clinical correlation required.    Troponin [[id number]]  (Abnormal) Collected:  12/15/17 0510    Specimen:  Blood Updated:  12/15/17 0626     Troponin I 0.139 (H) ng/mL     Narrative:       Ultra Troponin I Reference Range:         <=0.039 ng/mL: Negative    0.04-0.779 ng/mL: Indeterminate Range. Suspicious of MI.  Clinical correlation required.       >=0.78  ng/mL: Consistent with myocardial injury.  Clinical correlation required.    Blood Gas, Arterial [[id number]]  (Abnormal) Collected:  12/15/17 0649    Specimen:  Arterial Blood Updated:  12/15/17 0706     Site Arterial: right radial     Ten's Test Positive     pH, Arterial 7.228 (C) pH units      pCO2, Arterial 64.4 (C) mm Hg      pO2, Arterial 167.4 (H) mm Hg      HCO3, Arterial  26.2 (H) mmol/L      Base Excess, Arterial -3.1 mmol/L      O2 Saturation, Arterial 99.0 %      Hemoglobin, Blood Gas 17.3 (H) g/dL      Hematocrit, Blood Gas 51.0 %      Oxyhemoglobin 98.0 %      Methemoglobin 0.40 %      Carboxyhemoglobin 0.6 %      A-a Gradiant 97.7 mmHg      Temperature 98.6 C      Barometric Pressure for Blood Gas 722 mmHg      Modality BiPap     FIO2 50 %      Set Mech Resp Rate 10     IPAP 12     EPAP 6    Blood Gas, Arterial [[id number]]  (Abnormal) Collected:  12/15/17 1051    Specimen:  Arterial Blood Updated:  12/15/17 1111     Site Arterial: right radial     Ten's Test Positive     pH, Arterial 7.320 (L) pH units      pCO2, Arterial 54.2 (C) mm Hg      pO2, Arterial 72.8 (L) mm Hg      HCO3, Arterial 27.3 (H) mmol/L      Base Excess, Arterial 0.1 mmol/L      O2 Saturation, Arterial 94.2 %      Hemoglobin, Blood Gas 15.7 g/dL      Hematocrit, Blood Gas 46.0 %      Oxyhemoglobin 93.0 %      Methemoglobin 0.30 %      Carboxyhemoglobin 1.0 %      A-a Gradiant 201.9 mmHg      Temperature 98.9 C      Barometric Pressure for Blood Gas 722 mmHg      Modality Adult Vent     FIO2 50 %      Ventilator Mode AC     Set Tidal Volume 450     Set Mech Resp Rate 22     PEEP 5    Troponin [[id number]]  (Abnormal) Collected:  12/15/17 1151    Specimen:  Blood Updated:  12/15/17 1304     Troponin I 0.591 (H) ng/mL     Narrative:       Ultra Troponin I Reference Range:         <=0.039 ng/mL: Negative    0.04-0.779 ng/mL: Indeterminate Range. Suspicious of MI.  Clinical correlation required.       >=0.78  ng/mL: Consistent with myocardial injury.  Clinical correlation required.    Troponin [[id number]]  (Abnormal) Collected:  12/15/17 1738    Specimen:  Blood Updated:  12/15/17 1835     Troponin I 0.568 (H) ng/mL     Narrative:       Ultra Troponin I Reference Range:         <=0.039 ng/mL: Negative    0.04-0.779 ng/mL: Indeterminate Range. Suspicious of MI.  Clinical correlation required.       >=0.78  ng/mL:  Consistent with myocardial injury.  Clinical correlation required.    Troponin [[id number]]  (Abnormal) Collected:  12/16/17 0052    Specimen:  Blood Updated:  12/16/17 0153     Troponin I 0.583 (H) ng/mL     Narrative:       Ultra Troponin I Reference Range:         <=0.039 ng/mL: Negative    0.04-0.779 ng/mL: Indeterminate Range. Suspicious of MI.  Clinical correlation required.       >=0.78  ng/mL: Consistent with myocardial injury.  Clinical correlation required.    Blood Culture - Blood, [[id number]]  (Normal) Collected:  12/13/17 0313    Specimen:  Blood from Arm, Left Updated:  12/16/17 0331     Blood Culture No growth at 3 days    Blood Culture - Blood, [[id number]]  (Normal) Collected:  12/13/17 0348    Specimen:  Blood from Arm, Left Updated:  12/16/17 0401     Blood Culture No growth at 3 days    Blood Gas, Arterial [[id number]]  (Abnormal) Collected:  12/16/17 0453    Specimen:  Arterial Blood Updated:  12/16/17 0510     Site Arterial: right radial     Ten's Test N/A     pH, Arterial 7.403 pH units      pCO2, Arterial 44.3 mm Hg      pO2, Arterial 62.5 (L) mm Hg      HCO3, Arterial 27.0 (H) mmol/L      Base Excess, Arterial 1.8 mmol/L      O2 Saturation, Arterial 91.2 %      Hemoglobin, Blood Gas 15.6 g/dL      Hematocrit, Blood Gas 46.0 %      Oxyhemoglobin 89.7 %      Methemoglobin 0.40 %      Carboxyhemoglobin 1.2 %      A-a Gradiant 225.2 mmHg      Temperature 98.6 C      Barometric Pressure for Blood Gas 722 mmHg      Modality Adult Vent     FIO2 50 %      Ventilator Mode AC/VC     Set Tidal Volume 450     Set [facility] Resp Rate 22     PEEP 5    CBC & Differential [[id number]] Collected:  12/16/17 0544    Specimen:  Blood Updated:  12/16/17 0627    Narrative:       The following orders were created for panel order CBC & Differential.  Procedure                               Abnormality         Status                     ---------                               -----------         ------                      CBC Auto Differential[[id number]]        Abnormal            Final result                 Please view results for these tests on the individual orders.    CBC Auto Differential [[id number]]  (Abnormal) Collected:  12/16/17 0544    Specimen:  Blood Updated:  12/16/17 0627     WBC 12.33 10*3/mm3      RBC 4.64 (L) 10*6/mm3      Hemoglobin 14.8 g/dL      Hematocrit 45.4 %      MCV 97.8 (H) fL      MCH 31.9 pg      MCHC 32.6 (L) g/dL      RDW 13.4 %      RDW-SD 46.8 fl      MPV 10.4 (H) fL      Platelets 167 10*3/mm3      Neutrophil % 86.3 (H) %      Lymphocyte % 6.7 (L) %      Monocyte % 6.8 %      Eosinophil % 0.0 %      Basophil % 0.0 %      Immature Grans % 0.2 %      Neutrophils, Absolute 10.64 (H) 10*3/mm3      Lymphocytes, Absolute 0.83 (L) 10*3/mm3      Monocytes, Absolute 0.84 10*3/mm3      Eosinophils, Absolute 0.00 10*3/mm3      Basophils, Absolute 0.00 10*3/mm3      Immature Grans, Absolute 0.02 10*3/mm3     Comprehensive Metabolic Panel [[id number]]  (Abnormal) Collected:  12/16/17 0544    Specimen:  Blood Updated:  12/16/17 0647     Glucose 102 mg/dL      BUN 32 (H) mg/dL      Creatinine 1.21 mg/dL      Sodium 141 mmol/L      Potassium 4.3 mmol/L      Chloride 103 mmol/L      CO2 30.3 mmol/L      Calcium 8.7 mg/dL      Total Protein 6.3 g/dL      Albumin 3.80 g/dL      ALT (SGPT) 115 (H) U/L      AST (SGOT) 40 (H) U/L      Alkaline Phosphatase 57 U/L       Note New Reference Ranges        Total Bilirubin 0.4 mg/dL      eGFR Non African Amer 59 (L) mL/min/1.73      Globulin 2.5 gm/dL      A/G Ratio 1.5 g/dL      BUN/Creatinine Ratio 26.4 (H)     Anion Gap 7.7 mmol/L     Osmolality, Calculated [[id number]]  (Normal) Collected:  12/16/17 0544    Specimen:  Blood Updated:  12/16/17 0647     Osmolality Calc 288.4 mOsm/kg     C-reactive Protein [[id number]]  (Normal) Collected:  12/16/17 0544    Specimen:  Blood Updated:  12/16/17 0650     C-Reactive Protein <0.50 mg/dL     Troponin [[id number]]  (Abnormal)  Collected:  12/16/17 0544    Specimen:  Blood Updated:  12/16/17 0700     Troponin I 0.506 (H) ng/mL     Narrative:       Ultra Troponin I Reference Range:         <=0.039 ng/mL: Negative    0.04-0.779 ng/mL: Indeterminate Range. Suspicious of MI.  Clinical correlation required.       >=0.78  ng/mL: Consistent with myocardial injury.  Clinical correlation required.          Xray:  Imaging Results (last 24 hours)     Procedure Component Value Units Date/Time    XR Chest 1 View [[id number]] Collected:  12/15/17 0842     Updated:  12/15/17 0846    Narrative:       XR CHEST 1 VW-     CLINICAL INDICATION: sob; R74.8-Abnormal levels of other serum enzymes;  J44.1-Chronic obstructive pulmonary disease with (acute) exacerbation          COMPARISON: 12/13/2017      TECHNIQUE: Single frontal view of the chest.     FINDINGS:     There is no focal alveolar infiltrate or effusion.  The cardiac silhouette is normal. The pulmonary vasculature is  unremarkable.  There is no evidence of an acute osseous abnormality.   There are no suspicious-appearing parenchymal soft tissue nodules.            Impression:       No evidence of active or acute cardiopulmonary disease on today's chest  radiograph.         This report was finalized on 12/15/2017 8:44 AM by Dr. Jean-Claude Bermeo MD.       XR Chest 1 View [[id number]] Collected:  12/15/17 1040     Updated:  12/15/17 1056    Narrative:       XR CHEST 1 VIEW-     CLINICAL INDICATION: ETT and OG placement; R74.8-Abnormal levels of  other serum enzymes; J44.1-Chronic obstructive pulmonary disease with  (acute) exacerbation.          COMPARISON: 12/15/2017.      TECHNIQUE: Single frontal view of the chest.     FINDINGS:     Endotracheal tube overlies the tracheal air column.  Nasogastric tube is in the stomach.  The cardiac silhouette is normal. The pulmonary vasculature is  unremarkable.  There is no evidence of an acute osseous abnormality.   There are no suspicious-appearing parenchymal soft  tissue nodules.            Impression:       Line placement as above.         This report was finalized on 12/15/2017 10:54 AM by Dr. Jean-Claude Bermeo MD.                Results Review:     I reviewed the patient's new clinical results.    Medication Review:   Hospital Medications (active)       Dose Frequency Start End    albuterol (PROVENTIL) nebulizer solution 0.083% 2.5 mg/3mL 2.5 mg Every 6 Hours PRN 12/13/2017     Sig - Route: Take 2.5 mg by nebulization Every 6 (Six) Hours As Needed for Wheezing. - Nebulization    Notes to Pharmacy: Prior to [facility] Admission, Patient was on: 2 puffs every 4 to 6 hours as needed    aspirin EC tablet 81 mg 81 mg Daily 12/14/2017     Sig - Route: Take 1 tablet by mouth Daily. - Oral    diltiaZEM (CARDIZEM) 100 mg/100 mL (1 mg/mL) NS infusion (ADV) 10 mg/hr Titrated 12/15/2017     Sig - Route: Infuse 10 mg/hr into a venous catheter Dose Adjusted By Provider As Needed. - Intravenous    DOPamine 400 mg/250 mL (1.6 mg/mL) infusion 2-20 mcg/kg/min × 88.9 kg Titrated 12/15/2017     Sig - Route: Infuse 177.8-1,778 mcg/min into a venous catheter Dose Adjusted By Provider As Needed. - Intravenous    enoxaparin (LOVENOX) syringe 40 mg 40 mg Every 24 Hours 12/13/2017     Sig - Route: Inject 0.4 mL under the skin Daily. - Subcutaneous    FENTANYL PCA 1500 MCG/30 ML (BHCOR) PCA  Continuous 12/15/2017 12/25/2017    Sig - Route: Infuse  into a venous catheter Continuous. - Intravenous    furosemide (LASIX) tablet 20 mg 20 mg Daily 12/13/2017     Sig - Route: Take 1 tablet by mouth Daily. - Oral    ipratropium-albuterol (DUO-NEB) nebulizer solution 3 mL 3 mL Every 4 Hours PRN 12/15/2017     Sig - Route: Take 3 mL by nebulization Every 4 (Four) Hours As Needed for Wheezing or Shortness of Air. - Nebulization    ipratropium-albuterol (DUO-NEB) nebulizer solution 3 mL 3 mL 4 Times Daily - RT 12/15/2017     Sig - Route: Take 3 mL by nebulization 4 (Four) Times a Day. - Nebulization    lisinopril  "(PRINIVIL,ZESTRIL) tablet 20 mg 20 mg Daily 12/14/2017     Sig - Route: Take 2 tablets by mouth Daily. - Oral    methylPREDNISolone sodium succinate (SOLU-Medrol) injection 40 mg 40 mg Every 12 Hours 12/15/2017     Sig - Route: Infuse 1 mL into a venous catheter Every 12 (Twelve) Hours. - Intravenous    metoprolol succinate XL (TOPROL-XL) 24 hr tablet 50 mg 50 mg Daily 12/13/2017     Sig - Route: Take 1 tablet by mouth Daily. - Oral    metoprolol tartrate (LOPRESSOR) injection 5 mg 5 mg Every 4 Hours PRN 12/15/2017     Sig - Route: Infuse 5 mL into a venous catheter Every 4 (Four) Hours As Needed (For SBP > 160 and HR > 60). - Intravenous    midazolam (VERSED) 2 MG/2ML injection  - ADS Override Pull   12/15/2017 12/15/2017    Notes to Pharmacy: Created by cabinet override    norepinephrine (LEVOPHED) 8,000 mcg in sodium chloride 0.9 % 250 mL (32 mcg/mL) infusion 0.02-0.3 mcg/kg/min × 88.9 kg Titrated 12/15/2017     Sig - Route: Infuse 1.778-26.67 mcg/min into a venous catheter Dose Adjusted By Provider As Needed. - Intravenous    propofol (DIPRIVAN) infusion 10 mg/mL 100 mL 5-50 mcg/kg/min × 88.9 kg Titrated 12/15/2017     Sig - Route: Infuse 444.5-4,445 mcg/min into a venous catheter Dose Adjusted By Provider As Needed. - Intravenous    sodium chloride 0.9 % flush 1-10 mL 1-10 mL As Needed 12/13/2017     Sig - Route: Infuse 1-10 mL into a venous catheter As Needed for Line Care. - Intravenous    sodium chloride 0.9 % flush 10 mL 10 mL As Needed 12/13/2017     Sig - Route: Infuse 10 mL into a venous catheter As Needed for Line Care. - Intravenous    Cosign for Ordering: Accepted by Kulwinder Cho MD on 12/13/2017  4:44 AM    Linked Group 1:  \"And\" Linked Group Details        sodium chloride 0.9 % flush 10 mL 10 mL Every 12 Hours Scheduled 12/15/2017     Sig - Route: 10 mL by Intracatheter route Every 12 (Twelve) Hours. - Intracatheter    Cosign for Ordering: Required by Nelson Ash MD    sodium chloride 0.9 " % flush 10 mL 10 mL As Needed 12/15/2017     Sig - Route: 10 mL by Intracatheter route As Needed for Line Care (After Medication Administration). - Intracatheter    Cosign for Ordering: Required by Nelson Ash MD    spironolactone (ALDACTONE) tablet 25 mg 25 mg Daily 12/13/2017     Sig - Route: Take 1 tablet by mouth Daily. - Oral    vecuronium (NORCURON) injection 10 mg 10 mg Once 12/15/2017     Sig - Route: Infuse 10 mg into a venous catheter 1 (One) Time. - Intravenous    methylPREDNISolone sodium succinate (SOLU-Medrol) injection 60 mg (Discontinued) 60 mg Every 6 Hours Scheduled 12/13/2017 12/15/2017    Sig - Route: Infuse 0.96 mL into a venous catheter Every 6 (Six) Hours. - Intravenous          Assessment/Plan    1.Respiratory Failure: ?volume overload with underlying lung dz.  Pt diuresised 4100.  Resting on vent with sedation will continue current settings today.  ABG noted.  2.Elevated Cardiac Enzymes/Cardiac arrhythmia: NSR rate 60's currently.  Trop 0.5, stress test neg for areas of ischemia.  Cardiology following appreciate input.  Continue diuresis.  3.Diastolic CHF: likely cause of #1 with volume overload.  4.Hypokalemia: resolved with tx      Tab Ash MD  12/16/17  7:50 AM

## 2017-12-16 NOTE — PLAN OF CARE
Problem: Mechanical Ventilation, Invasive (Adult)  Goal: Signs and Symptoms of Listed Potential Problems Will be Absent or Manageable (Mechanical Ventilation, Invasive)  Outcome: Ongoing (interventions implemented as appropriate)    12/16/17 7299   Mechanical Ventilation, Invasive   Problems Assessed (Mechanical Ventilation, Invasive) all

## 2017-12-17 LAB
A-A DO2: 283.3 MMHG (ref 0–300)
ALBUMIN SERPL-MCNC: 3.7 G/DL (ref 3.4–4.8)
ALBUMIN/GLOB SERPL: 1.4 G/DL (ref 1.5–2.5)
ALP SERPL-CCNC: 54 U/L (ref 40–129)
ALT SERPL W P-5'-P-CCNC: 90 U/L (ref 10–44)
ANION GAP SERPL CALCULATED.3IONS-SCNC: 8 MMOL/L (ref 3.6–11.2)
ARTERIAL PATENCY WRIST A: POSITIVE
AST SERPL-CCNC: 27 U/L (ref 10–34)
ATMOSPHERIC PRESS: 727 MMHG
BASE EXCESS BLDA CALC-SCNC: 2.7 MMOL/L
BASOPHILS # BLD AUTO: 0 10*3/MM3 (ref 0–0.3)
BASOPHILS NFR BLD AUTO: 0 % (ref 0–2)
BDY SITE: ABNORMAL
BILIRUB SERPL-MCNC: 0.3 MG/DL (ref 0.2–1.8)
BODY TEMPERATURE: 98.6 C
BUN BLD-MCNC: 35 MG/DL (ref 7–21)
BUN/CREAT SERPL: 30.4 (ref 7–25)
CALCIUM SPEC-SCNC: 8.6 MG/DL (ref 7.7–10)
CHLORIDE SERPL-SCNC: 104 MMOL/L (ref 99–112)
CO2 SERPL-SCNC: 27 MMOL/L (ref 24.3–31.9)
COHGB MFR BLD: 0.7 % (ref 0–5)
CREAT BLD-MCNC: 1.15 MG/DL (ref 0.43–1.29)
DEPRECATED RDW RBC AUTO: 47.4 FL (ref 37–54)
EOSINOPHIL # BLD AUTO: 0 10*3/MM3 (ref 0–0.7)
EOSINOPHIL NFR BLD AUTO: 0 % (ref 0–7)
ERYTHROCYTE [DISTWIDTH] IN BLOOD BY AUTOMATED COUNT: 13.2 % (ref 11.5–14.5)
GFR SERPL CREATININE-BSD FRML MDRD: 63 ML/MIN/1.73
GLOBULIN UR ELPH-MCNC: 2.7 GM/DL
GLUCOSE BLD-MCNC: 110 MG/DL (ref 70–110)
HCO3 BLDA-SCNC: 29.1 MMOL/L (ref 22–26)
HCT VFR BLD AUTO: 45.6 % (ref 42–52)
HCT VFR BLD CALC: 50 % (ref 42–52)
HGB BLD-MCNC: 15.2 G/DL (ref 14–18)
HGB BLDA-MCNC: 16.9 G/DL (ref 12–16)
HOROWITZ INDEX BLD+IHG-RTO: 60 %
IMM GRANULOCYTES # BLD: 0.01 10*3/MM3 (ref 0–0.03)
IMM GRANULOCYTES NFR BLD: 0.1 % (ref 0–0.5)
LYMPHOCYTES # BLD AUTO: 0.58 10*3/MM3 (ref 1–3)
LYMPHOCYTES NFR BLD AUTO: 5.8 % (ref 16–46)
MCH RBC QN AUTO: 33.3 PG (ref 27–33)
MCHC RBC AUTO-ENTMCNC: 33.3 G/DL (ref 33–37)
MCV RBC AUTO: 100 FL (ref 80–94)
METHGB BLD QL: 0.4 % (ref 0–3)
MODALITY: ABNORMAL
MONOCYTES # BLD AUTO: 0.49 10*3/MM3 (ref 0.1–0.9)
MONOCYTES NFR BLD AUTO: 4.9 % (ref 0–12)
NEUTROPHILS # BLD AUTO: 8.99 10*3/MM3 (ref 1.4–6.5)
NEUTROPHILS NFR BLD AUTO: 89.2 % (ref 40–75)
OSMOLALITY SERPL CALC.SUM OF ELEC: 286.2 MOSM/KG (ref 273–305)
OXYHGB MFR BLDV: 92 % (ref 85–100)
PCO2 BLDA: 50.6 MM HG (ref 35–45)
PEEP RESPIRATORY: 5 CM[H2O]
PH BLDA: 7.38 PH UNITS (ref 7.35–7.45)
PLATELET # BLD AUTO: 154 10*3/MM3 (ref 130–400)
PMV BLD AUTO: 10.2 FL (ref 6–10)
PO2 BLDA: 69 MM HG (ref 80–100)
POTASSIUM BLD-SCNC: 4.4 MMOL/L (ref 3.5–5.3)
PROT SERPL-MCNC: 6.4 G/DL (ref 6–8)
RBC # BLD AUTO: 4.56 10*6/MM3 (ref 4.7–6.1)
SAO2 % BLDCOA: 93 % (ref 90–100)
SET MECH RESP RATE: 22
SODIUM BLD-SCNC: 139 MMOL/L (ref 135–153)
TROPONIN I SERPL-MCNC: 0.24 NG/ML
TROPONIN I SERPL-MCNC: 0.25 NG/ML
TROPONIN I SERPL-MCNC: 0.25 NG/ML
TROPONIN I SERPL-MCNC: 0.29 NG/ML
VENTILATOR MODE: AC
VT ON VENT VENT: 450 ML
WBC NRBC COR # BLD: 10.07 10*3/MM3 (ref 4.5–12.5)

## 2017-12-17 PROCEDURE — 94799 UNLISTED PULMONARY SVC/PX: CPT

## 2017-12-17 PROCEDURE — 85025 COMPLETE CBC W/AUTO DIFF WBC: CPT | Performed by: FAMILY MEDICINE

## 2017-12-17 PROCEDURE — 36600 WITHDRAWAL OF ARTERIAL BLOOD: CPT | Performed by: FAMILY MEDICINE

## 2017-12-17 PROCEDURE — 25010000002 ENOXAPARIN PER 10 MG: Performed by: FAMILY MEDICINE

## 2017-12-17 PROCEDURE — 25010000002 METHYLPREDNISOLONE PER 40 MG: Performed by: NURSE PRACTITIONER

## 2017-12-17 PROCEDURE — 82805 BLOOD GASES W/O2 SATURATION: CPT | Performed by: FAMILY MEDICINE

## 2017-12-17 PROCEDURE — 82375 ASSAY CARBOXYHB QUANT: CPT | Performed by: FAMILY MEDICINE

## 2017-12-17 PROCEDURE — 25010000002 FUROSEMIDE PER 20 MG: Performed by: INTERNAL MEDICINE

## 2017-12-17 PROCEDURE — 80053 COMPREHEN METABOLIC PANEL: CPT | Performed by: FAMILY MEDICINE

## 2017-12-17 PROCEDURE — 83050 HGB METHEMOGLOBIN QUAN: CPT | Performed by: FAMILY MEDICINE

## 2017-12-17 PROCEDURE — 84484 ASSAY OF TROPONIN QUANT: CPT | Performed by: FAMILY MEDICINE

## 2017-12-17 PROCEDURE — 25010000002 PROPOFOL 1000 MG/ML EMULSION: Performed by: INTERNAL MEDICINE

## 2017-12-17 PROCEDURE — 94003 VENT MGMT INPAT SUBQ DAY: CPT

## 2017-12-17 RX ADMIN — PROPOFOL 45 MCG/KG/MIN: 10 INJECTION, EMULSION INTRAVENOUS at 19:00

## 2017-12-17 RX ADMIN — Medication 10 ML: at 08:30

## 2017-12-17 RX ADMIN — PROPOFOL 45 MCG/KG/MIN: 10 INJECTION, EMULSION INTRAVENOUS at 10:58

## 2017-12-17 RX ADMIN — METHYLPREDNISOLONE SODIUM SUCCINATE 40 MG: 125 INJECTION, POWDER, FOR SOLUTION INTRAMUSCULAR; INTRAVENOUS at 05:11

## 2017-12-17 RX ADMIN — IPRATROPIUM BROMIDE AND ALBUTEROL SULFATE 3 ML: .5; 3 SOLUTION RESPIRATORY (INHALATION) at 18:22

## 2017-12-17 RX ADMIN — PROPOFOL 45 MCG/KG/MIN: 10 INJECTION, EMULSION INTRAVENOUS at 23:56

## 2017-12-17 RX ADMIN — ASPIRIN 81 MG: 81 TABLET ORAL at 08:28

## 2017-12-17 RX ADMIN — ENOXAPARIN SODIUM 40 MG: 40 INJECTION SUBCUTANEOUS at 08:29

## 2017-12-17 RX ADMIN — IPRATROPIUM BROMIDE AND ALBUTEROL SULFATE 3 ML: .5; 3 SOLUTION RESPIRATORY (INHALATION) at 06:38

## 2017-12-17 RX ADMIN — Medication 10 ML: at 20:27

## 2017-12-17 RX ADMIN — IPRATROPIUM BROMIDE AND ALBUTEROL SULFATE 3 ML: .5; 3 SOLUTION RESPIRATORY (INHALATION) at 12:11

## 2017-12-17 RX ADMIN — FUROSEMIDE 20 MG: 10 INJECTION, SOLUTION INTRAMUSCULAR; INTRAVENOUS at 08:29

## 2017-12-17 RX ADMIN — PROPOFOL 45 MCG/KG/MIN: 10 INJECTION, EMULSION INTRAVENOUS at 02:37

## 2017-12-17 RX ADMIN — PROPOFOL 45 MCG/KG/MIN: 10 INJECTION, EMULSION INTRAVENOUS at 16:00

## 2017-12-17 RX ADMIN — METHYLPREDNISOLONE SODIUM SUCCINATE 40 MG: 125 INJECTION, POWDER, FOR SOLUTION INTRAMUSCULAR; INTRAVENOUS at 18:16

## 2017-12-17 RX ADMIN — FAMOTIDINE 20 MG: 10 INJECTION INTRAVENOUS at 08:30

## 2017-12-17 RX ADMIN — PROPOFOL 45 MCG/KG/MIN: 10 INJECTION, EMULSION INTRAVENOUS at 06:22

## 2017-12-17 RX ADMIN — FAMOTIDINE 20 MG: 10 INJECTION INTRAVENOUS at 20:26

## 2017-12-17 RX ADMIN — IPRATROPIUM BROMIDE AND ALBUTEROL SULFATE 3 ML: .5; 3 SOLUTION RESPIRATORY (INHALATION) at 00:28

## 2017-12-17 NOTE — PLAN OF CARE
Problem: Patient Care Overview (Adult)  Goal: Plan of Care Review  Outcome: Ongoing (interventions implemented as appropriate)    12/17/17 0815   Coping/Psychosocial Response Interventions   Plan Of Care Reviewed With patient   Patient Care Overview   Progress progress toward functional goals as expected       Goal: Discharge Needs Assessment  Outcome: Ongoing (interventions implemented as appropriate)    12/17/17 0815   Discharge Needs Assessment   Discharge Disposition still a patient         Problem: Pain, Acute (Adult)  Goal: Acceptable Pain Control/Comfort Level  Outcome: Ongoing (interventions implemented as appropriate)    12/17/17 0815   Pain, Acute (Adult)   Acceptable Pain Control/Comfort Level making progress toward outcome         Problem: Cardiac Output, Decreased (Adult)  Goal: Adequate Cardiac Output/Effective Tissue Perfusion  Outcome: Ongoing (interventions implemented as appropriate)    12/17/17 0815   Cardiac Output, Decreased (Adult)   Adequate Cardiac Output/Effective Tissue Perfusion making progress toward outcome         Problem: Skin Integrity Impairment, Risk/Actual (Adult)  Goal: Identify Related Risk Factors and Signs and Symptoms  Outcome: Ongoing (interventions implemented as appropriate)    12/17/17 0815   Skin Integrity Impairment, Risk/Actual   Skin Integrity Impairment, Risk/Actual: Related Risk Factors age extremes;cognitive impairment   Signs and Symptoms (Skin Integrity Impairment) edema       Goal: Skin Integrity/Wound Healing  Outcome: Ongoing (interventions implemented as appropriate)    12/17/17 0815   Skin Integrity Impairment, Risk/Actual (Adult)   Skin Integrity/Wound Healing making progress toward outcome         Problem: Respiratory Insufficiency (Adult)  Goal: Identify Related Risk Factors and Signs and Symptoms  Outcome: Ongoing (interventions implemented as appropriate)    12/17/17 0815   Respiratory Insufficiency   Related Risk Factors (Respiratory Insufficiency)  activity intolerance   Signs and Symptoms (Respiratory Insufficiency) abnormal ABGs;abnormal breath sounds       Goal: Acid/Base Balance  Outcome: Ongoing (interventions implemented as appropriate)    12/17/17 0815   Respiratory Insufficiency (Adult)   Acid/Base Balance making progress toward outcome       Goal: Effective Ventilation  Outcome: Ongoing (interventions implemented as appropriate)    12/17/17 0815   Respiratory Insufficiency (Adult)   Effective Ventilation making progress toward outcome         Problem: Pressure Ulcer Risk (Junior Scale) (Adult,Obstetrics,Pediatric)  Goal: Identify Related Risk Factors and Signs and Symptoms  Outcome: Ongoing (interventions implemented as appropriate)    12/17/17 0815   Pressure Ulcer Risk (Junior Scale)   Related Risk Factors (Pressure Ulcer Risk (Junior Scale)) age extremes;critical care admission;cognitive impairment;medical devices       Goal: Skin Integrity  Outcome: Ongoing (interventions implemented as appropriate)    12/17/17 0815   Pressure Ulcer Risk (Junior Scale) (Adult,Obstetrics,Pediatric)   Skin Integrity making progress toward outcome         Problem: Mechanical Ventilation, Invasive (Adult)  Goal: Signs and Symptoms of Listed Potential Problems Will be Absent or Manageable (Mechanical Ventilation, Invasive)  Outcome: Ongoing (interventions implemented as appropriate)    12/17/17 0815   Mechanical Ventilation, Invasive   Problems Assessed (Mechanical Ventilation, Invasive) all   Problems Present (Mechanical Ventilation, Invasive) none

## 2017-12-17 NOTE — PLAN OF CARE
Problem: Mechanical Ventilation, Invasive (Adult)  Goal: Signs and Symptoms of Listed Potential Problems Will be Absent or Manageable (Mechanical Ventilation, Invasive)  Outcome: Ongoing (interventions implemented as appropriate)    12/17/17 1827   Mechanical Ventilation, Invasive   Problems Assessed (Mechanical Ventilation, Invasive) all

## 2017-12-17 NOTE — PROGRESS NOTES
"Reason for follow-up:   troponin I-indeterminate range and    Subjective:     patient is intubated on mechanical ventilator.  He had adequate urinary output.  Telemetry- heart rate in 60s  Blood pressure is stable.  Heartrate drops to 40s  ' last night      Medication Review:     aspirin 81 mg Oral Daily   enoxaparin 40 mg Subcutaneous Q24H   famotidine 20 mg Intravenous Q12H   furosemide 20 mg Intravenous Daily   ipratropium-albuterol 3 mL Nebulization 4x Daily - RT   lisinopril 20 mg Oral Daily   methylPREDNISolone sodium succinate 40 mg Intravenous Q12H   sodium chloride 10 mL Intracatheter Q12H   spironolactone 25 mg Oral Daily   vecuronium 10 mg Intravenous Once         Vital Sign Min/Max for last 24 hours  Temp  Min: 98 °F (36.7 °C)  Max: 98.7 °F (37.1 °C)   BP  Min: 108/64  Max: 166/88   Pulse  Min: 46  Max: 66   Resp  Min: 22  Max: 22   SpO2  Min: 89 %  Max: 96 %   No Data Recorded   No Data Recorded     Flowsheet Rows         First Filed Value    Admission Height  167.6 cm (66\") Documented at 12/13/2017 0250    Admission Weight  88.5 kg (195 lb) Documented at 12/13/2017 0250          Physical Exam:     General Appearance:    Alert, cooperative, in no acute distress   Head:    Normocephalic, without obvious abnormality, atraumatic   Eyes:            Lids and lashes normal, conjunctivae and sclerae normal, no   icterus, no pallor, corneas clear, PERRLA   Ears:    Ears appear intact with no abnormalities noted   Throat:   No oral lesions, no thrush, oral mucosa moist   Neck:   No adenopathy, supple, trachea midline, no thyromegaly, no   carotid bruit, no JVD   Back:     No kyphosis present, no scoliosis present, no skin lesions,      erythema or scars, no tenderness to percussion or                   palpation,   range of motion normal   Lungs:     Clear to auscultation,respirations regular, even and                  unlabored    Heart:    regular rhythm. Heart rate in 60s.  No murmurs   Chest Wall:    No " abnormalities observed   Abdomen:     Normal bowel sounds, no masses, no organomegaly, soft        non-tender, non-distended, no guarding, no rebound                tenderness   Rectal:     Deferred   Extremities:   Moves all extremities well, no edema, no cyanosis, no             redness       Telemetry   normal sinus rhythm.  Heart rate 60-65 bpm        Labs    Results from last 7 days  Lab Units 12/17/17  0019 12/16/17  0544 12/15/17  0046 12/14/17  0029 12/13/17  0313   WBC 10*3/mm3 10.07 12.33 12.10 9.80 12.96*   HEMOGLOBIN g/dL 15.2 14.8 14.6 14.4 16.1   HEMATOCRIT % 45.6 45.4 45.2 43.5 47.8   PLATELETS 10*3/mm3 154 167 134 144 186       Results from last 7 days  Lab Units 12/17/17  0019 12/16/17  0544 12/15/17  0046 12/14/17  0029 12/13/17  0313   SODIUM mmol/L 139 141 137 136 140   POTASSIUM mmol/L 4.4 4.3 5.5* 4.4 4.2   CHLORIDE mmol/L 104 103 107 104 109   CO2 mmol/L 27.0 30.3 22.8* 23.7* 21.7*   BUN mg/dL 35* 32* 23* 21 19   CREATININE mg/dL 1.15 1.21 0.98 1.05 0.97   CALCIUM mg/dL 8.6 8.7 9.4 9.3 8.6   GLUCOSE mg/dL 110 102 108 122* 125*       Results from last 7 days  Lab Units 12/17/17  0019 12/16/17  0544 12/13/17  0313   BILIRUBIN mg/dL 0.3 0.4 0.4   ALK PHOS U/L 54 57 75   AST (SGOT) U/L 27 40* 40*   ALT (SGPT) U/L 90* 115* 41               Results from last 7 days  Lab Units 12/16/17  0544 12/15/17  0046   CRP mg/dL <0.50 <0.50       Results from last 7 days  Lab Units 12/17/17  0516 12/17/17  0019 12/16/17  1824  12/15/17  0046  12/13/17  0517   CK TOTAL U/L  --   --   --   --  135  --  65   TROPONIN I ng/mL 0.246* 0.237* 0.388*  < > 0.178*  < > 0.255*   CK MB INDEX %  --   --   --   --  5.9*  --  5.7*   MYOGLOBIN ng/mL  --   --   --   --  88.0  --   --    < > = values in this interval not displayed.    Results from last 7 days  Lab Units 12/17/17  0728   PH, ARTERIAL pH units 7.377   PO2 ART mm Hg 69.0*   PCO2, ARTERIAL mm Hg 50.6*   HCO3 ART mmol/L 29.1*         Assessment     1.  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  Ef . Clinically nd radiologicallycmpenated.   2. roponin I-indeterminae lilibeth. No evidence of MI.  Nuclear stress test showed no evidene of ishemia.    3. Acute on chronic respiratory ailure dueto exacerbation of COPD    Plan     1. ontinue Lsix 1 mg IV daily   . 2. cotinue lisinopril and spironolactone   3. Metoprolol XL is on hold due to bradycardia   4. Continue low-dose dopamine inusionto keep her heart ate more than50 bpm    .    Mann Romero MD  12/17/17  10:09 AM

## 2017-12-17 NOTE — PROGRESS NOTES
Progress Note   12/17/17      Subjective     Interval History:     Complaints: Sedated on vent.  FiO2 increased yest due to o2 sats running 90's        Objective     Intake & Output (last day)       12/16 0701 - 12/17 0700 12/17 0701 - 12/18 0700    I.V. (mL/kg) 142 (1.6)     Total Intake(mL/kg) 142 (1.6)     Urine (mL/kg/hr) 3330 (1.6)     Total Output 3330      Net -3188                  Vital Signs  Temp:  [98 °F (36.7 °C)-99.4 °F (37.4 °C)] 98 °F (36.7 °C)  Heart Rate:  [46-66] 54  Resp:  [22] 22  BP: (108-166)/(61-88) 143/77  FiO2 (%):  [60 %] 60 %    Physical Exam:   General sedated on vent   Lungs dec bases   Heart regular rhythm & normal rate   Abdomen normal bowel sounds   Extremities no edema    Labs:  Lab Results (last 72 hours)     Procedure Component Value Units Date/Time    Troponin [[id number]]  (Abnormal) Collected:  12/14/17 1538    Specimen:  Blood Updated:  12/14/17 1640     Troponin I 0.224 (H) ng/mL     Narrative:       Ultra Troponin I Reference Range:         <=0.039 ng/mL: Negative    0.04-0.779 ng/mL: Indeterminate Range. Suspicious of MI.  Clinical correlation required.       >=0.78  ng/mL: Consistent with myocardial injury.  Clinical correlation required.    Troponin [[id number]]  (Abnormal) Collected:  12/14/17 1944    Specimen:  Blood Updated:  12/14/17 2036     Troponin I 0.173 (H) ng/mL     Narrative:       Ultra Troponin I Reference Range:         <=0.039 ng/mL: Negative    0.04-0.779 ng/mL: Indeterminate Range. Suspicious of MI.  Clinical correlation required.       >=0.78  ng/mL: Consistent with myocardial injury.  Clinical correlation required.    CBC & Differential [[id number]] Collected:  12/15/17 0046    Specimen:  Blood Updated:  12/15/17 0252    Narrative:       The following orders were created for panel order CBC & Differential.  Procedure                               Abnormality         Status                     ---------                               -----------          ------                     CBC Auto Differential[[id number]]        Abnormal            Final result                 Please view results for these tests on the individual orders.    CBC Auto Differential [[id number]]  (Abnormal) Collected:  12/15/17 0046    Specimen:  Blood Updated:  12/15/17 0252     WBC 12.10 10*3/mm3      RBC 4.58 (L) 10*6/mm3      Hemoglobin 14.6 g/dL      Hematocrit 45.2 %      MCV 98.7 (H) fL      MCH 31.9 pg      MCHC 32.3 (L) g/dL      RDW 13.4 %      RDW-SD 46.9 fl      MPV 11.4 (H) fL      Platelets 134 10*3/mm3      Neutrophil % 83.9 (H) %      Lymphocyte % 7.1 (L) %      Monocyte % 6.9 %      Eosinophil % 1.7 %      Basophil % 0.1 %      Immature Grans % 0.3 %      Neutrophils, Absolute 10.16 (H) 10*3/mm3      Lymphocytes, Absolute 0.86 (L) 10*3/mm3      Monocytes, Absolute 0.83 10*3/mm3      Eosinophils, Absolute 0.20 10*3/mm3      Basophils, Absolute 0.01 10*3/mm3      Immature Grans, Absolute 0.04 (H) 10*3/mm3     POC Glucose Once [[id number]]  (Abnormal) Collected:  12/15/17 0308    Specimen:  Blood Updated:  12/15/17 0314     Glucose 138 (H) mg/dL     Narrative:       Meter: [id number] : [id number] Makenzie Alejandre    C-reactive Protein [[id number]]  (Normal) Collected:  12/15/17 0046    Specimen:  Blood Updated:  12/15/17 0322     C-Reactive Protein <0.50 mg/dL     Troponin [[id number]]  (Abnormal) Collected:  12/15/17 0046    Specimen:  Blood Updated:  12/15/17 0328     Troponin I 0.178 (H) ng/mL     Narrative:       Ultra Troponin I Reference Range:         <=0.039 ng/mL: Negative    0.04-0.779 ng/mL: Indeterminate Range. Suspicious of MI.  Clinical correlation required.       >=0.78  ng/mL: Consistent with myocardial injury.  Clinical correlation required.    Myoglobin, Serum [[id number]]  (Normal) Collected:  12/15/17 0046    Specimen:  Blood Updated:  12/15/17 0331     Myoglobin 88.0 ng/mL     Basic Metabolic Panel [[id number]]  (Abnormal) Collected:  12/15/17 0046    Specimen:  Blood  Updated:  12/15/17 0334     Glucose 108 mg/dL      BUN 23 (H) mg/dL      Creatinine 0.98 mg/dL      Sodium 137 mmol/L      Potassium 5.5 (H) mmol/L       1+ Hemolysis         Chloride 107 mmol/L      CO2 22.8 (L) mmol/L      Calcium 9.4 mg/dL      eGFR Non African Amer 76 mL/min/1.73      BUN/Creatinine Ratio 23.5     Anion Gap 7.2 mmol/L     Narrative:       GFR Normal >60  Chronic Kidney Disease <60  Kidney Failure <15    CK [[id number]]  (Normal) Collected:  12/15/17 0046    Specimen:  Blood Updated:  12/15/17 0334     Creatine Kinase 135 U/L       1+ Hemolysis        Osmolality, Calculated [[id number]]  (Normal) Collected:  12/15/17 0046    Specimen:  Blood Updated:  12/15/17 0334     Osmolality Calc 278.0 mOsm/kg     CK-MB Index [[id number]]  (Abnormal) Collected:  12/15/17 0046    Specimen:  Blood Updated:  12/15/17 0335     CK-MB Index 5.9 (H) %     CK-MB [[id number]]  (Abnormal) Collected:  12/15/17 0046    Specimen:  Blood Updated:  12/15/17 0337     CKMB 7.96 (C) ng/mL     Blood Gas, Arterial [[id number]]  (Abnormal) Collected:  12/15/17 0334    Specimen:  Arterial Blood Updated:  12/15/17 0344     Site Arterial: right radial     Ten's Test Positive     pH, Arterial 7.287 (C) pH units      pCO2, Arterial 50.8 (C) mm Hg      pO2, Arterial 86.3 mm Hg      HCO3, Arterial 23.7 mmol/L      Base Excess, Arterial -3.6 mmol/L      O2 Saturation, Arterial 95.7 %      Hemoglobin, Blood Gas 16.6 (H) g/dL      Hematocrit, Blood Gas 49.0 %      Oxyhemoglobin 94.6 %      Methemoglobin 0.20 %      Carboxyhemoglobin 0.9 %      A-a Gradiant 69.0 mmHg      Temperature 98.6 C      Barometric Pressure for Blood Gas 718 mmHg      Modality Cannula - Nasal     FIO2 32 %     Troponin [[id number]]  (Abnormal) Collected:  12/15/17 0320    Specimen:  Blood Updated:  12/15/17 0404     Troponin I 0.178 (H) ng/mL     Narrative:       Ultra Troponin I Reference Range:         <=0.039 ng/mL: Negative    0.04-0.779 ng/mL: Indeterminate Range.  Suspicious of MI.  Clinical correlation required.       >=0.78  ng/mL: Consistent with myocardial injury.  Clinical correlation required.    Troponin [[id number]]  (Abnormal) Collected:  12/15/17 0510    Specimen:  Blood Updated:  12/15/17 0626     Troponin I 0.139 (H) ng/mL     Narrative:       Ultra Troponin I Reference Range:         <=0.039 ng/mL: Negative    0.04-0.779 ng/mL: Indeterminate Range. Suspicious of MI.  Clinical correlation required.       >=0.78  ng/mL: Consistent with myocardial injury.  Clinical correlation required.    Blood Gas, Arterial [[id number]]  (Abnormal) Collected:  12/15/17 0649    Specimen:  Arterial Blood Updated:  12/15/17 0706     Site Arterial: right radial     Ten's Test Positive     pH, Arterial 7.228 (C) pH units      pCO2, Arterial 64.4 (C) mm Hg      pO2, Arterial 167.4 (H) mm Hg      HCO3, Arterial 26.2 (H) mmol/L      Base Excess, Arterial -3.1 mmol/L      O2 Saturation, Arterial 99.0 %      Hemoglobin, Blood Gas 17.3 (H) g/dL      Hematocrit, Blood Gas 51.0 %      Oxyhemoglobin 98.0 %      Methemoglobin 0.40 %      Carboxyhemoglobin 0.6 %      A-a Gradiant 97.7 mmHg      Temperature 98.6 C      Barometric Pressure for Blood Gas 722 mmHg      Modality BiPap     FIO2 50 %      Set Mech Resp Rate 10     IPAP 12     EPAP 6    Blood Gas, Arterial [[id number]]  (Abnormal) Collected:  12/15/17 1051    Specimen:  Arterial Blood Updated:  12/15/17 1111     Site Arterial: right radial     Ten's Test Positive     pH, Arterial 7.320 (L) pH units      pCO2, Arterial 54.2 (C) mm Hg      pO2, Arterial 72.8 (L) mm Hg      HCO3, Arterial 27.3 (H) mmol/L      Base Excess, Arterial 0.1 mmol/L      O2 Saturation, Arterial 94.2 %      Hemoglobin, Blood Gas 15.7 g/dL      Hematocrit, Blood Gas 46.0 %      Oxyhemoglobin 93.0 %      Methemoglobin 0.30 %      Carboxyhemoglobin 1.0 %      A-a Gradiant 201.9 mmHg      Temperature 98.9 C      Barometric Pressure for Blood Gas 722 mmHg      Modality  Adult Vent     FIO2 50 %      Ventilator Mode AC     Set Tidal Volume 450     Set [facility] Resp Rate 22     PEEP 5    Troponin [[id number]]  (Abnormal) Collected:  12/15/17 1151    Specimen:  Blood Updated:  12/15/17 1304     Troponin I 0.591 (H) ng/mL     Narrative:       Ultra Troponin I Reference Range:         <=0.039 ng/mL: Negative    0.04-0.779 ng/mL: Indeterminate Range. Suspicious of MI.  Clinical correlation required.       >=0.78  ng/mL: Consistent with myocardial injury.  Clinical correlation required.    Troponin [[id number]]  (Abnormal) Collected:  12/15/17 1738    Specimen:  Blood Updated:  12/15/17 1835     Troponin I 0.568 (H) ng/mL     Narrative:       Ultra Troponin I Reference Range:         <=0.039 ng/mL: Negative    0.04-0.779 ng/mL: Indeterminate Range. Suspicious of MI.  Clinical correlation required.       >=0.78  ng/mL: Consistent with myocardial injury.  Clinical correlation required.    Troponin [[id number]]  (Abnormal) Collected:  12/16/17 0052    Specimen:  Blood Updated:  12/16/17 0153     Troponin I 0.583 (H) ng/mL     Narrative:       Ultra Troponin I Reference Range:         <=0.039 ng/mL: Negative    0.04-0.779 ng/mL: Indeterminate Range. Suspicious of MI.  Clinical correlation required.       >=0.78  ng/mL: Consistent with myocardial injury.  Clinical correlation required.    Blood Gas, Arterial [[id number]]  (Abnormal) Collected:  12/16/17 0453    Specimen:  Arterial Blood Updated:  12/16/17 0510     Site Arterial: right radial     Ten's Test N/A     pH, Arterial 7.403 pH units      pCO2, Arterial 44.3 mm Hg      pO2, Arterial 62.5 (L) mm Hg      HCO3, Arterial 27.0 (H) mmol/L      Base Excess, Arterial 1.8 mmol/L      O2 Saturation, Arterial 91.2 %      Hemoglobin, Blood Gas 15.6 g/dL      Hematocrit, Blood Gas 46.0 %      Oxyhemoglobin 89.7 %      Methemoglobin 0.40 %      Carboxyhemoglobin 1.2 %      A-a Gradiant 225.2 mmHg      Temperature 98.6 C      Barometric Pressure for Blood  Gas 722 mmHg      Modality Adult Vent     FIO2 50 %      Ventilator Mode AC/VC     Set Tidal Volume 450     Set Mech Resp Rate 22     PEEP 5    CBC & Differential [[id number]] Collected:  12/16/17 0544    Specimen:  Blood Updated:  12/16/17 0627    Narrative:       The following orders were created for panel order CBC & Differential.  Procedure                               Abnormality         Status                     ---------                               -----------         ------                     CBC Auto Differential[[id number]]        Abnormal            Final result                 Please view results for these tests on the individual orders.    CBC Auto Differential [[id number]]  (Abnormal) Collected:  12/16/17 0544    Specimen:  Blood Updated:  12/16/17 0627     WBC 12.33 10*3/mm3      RBC 4.64 (L) 10*6/mm3      Hemoglobin 14.8 g/dL      Hematocrit 45.4 %      MCV 97.8 (H) fL      MCH 31.9 pg      MCHC 32.6 (L) g/dL      RDW 13.4 %      RDW-SD 46.8 fl      MPV 10.4 (H) fL      Platelets 167 10*3/mm3      Neutrophil % 86.3 (H) %      Lymphocyte % 6.7 (L) %      Monocyte % 6.8 %      Eosinophil % 0.0 %      Basophil % 0.0 %      Immature Grans % 0.2 %      Neutrophils, Absolute 10.64 (H) 10*3/mm3      Lymphocytes, Absolute 0.83 (L) 10*3/mm3      Monocytes, Absolute 0.84 10*3/mm3      Eosinophils, Absolute 0.00 10*3/mm3      Basophils, Absolute 0.00 10*3/mm3      Immature Grans, Absolute 0.02 10*3/mm3     Comprehensive Metabolic Panel [[id number]]  (Abnormal) Collected:  12/16/17 0544    Specimen:  Blood Updated:  12/16/17 0647     Glucose 102 mg/dL      BUN 32 (H) mg/dL      Creatinine 1.21 mg/dL      Sodium 141 mmol/L      Potassium 4.3 mmol/L      Chloride 103 mmol/L      CO2 30.3 mmol/L      Calcium 8.7 mg/dL      Total Protein 6.3 g/dL      Albumin 3.80 g/dL      ALT (SGPT) 115 (H) U/L      AST (SGOT) 40 (H) U/L      Alkaline Phosphatase 57 U/L       Note New Reference Ranges        Total Bilirubin 0.4  mg/dL      eGFR Non  Amer 59 (L) mL/min/1.73      Globulin 2.5 gm/dL      A/G Ratio 1.5 g/dL      BUN/Creatinine Ratio 26.4 (H)     Anion Gap 7.7 mmol/L     Osmolality, Calculated [[id number]]  (Normal) Collected:  12/16/17 0544    Specimen:  Blood Updated:  12/16/17 0647     Osmolality Calc 288.4 mOsm/kg     C-reactive Protein [[id number]]  (Normal) Collected:  12/16/17 0544    Specimen:  Blood Updated:  12/16/17 0650     C-Reactive Protein <0.50 mg/dL     Troponin [[id number]]  (Abnormal) Collected:  12/16/17 0544    Specimen:  Blood Updated:  12/16/17 0700     Troponin I 0.506 (H) ng/mL     Narrative:       Ultra Troponin I Reference Range:         <=0.039 ng/mL: Negative    0.04-0.779 ng/mL: Indeterminate Range. Suspicious of MI.  Clinical correlation required.       >=0.78  ng/mL: Consistent with myocardial injury.  Clinical correlation required.    Troponin [[id number]]  (Abnormal) Collected:  12/16/17 1216    Specimen:  Blood Updated:  12/16/17 1301     Troponin I 0.431 (H) ng/mL     Narrative:       Ultra Troponin I Reference Range:         <=0.039 ng/mL: Negative    0.04-0.779 ng/mL: Indeterminate Range. Suspicious of MI.  Clinical correlation required.       >=0.78  ng/mL: Consistent with myocardial injury.  Clinical correlation required.    Troponin [[id number]]  (Abnormal) Collected:  12/16/17 1824    Specimen:  Blood Updated:  12/16/17 1922     Troponin I 0.388 (H) ng/mL     Narrative:       Ultra Troponin I Reference Range:         <=0.039 ng/mL: Negative    0.04-0.779 ng/mL: Indeterminate Range. Suspicious of MI.  Clinical correlation required.       >=0.78  ng/mL: Consistent with myocardial injury.  Clinical correlation required.    CBC & Differential [[id number]] Collected:  12/17/17 0019    Specimen:  Blood Updated:  12/17/17 0130    Narrative:       The following orders were created for panel order CBC & Differential.  Procedure                               Abnormality         Status                      ---------                               -----------         ------                     CBC Auto Differential[[id number]]        Abnormal            Final result                 Please view results for these tests on the individual orders.    CBC Auto Differential [[id number]]  (Abnormal) Collected:  12/17/17 0019    Specimen:  Blood Updated:  12/17/17 0130     WBC 10.07 10*3/mm3      RBC 4.56 (L) 10*6/mm3      Hemoglobin 15.2 g/dL      Hematocrit 45.6 %      .0 (H) fL      MCH 33.3 (H) pg      MCHC 33.3 g/dL      RDW 13.2 %      RDW-SD 47.4 fl      MPV 10.2 (H) fL      Platelets 154 10*3/mm3      Neutrophil % 89.2 (H) %      Lymphocyte % 5.8 (L) %      Monocyte % 4.9 %      Eosinophil % 0.0 %      Basophil % 0.0 %      Immature Grans % 0.1 %      Neutrophils, Absolute 8.99 (H) 10*3/mm3      Lymphocytes, Absolute 0.58 (L) 10*3/mm3      Monocytes, Absolute 0.49 10*3/mm3      Eosinophils, Absolute 0.00 10*3/mm3      Basophils, Absolute 0.00 10*3/mm3      Immature Grans, Absolute 0.01 10*3/mm3     Comprehensive Metabolic Panel [[id number]]  (Abnormal) Collected:  12/17/17 0019    Specimen:  Blood Updated:  12/17/17 0156     Glucose 110 mg/dL      BUN 35 (H) mg/dL      Creatinine 1.15 mg/dL      Sodium 139 mmol/L      Potassium 4.4 mmol/L      Chloride 104 mmol/L      CO2 27.0 mmol/L      Calcium 8.6 mg/dL      Total Protein 6.4 g/dL      Albumin 3.70 g/dL      ALT (SGPT) 90 (H) U/L      AST (SGOT) 27 U/L      Alkaline Phosphatase 54 U/L       Note New Reference Ranges        Total Bilirubin 0.3 mg/dL      eGFR Non African Amer 63 mL/min/1.73      Globulin 2.7 gm/dL      A/G Ratio 1.4 (L) g/dL      BUN/Creatinine Ratio 30.4 (H)     Anion Gap 8.0 mmol/L     Osmolality, Calculated [[id number]]  (Normal) Collected:  12/17/17 0019    Specimen:  Blood Updated:  12/17/17 0156     Osmolality Calc 286.2 mOsm/kg     Troponin [[id number]]  (Abnormal) Collected:  12/17/17 0019    Specimen:  Blood Updated:  12/17/17 0200      Troponin I 0.237 (H) ng/mL     Narrative:       Ultra Troponin I Reference Range:         <=0.039 ng/mL: Negative    0.04-0.779 ng/mL: Indeterminate Range. Suspicious of MI.  Clinical correlation required.       >=0.78  ng/mL: Consistent with myocardial injury.  Clinical correlation required.    Blood Culture - Blood, [[id number]]  (Normal) Collected:  12/13/17 0313    Specimen:  Blood from Arm, Left Updated:  12/17/17 0331     Blood Culture No growth at 4 days    Blood Culture - Blood, [[id number]]  (Normal) Collected:  12/13/17 0348    Specimen:  Blood from Arm, Left Updated:  12/17/17 0401     Blood Culture No growth at 4 days    Troponin [[id number]]  (Abnormal) Collected:  12/17/17 0516    Specimen:  Blood Updated:  12/17/17 0611     Troponin I 0.246 (H) ng/mL     Narrative:       Ultra Troponin I Reference Range:         <=0.039 ng/mL: Negative    0.04-0.779 ng/mL: Indeterminate Range. Suspicious of MI.  Clinical correlation required.       >=0.78  ng/mL: Consistent with myocardial injury.  Clinical correlation required.          Xray:  Imaging Results (last 24 hours)     ** No results found for the last 24 hours. **             Results Review:     I reviewed the patient's new clinical results.    Medication Review:   Hospital Medications (active)       Dose Frequency Start End    albuterol (PROVENTIL) nebulizer solution 0.083% 2.5 mg/3mL 2.5 mg Every 6 Hours PRN 12/13/2017     Sig - Route: Take 2.5 mg by nebulization Every 6 (Six) Hours As Needed for Wheezing. - Nebulization    Notes to Pharmacy: Prior to [facility] Admission, Patient was on: 2 puffs every 4 to 6 hours as needed    aspirin EC tablet 81 mg 81 mg Daily 12/14/2017     Sig - Route: Take 1 tablet by mouth Daily. - Oral    diltiaZEM (CARDIZEM) 100 mg/100 mL (1 mg/mL) NS infusion (ADV) 10 mg/hr Titrated 12/15/2017     Sig - Route: Infuse 10 mg/hr into a venous catheter Dose Adjusted By Provider As Needed. - Intravenous    DOPamine 400 mg/250 mL (1.6  mg/mL) infusion 2-20 mcg/kg/min × 88.9 kg Titrated 12/15/2017     Sig - Route: Infuse 177.8-1,778 mcg/min into a venous catheter Dose Adjusted By Provider As Needed. - Intravenous    enoxaparin (LOVENOX) syringe 40 mg 40 mg Every 24 Hours 12/13/2017     Sig - Route: Inject 0.4 mL under the skin Daily. - Subcutaneous    famotidine (PEPCID) injection 20 mg 20 mg Every 12 Hours Scheduled 12/16/2017     Sig - Route: Infuse 2 mL into a venous catheter Every 12 (Twelve) Hours. - Intravenous    FENTANYL PCA 1500 MCG/30 ML ([facility]) PCA  - ADS Override Pull   12/16/2017 12/16/2017    Notes to Pharmacy: Created by cabinet override    FENTANYL PCA 1500 MCG/30 ML ([facility]) PCA  Continuous 12/15/2017 12/25/2017    Sig - Route: Infuse  into a venous catheter Continuous. - Intravenous    furosemide (LASIX) injection 20 mg 20 mg Daily 12/16/2017     Sig - Route: Infuse 2 mL into a venous catheter Daily. - Intravenous    ipratropium-albuterol (DUO-NEB) nebulizer solution 3 mL 3 mL Every 4 Hours PRN 12/15/2017     Sig - Route: Take 3 mL by nebulization Every 4 (Four) Hours As Needed for Wheezing or Shortness of Air. - Nebulization    ipratropium-albuterol (DUO-NEB) nebulizer solution 3 mL 3 mL 4 Times Daily - RT 12/15/2017     Sig - Route: Take 3 mL by nebulization 4 (Four) Times a Day. - Nebulization    lisinopril (PRINIVIL,ZESTRIL) tablet 20 mg 20 mg Daily 12/14/2017     Sig - Route: Take 2 tablets by mouth Daily. - Oral    methylPREDNISolone sodium succinate (SOLU-Medrol) injection 40 mg 40 mg Every 12 Hours 12/15/2017     Sig - Route: Infuse 1 mL into a venous catheter Every 12 (Twelve) Hours. - Intravenous    metoprolol tartrate (LOPRESSOR) injection 5 mg 5 mg Every 4 Hours PRN 12/15/2017     Sig - Route: Infuse 5 mL into a venous catheter Every 4 (Four) Hours As Needed (For SBP > 160 and HR > 60). - Intravenous    norepinephrine (LEVOPHED) 8,000 mcg in sodium chloride 0.9 % 250 mL (32 mcg/mL) infusion 0.02-0.3 mcg/kg/min × 88.9 kg  "Titrated 12/15/2017     Sig - Route: Infuse 1.778-26.67 mcg/min into a venous catheter Dose Adjusted By Provider As Needed. - Intravenous    propofol (DIPRIVAN) infusion 10 mg/mL 100 mL 5-50 mcg/kg/min × 88.9 kg Titrated 12/15/2017     Sig - Route: Infuse 444.5-4,445 mcg/min into a venous catheter Dose Adjusted By Provider As Needed. - Intravenous    sodium chloride 0.9 % flush 1-10 mL 1-10 mL As Needed 12/13/2017     Sig - Route: Infuse 1-10 mL into a venous catheter As Needed for Line Care. - Intravenous    sodium chloride 0.9 % flush 10 mL 10 mL As Needed 12/13/2017     Sig - Route: Infuse 10 mL into a venous catheter As Needed for Line Care. - Intravenous    Cosign for Ordering: Accepted by Kulwinder Cho MD on 12/13/2017  4:44 AM    Linked Group 1:  \"And\" Linked Group Details        sodium chloride 0.9 % flush 10 mL 10 mL Every 12 Hours Scheduled 12/15/2017     Sig - Route: 10 mL by Intracatheter route Every 12 (Twelve) Hours. - Intracatheter    Cosign for Ordering: Required by Nelson Ash MD    sodium chloride 0.9 % flush 10 mL 10 mL As Needed 12/15/2017     Sig - Route: 10 mL by Intracatheter route As Needed for Line Care (After Medication Administration). - Intracatheter    Cosign for Ordering: Required by Nelson Ash MD    spironolactone (ALDACTONE) tablet 25 mg 25 mg Daily 12/13/2017     Sig - Route: Take 1 tablet by mouth Daily. - Oral    vecuronium (NORCURON) injection 10 mg 10 mg Once 12/15/2017     Sig - Route: Infuse 10 mg into a venous catheter 1 (One) Time. - Intravenous    furosemide (LASIX) tablet 20 mg (Discontinued) 20 mg Daily 12/13/2017 12/16/2017    Sig - Route: Take 1 tablet by mouth Daily. - Oral    metoprolol succinate XL (TOPROL-XL) 24 hr tablet 50 mg (Discontinued) 50 mg Daily 12/13/2017 12/16/2017    Sig - Route: Take 1 tablet by mouth Daily. - Oral          Assessment/Plan    1.Respiratory Failure: ?volume overload with underlying lung dz.  Pt diuresised 4100.  Resting on " vent with sedation will continue current settings today.  ABG noted. UOP 3300  2.COPD: continue monitor  3.Elevated Cardiac Enzymes/Cardiac arrhythmia: NSR rate 60's currently.  Trop 0.5, stress test neg for areas of ischemia.  Cardiology following appreciate input.  Continue diuresis.  4.Diastolic CHF: likely cause combination COPD and CHF of #1 with volume overload.  5.Hypokalemia: resolved with tx         Tab Ash MD  12/17/17  7:07 AM

## 2017-12-17 NOTE — PLAN OF CARE
Problem: Patient Care Overview (Adult)  Goal: Plan of Care Review  Outcome: Ongoing (interventions implemented as appropriate)  Goal: Adult Individualization and Mutuality  Outcome: Ongoing (interventions implemented as appropriate)  Goal: Discharge Needs Assessment  Outcome: Ongoing (interventions implemented as appropriate)    Problem: Pain, Acute (Adult)  Goal: Acceptable Pain Control/Comfort Level  Outcome: Ongoing (interventions implemented as appropriate)    Problem: Cardiac Output, Decreased (Adult)  Goal: Adequate Cardiac Output/Effective Tissue Perfusion  Outcome: Ongoing (interventions implemented as appropriate)    Problem: Skin Integrity Impairment, Risk/Actual (Adult)  Goal: Identify Related Risk Factors and Signs and Symptoms  Outcome: Ongoing (interventions implemented as appropriate)  Goal: Skin Integrity/Wound Healing  Outcome: Ongoing (interventions implemented as appropriate)    Problem: Respiratory Insufficiency (Adult)  Goal: Identify Related Risk Factors and Signs and Symptoms  Outcome: Ongoing (interventions implemented as appropriate)  Goal: Acid/Base Balance  Outcome: Ongoing (interventions implemented as appropriate)  Goal: Effective Ventilation  Outcome: Ongoing (interventions implemented as appropriate)    Problem: Pressure Ulcer Risk (Junior Scale) (Adult,Obstetrics,Pediatric)  Goal: Identify Related Risk Factors and Signs and Symptoms  Outcome: Ongoing (interventions implemented as appropriate)  Goal: Skin Integrity  Outcome: Ongoing (interventions implemented as appropriate)    Problem: Mechanical Ventilation, Invasive (Adult)  Goal: Signs and Symptoms of Listed Potential Problems Will be Absent or Manageable (Mechanical Ventilation, Invasive)  Outcome: Ongoing (interventions implemented as appropriate)

## 2017-12-18 ENCOUNTER — APPOINTMENT (OUTPATIENT)
Dept: GENERAL RADIOLOGY | Facility: HOSPITAL | Age: 72
End: 2017-12-18

## 2017-12-18 LAB
A-A DO2: 279.8 MMHG (ref 0–300)
ALBUMIN SERPL-MCNC: 3.7 G/DL (ref 3.4–4.8)
ALBUMIN/GLOB SERPL: 1.4 G/DL (ref 1.5–2.5)
ALP SERPL-CCNC: 56 U/L (ref 40–129)
ALT SERPL W P-5'-P-CCNC: 56 U/L (ref 10–44)
ANION GAP SERPL CALCULATED.3IONS-SCNC: 7.4 MMOL/L (ref 3.6–11.2)
ARTERIAL PATENCY WRIST A: POSITIVE
AST SERPL-CCNC: 19 U/L (ref 10–34)
ATMOSPHERIC PRESS: 727 MMHG
BACTERIA SPEC AEROBE CULT: NORMAL
BACTERIA SPEC AEROBE CULT: NORMAL
BASE EXCESS BLDA CALC-SCNC: 3.7 MMOL/L
BASOPHILS # BLD AUTO: 0 10*3/MM3 (ref 0–0.3)
BASOPHILS # BLD AUTO: 0.01 10*3/MM3 (ref 0–0.3)
BASOPHILS NFR BLD AUTO: 0 % (ref 0–2)
BASOPHILS NFR BLD AUTO: 0.1 % (ref 0–2)
BDY SITE: ABNORMAL
BILIRUB SERPL-MCNC: 0.7 MG/DL (ref 0.2–1.8)
BILIRUB UR QL STRIP: NEGATIVE
BODY TEMPERATURE: 98.6 C
BUN BLD-MCNC: 38 MG/DL (ref 7–21)
BUN/CREAT SERPL: 33.6 (ref 7–25)
CALCIUM SPEC-SCNC: 8.6 MG/DL (ref 7.7–10)
CHLORIDE SERPL-SCNC: 105 MMOL/L (ref 99–112)
CLARITY UR: CLEAR
CO2 SERPL-SCNC: 29.6 MMOL/L (ref 24.3–31.9)
COHGB MFR BLD: 0.7 % (ref 0–5)
COLOR UR: YELLOW
CREAT BLD-MCNC: 1.13 MG/DL (ref 0.43–1.29)
DEPRECATED RDW RBC AUTO: 46.4 FL (ref 37–54)
DEPRECATED RDW RBC AUTO: 47.2 FL (ref 37–54)
EOSINOPHIL # BLD AUTO: 0 10*3/MM3 (ref 0–0.7)
EOSINOPHIL # BLD AUTO: 0.01 10*3/MM3 (ref 0–0.7)
EOSINOPHIL NFR BLD AUTO: 0 % (ref 0–7)
EOSINOPHIL NFR BLD AUTO: 0.1 % (ref 0–7)
ERYTHROCYTE [DISTWIDTH] IN BLOOD BY AUTOMATED COUNT: 13.1 % (ref 11.5–14.5)
ERYTHROCYTE [DISTWIDTH] IN BLOOD BY AUTOMATED COUNT: 13.2 % (ref 11.5–14.5)
GFR SERPL CREATININE-BSD FRML MDRD: 64 ML/MIN/1.73
GLOBULIN UR ELPH-MCNC: 2.7 GM/DL
GLUCOSE BLD-MCNC: 124 MG/DL (ref 70–110)
GLUCOSE UR STRIP-MCNC: NEGATIVE MG/DL
HCO3 BLDA-SCNC: 28.6 MMOL/L (ref 22–26)
HCT VFR BLD AUTO: 48.6 % (ref 42–52)
HCT VFR BLD AUTO: 51.4 % (ref 42–52)
HCT VFR BLD CALC: 49 % (ref 42–52)
HGB BLD-MCNC: 16.1 G/DL (ref 14–18)
HGB BLD-MCNC: 17 G/DL (ref 14–18)
HGB BLDA-MCNC: 16.8 G/DL (ref 12–16)
HGB UR QL STRIP.AUTO: NEGATIVE
HOROWITZ INDEX BLD+IHG-RTO: 60 %
IMM GRANULOCYTES # BLD: 0.04 10*3/MM3 (ref 0–0.03)
IMM GRANULOCYTES # BLD: 0.04 10*3/MM3 (ref 0–0.03)
IMM GRANULOCYTES NFR BLD: 0.2 % (ref 0–0.5)
IMM GRANULOCYTES NFR BLD: 0.2 % (ref 0–0.5)
KETONES UR QL STRIP: NEGATIVE
L PNEUMO1 AG UR QL IA: NEGATIVE
LEUKOCYTE ESTERASE UR QL STRIP.AUTO: NEGATIVE
LYMPHOCYTES # BLD AUTO: 0.52 10*3/MM3 (ref 1–3)
LYMPHOCYTES # BLD AUTO: 0.69 10*3/MM3 (ref 1–3)
LYMPHOCYTES NFR BLD AUTO: 2.9 % (ref 16–46)
LYMPHOCYTES NFR BLD AUTO: 4.2 % (ref 16–46)
M PNEUMO IGM SER QL: NEGATIVE
MCH RBC QN AUTO: 32.4 PG (ref 27–33)
MCH RBC QN AUTO: 32.7 PG (ref 27–33)
MCHC RBC AUTO-ENTMCNC: 33.1 G/DL (ref 33–37)
MCHC RBC AUTO-ENTMCNC: 33.1 G/DL (ref 33–37)
MCV RBC AUTO: 98.1 FL (ref 80–94)
MCV RBC AUTO: 98.8 FL (ref 80–94)
METHGB BLD QL: 0.5 % (ref 0–3)
MODALITY: ABNORMAL
MONOCYTES # BLD AUTO: 0.72 10*3/MM3 (ref 0.1–0.9)
MONOCYTES # BLD AUTO: 0.85 10*3/MM3 (ref 0.1–0.9)
MONOCYTES NFR BLD AUTO: 4.3 % (ref 0–12)
MONOCYTES NFR BLD AUTO: 4.7 % (ref 0–12)
NEUTROPHILS # BLD AUTO: 15.11 10*3/MM3 (ref 1.4–6.5)
NEUTROPHILS # BLD AUTO: 16.64 10*3/MM3 (ref 1.4–6.5)
NEUTROPHILS NFR BLD AUTO: 91.2 % (ref 40–75)
NEUTROPHILS NFR BLD AUTO: 92.1 % (ref 40–75)
NITRITE UR QL STRIP: NEGATIVE
OSMOLALITY SERPL CALC.SUM OF ELEC: 293.6 MOSM/KG (ref 273–305)
OXYHGB MFR BLDV: 95.2 % (ref 85–100)
PCO2 BLDA: 43.6 MM HG (ref 35–45)
PEEP RESPIRATORY: 5 CM[H2O]
PH BLDA: 7.43 PH UNITS (ref 7.35–7.45)
PH UR STRIP.AUTO: <=5 [PH] (ref 5–8)
PLATELET # BLD AUTO: 121 10*3/MM3 (ref 130–400)
PLATELET # BLD AUTO: 137 10*3/MM3 (ref 130–400)
PMV BLD AUTO: 10.2 FL (ref 6–10)
PMV BLD AUTO: 9.8 FL (ref 6–10)
PO2 BLDA: 80.2 MM HG (ref 80–100)
POTASSIUM BLD-SCNC: 4 MMOL/L (ref 3.5–5.3)
PROT SERPL-MCNC: 6.4 G/DL (ref 6–8)
PROT UR QL STRIP: NEGATIVE
RBC # BLD AUTO: 4.92 10*6/MM3 (ref 4.7–6.1)
RBC # BLD AUTO: 5.24 10*6/MM3 (ref 4.7–6.1)
SAO2 % BLDCOA: 96.4 % (ref 90–100)
SET MECH RESP RATE: 22
SODIUM BLD-SCNC: 142 MMOL/L (ref 135–153)
SP GR UR STRIP: 1.02 (ref 1–1.03)
TROPONIN I SERPL-MCNC: 0.18 NG/ML
TROPONIN I SERPL-MCNC: 0.19 NG/ML
TROPONIN I SERPL-MCNC: 0.21 NG/ML
TROPONIN I SERPL-MCNC: 0.21 NG/ML
UROBILINOGEN UR QL STRIP: NORMAL
VENTILATOR MODE: AC
VT ON VENT VENT: 450 ML
WBC NRBC COR # BLD: 16.57 10*3/MM3 (ref 4.5–12.5)
WBC NRBC COR # BLD: 18.06 10*3/MM3 (ref 4.5–12.5)

## 2017-12-18 PROCEDURE — 71010 XR CHEST 1 VW: CPT | Performed by: RADIOLOGY

## 2017-12-18 PROCEDURE — 87899 AGENT NOS ASSAY W/OPTIC: CPT | Performed by: PHYSICIAN ASSISTANT

## 2017-12-18 PROCEDURE — 82805 BLOOD GASES W/O2 SATURATION: CPT | Performed by: FAMILY MEDICINE

## 2017-12-18 PROCEDURE — 25010000002 PIPERACILLIN-TAZOBACTAM: Performed by: PHYSICIAN ASSISTANT

## 2017-12-18 PROCEDURE — 25010000002 VANCOMYCIN PER 500 MG

## 2017-12-18 PROCEDURE — 84484 ASSAY OF TROPONIN QUANT: CPT | Performed by: FAMILY MEDICINE

## 2017-12-18 PROCEDURE — 94003 VENT MGMT INPAT SUBQ DAY: CPT

## 2017-12-18 PROCEDURE — 25010000002 ENOXAPARIN PER 10 MG: Performed by: FAMILY MEDICINE

## 2017-12-18 PROCEDURE — 94799 UNLISTED PULMONARY SVC/PX: CPT

## 2017-12-18 PROCEDURE — 71010 HC CHEST PA OR AP: CPT

## 2017-12-18 PROCEDURE — 81003 URINALYSIS AUTO W/O SCOPE: CPT | Performed by: FAMILY MEDICINE

## 2017-12-18 PROCEDURE — 25010000002 METHYLPREDNISOLONE PER 40 MG: Performed by: NURSE PRACTITIONER

## 2017-12-18 PROCEDURE — 94002 VENT MGMT INPAT INIT DAY: CPT

## 2017-12-18 PROCEDURE — 25010000002 PROPOFOL 1000 MG/ML EMULSION: Performed by: INTERNAL MEDICINE

## 2017-12-18 PROCEDURE — 83050 HGB METHEMOGLOBIN QUAN: CPT | Performed by: FAMILY MEDICINE

## 2017-12-18 PROCEDURE — 87186 SC STD MICRODIL/AGAR DIL: CPT | Performed by: FAMILY MEDICINE

## 2017-12-18 PROCEDURE — 25010000002 FUROSEMIDE PER 20 MG: Performed by: INTERNAL MEDICINE

## 2017-12-18 PROCEDURE — 87077 CULTURE AEROBIC IDENTIFY: CPT | Performed by: FAMILY MEDICINE

## 2017-12-18 PROCEDURE — 80053 COMPREHEN METABOLIC PANEL: CPT | Performed by: FAMILY MEDICINE

## 2017-12-18 PROCEDURE — 87040 BLOOD CULTURE FOR BACTERIA: CPT | Performed by: FAMILY MEDICINE

## 2017-12-18 PROCEDURE — 85025 COMPLETE CBC W/AUTO DIFF WBC: CPT | Performed by: FAMILY MEDICINE

## 2017-12-18 PROCEDURE — 36600 WITHDRAWAL OF ARTERIAL BLOOD: CPT | Performed by: FAMILY MEDICINE

## 2017-12-18 PROCEDURE — 82375 ASSAY CARBOXYHB QUANT: CPT | Performed by: FAMILY MEDICINE

## 2017-12-18 RX ORDER — ACETAMINOPHEN 160 MG/5ML
650 SOLUTION ORAL EVERY 6 HOURS PRN
Status: DISCONTINUED | OUTPATIENT
Start: 2017-12-18 | End: 2017-12-20 | Stop reason: HOSPADM

## 2017-12-18 RX ADMIN — FAMOTIDINE 20 MG: 10 INJECTION INTRAVENOUS at 08:19

## 2017-12-18 RX ADMIN — Medication 10 ML: at 08:20

## 2017-12-18 RX ADMIN — SPIRONOLACTONE 25 MG: 25 TABLET ORAL at 08:18

## 2017-12-18 RX ADMIN — PROPOFOL 45 MCG/KG/MIN: 10 INJECTION, EMULSION INTRAVENOUS at 08:17

## 2017-12-18 RX ADMIN — ASPIRIN 81 MG: 81 TABLET ORAL at 08:18

## 2017-12-18 RX ADMIN — IPRATROPIUM BROMIDE AND ALBUTEROL SULFATE 3 ML: .5; 3 SOLUTION RESPIRATORY (INHALATION) at 06:49

## 2017-12-18 RX ADMIN — IPRATROPIUM BROMIDE AND ALBUTEROL SULFATE 3 ML: .5; 3 SOLUTION RESPIRATORY (INHALATION) at 19:00

## 2017-12-18 RX ADMIN — PROPOFOL 45 MCG/KG/MIN: 10 INJECTION, EMULSION INTRAVENOUS at 13:06

## 2017-12-18 RX ADMIN — PROPOFOL 45 MCG/KG/MIN: 10 INJECTION, EMULSION INTRAVENOUS at 04:50

## 2017-12-18 RX ADMIN — FUROSEMIDE 20 MG: 10 INJECTION, SOLUTION INTRAMUSCULAR; INTRAVENOUS at 08:18

## 2017-12-18 RX ADMIN — IPRATROPIUM BROMIDE AND ALBUTEROL SULFATE 3 ML: .5; 3 SOLUTION RESPIRATORY (INHALATION) at 13:16

## 2017-12-18 RX ADMIN — PIPERACILLIN SODIUM,TAZOBACTAM SODIUM 3.38 G: 3; .375 INJECTION, POWDER, FOR SOLUTION INTRAVENOUS at 18:16

## 2017-12-18 RX ADMIN — METHYLPREDNISOLONE SODIUM SUCCINATE 40 MG: 125 INJECTION, POWDER, FOR SOLUTION INTRAMUSCULAR; INTRAVENOUS at 05:41

## 2017-12-18 RX ADMIN — VANCOMYCIN HYDROCHLORIDE 1750 MG: 5 INJECTION, POWDER, LYOPHILIZED, FOR SOLUTION INTRAVENOUS at 08:17

## 2017-12-18 RX ADMIN — PROPOFOL 45 MCG/KG/MIN: 10 INJECTION, EMULSION INTRAVENOUS at 21:18

## 2017-12-18 RX ADMIN — VANCOMYCIN HYDROCHLORIDE 1250 MG: 5 INJECTION, POWDER, LYOPHILIZED, FOR SOLUTION INTRAVENOUS at 20:26

## 2017-12-18 RX ADMIN — Medication: at 00:54

## 2017-12-18 RX ADMIN — METHYLPREDNISOLONE SODIUM SUCCINATE 40 MG: 125 INJECTION, POWDER, FOR SOLUTION INTRAMUSCULAR; INTRAVENOUS at 17:16

## 2017-12-18 RX ADMIN — Medication 10 ML: at 20:26

## 2017-12-18 RX ADMIN — ENOXAPARIN SODIUM 40 MG: 40 INJECTION SUBCUTANEOUS at 08:18

## 2017-12-18 RX ADMIN — IPRATROPIUM BROMIDE AND ALBUTEROL SULFATE 3 ML: .5; 3 SOLUTION RESPIRATORY (INHALATION) at 00:38

## 2017-12-18 RX ADMIN — PROPOFOL 45 MCG/KG/MIN: 10 INJECTION, EMULSION INTRAVENOUS at 17:32

## 2017-12-18 RX ADMIN — FAMOTIDINE 20 MG: 10 INJECTION INTRAVENOUS at 20:26

## 2017-12-18 RX ADMIN — LISINOPRIL 20 MG: 10 TABLET ORAL at 08:18

## 2017-12-18 NOTE — SIGNIFICANT NOTE
Dr. LATHA Ash called to check on patient.  He stated that he had entered some orders regarding elevated temp and asked that RN make sure patient was consulted with Dr. Cortez.  He also asked who was managing patient's vent status since Dr. Quigley was out of town, and RN explained that the patient's physician would be handling that.  No further orders received at this time.

## 2017-12-18 NOTE — PROGRESS NOTES
Progress Note   12/18/17      Subjective     Interval History:     Complaints: Resting on vent at FiO2 60%.  Izabela NG feeds, no further bradycardia.  No issues noted per nursing.        Objective     Intake & Output (last day)       12/17 0701 - 12/18 0700 12/18 0701 - 12/19 0700    I.V. (mL/kg) 677 (7.6)     Total Intake(mL/kg) 677 (7.6)     Urine (mL/kg/hr) 2360 (1.1)     Total Output 2360      Net -1683                  Vital Signs  Temp:  [98.7 °F (37.1 °C)-100 °F (37.8 °C)] 99.8 °F (37.7 °C)  Heart Rate:  [55-79] 70  Resp:  [22-26] 26  BP: (119-148)/(59-83) 136/76  FiO2 (%):  [60 %] 60 %    Physical Exam:   General Sedated on vent   Lungs dec bases with few scattered wheezes   Heart regular rhythm & normal rate   Abdomen normal bowel sounds   Extremities no edema  Labs:  Lab Results (last 72 hours)     Procedure Component Value Units Date/Time    Blood Gas, Arterial [[id number]]  (Abnormal) Collected:  12/15/17 1051    Specimen:  Arterial Blood Updated:  12/15/17 1111     Site Arterial: right radial     Ten's Test Positive     pH, Arterial 7.320 (L) pH units      pCO2, Arterial 54.2 (C) mm Hg      pO2, Arterial 72.8 (L) mm Hg      HCO3, Arterial 27.3 (H) mmol/L      Base Excess, Arterial 0.1 mmol/L      O2 Saturation, Arterial 94.2 %      Hemoglobin, Blood Gas 15.7 g/dL      Hematocrit, Blood Gas 46.0 %      Oxyhemoglobin 93.0 %      Methemoglobin 0.30 %      Carboxyhemoglobin 1.0 %      A-a Gradiant 201.9 mmHg      Temperature 98.9 C      Barometric Pressure for Blood Gas 722 mmHg      Modality Adult Vent     FIO2 50 %      Ventilator Mode AC     Set Tidal Volume 450     Set [facility] Resp Rate 22     PEEP 5    Troponin [[id number]]  (Abnormal) Collected:  12/15/17 1151    Specimen:  Blood Updated:  12/15/17 1304     Troponin I 0.591 (H) ng/mL     Narrative:       Ultra Troponin I Reference Range:         <=0.039 ng/mL: Negative    0.04-0.779 ng/mL: Indeterminate Range. Suspicious of MI.  Clinical correlation  required.       >=0.78  ng/mL: Consistent with myocardial injury.  Clinical correlation required.    Troponin [[id number]]  (Abnormal) Collected:  12/15/17 1738    Specimen:  Blood Updated:  12/15/17 1835     Troponin I 0.568 (H) ng/mL     Narrative:       Ultra Troponin I Reference Range:         <=0.039 ng/mL: Negative    0.04-0.779 ng/mL: Indeterminate Range. Suspicious of MI.  Clinical correlation required.       >=0.78  ng/mL: Consistent with myocardial injury.  Clinical correlation required.    Troponin [[id number]]  (Abnormal) Collected:  12/16/17 0052    Specimen:  Blood Updated:  12/16/17 0153     Troponin I 0.583 (H) ng/mL     Narrative:       Ultra Troponin I Reference Range:         <=0.039 ng/mL: Negative    0.04-0.779 ng/mL: Indeterminate Range. Suspicious of MI.  Clinical correlation required.       >=0.78  ng/mL: Consistent with myocardial injury.  Clinical correlation required.    Blood Gas, Arterial [[id number]]  (Abnormal) Collected:  12/16/17 0453    Specimen:  Arterial Blood Updated:  12/16/17 0510     Site Arterial: right radial     Ten's Test N/A     pH, Arterial 7.403 pH units      pCO2, Arterial 44.3 mm Hg      pO2, Arterial 62.5 (L) mm Hg      HCO3, Arterial 27.0 (H) mmol/L      Base Excess, Arterial 1.8 mmol/L      O2 Saturation, Arterial 91.2 %      Hemoglobin, Blood Gas 15.6 g/dL      Hematocrit, Blood Gas 46.0 %      Oxyhemoglobin 89.7 %      Methemoglobin 0.40 %      Carboxyhemoglobin 1.2 %      A-a Gradiant 225.2 mmHg      Temperature 98.6 C      Barometric Pressure for Blood Gas 722 mmHg      Modality Adult Vent     FIO2 50 %      Ventilator Mode AC/VC     Set Tidal Volume 450     Set Mech Resp Rate 22     PEEP 5    CBC & Differential [[id number]] Collected:  12/16/17 0544    Specimen:  Blood Updated:  12/16/17 0627    Narrative:       The following orders were created for panel order CBC & Differential.  Procedure                               Abnormality         Status                      ---------                               -----------         ------                     CBC Auto Differential[[id number]]        Abnormal            Final result                 Please view results for these tests on the individual orders.    CBC Auto Differential [[id number]]  (Abnormal) Collected:  12/16/17 0544    Specimen:  Blood Updated:  12/16/17 0627     WBC 12.33 10*3/mm3      RBC 4.64 (L) 10*6/mm3      Hemoglobin 14.8 g/dL      Hematocrit 45.4 %      MCV 97.8 (H) fL      MCH 31.9 pg      MCHC 32.6 (L) g/dL      RDW 13.4 %      RDW-SD 46.8 fl      MPV 10.4 (H) fL      Platelets 167 10*3/mm3      Neutrophil % 86.3 (H) %      Lymphocyte % 6.7 (L) %      Monocyte % 6.8 %      Eosinophil % 0.0 %      Basophil % 0.0 %      Immature Grans % 0.2 %      Neutrophils, Absolute 10.64 (H) 10*3/mm3      Lymphocytes, Absolute 0.83 (L) 10*3/mm3      Monocytes, Absolute 0.84 10*3/mm3      Eosinophils, Absolute 0.00 10*3/mm3      Basophils, Absolute 0.00 10*3/mm3      Immature Grans, Absolute 0.02 10*3/mm3     Comprehensive Metabolic Panel [[id number]]  (Abnormal) Collected:  12/16/17 0544    Specimen:  Blood Updated:  12/16/17 0647     Glucose 102 mg/dL      BUN 32 (H) mg/dL      Creatinine 1.21 mg/dL      Sodium 141 mmol/L      Potassium 4.3 mmol/L      Chloride 103 mmol/L      CO2 30.3 mmol/L      Calcium 8.7 mg/dL      Total Protein 6.3 g/dL      Albumin 3.80 g/dL      ALT (SGPT) 115 (H) U/L      AST (SGOT) 40 (H) U/L      Alkaline Phosphatase 57 U/L       Note New Reference Ranges        Total Bilirubin 0.4 mg/dL      eGFR Non African Amer 59 (L) mL/min/1.73      Globulin 2.5 gm/dL      A/G Ratio 1.5 g/dL      BUN/Creatinine Ratio 26.4 (H)     Anion Gap 7.7 mmol/L     Osmolality, Calculated [[id number]]  (Normal) Collected:  12/16/17 0544    Specimen:  Blood Updated:  12/16/17 0647     Osmolality Calc 288.4 mOsm/kg     C-reactive Protein [[id number]]  (Normal) Collected:  12/16/17 0544    Specimen:  Blood Updated:   12/16/17 0650     C-Reactive Protein <0.50 mg/dL     Troponin [[id number]]  (Abnormal) Collected:  12/16/17 0544    Specimen:  Blood Updated:  12/16/17 0700     Troponin I 0.506 (H) ng/mL     Narrative:       Ultra Troponin I Reference Range:         <=0.039 ng/mL: Negative    0.04-0.779 ng/mL: Indeterminate Range. Suspicious of MI.  Clinical correlation required.       >=0.78  ng/mL: Consistent with myocardial injury.  Clinical correlation required.    Troponin [[id number]]  (Abnormal) Collected:  12/16/17 1216    Specimen:  Blood Updated:  12/16/17 1301     Troponin I 0.431 (H) ng/mL     Narrative:       Ultra Troponin I Reference Range:         <=0.039 ng/mL: Negative    0.04-0.779 ng/mL: Indeterminate Range. Suspicious of MI.  Clinical correlation required.       >=0.78  ng/mL: Consistent with myocardial injury.  Clinical correlation required.    Troponin [[id number]]  (Abnormal) Collected:  12/16/17 1824    Specimen:  Blood Updated:  12/16/17 1922     Troponin I 0.388 (H) ng/mL     Narrative:       Ultra Troponin I Reference Range:         <=0.039 ng/mL: Negative    0.04-0.779 ng/mL: Indeterminate Range. Suspicious of MI.  Clinical correlation required.       >=0.78  ng/mL: Consistent with myocardial injury.  Clinical correlation required.    CBC & Differential [[id number]] Collected:  12/17/17 0019    Specimen:  Blood Updated:  12/17/17 0130    Narrative:       The following orders were created for panel order CBC & Differential.  Procedure                               Abnormality         Status                     ---------                               -----------         ------                     CBC Auto Differential[[id number]]        Abnormal            Final result                 Please view results for these tests on the individual orders.    CBC Auto Differential [[id number]]  (Abnormal) Collected:  12/17/17 0019    Specimen:  Blood Updated:  12/17/17 0130     WBC 10.07 10*3/mm3      RBC 4.56 (L)  10*6/mm3      Hemoglobin 15.2 g/dL      Hematocrit 45.6 %      .0 (H) fL      MCH 33.3 (H) pg      MCHC 33.3 g/dL      RDW 13.2 %      RDW-SD 47.4 fl      MPV 10.2 (H) fL      Platelets 154 10*3/mm3      Neutrophil % 89.2 (H) %      Lymphocyte % 5.8 (L) %      Monocyte % 4.9 %      Eosinophil % 0.0 %      Basophil % 0.0 %      Immature Grans % 0.1 %      Neutrophils, Absolute 8.99 (H) 10*3/mm3      Lymphocytes, Absolute 0.58 (L) 10*3/mm3      Monocytes, Absolute 0.49 10*3/mm3      Eosinophils, Absolute 0.00 10*3/mm3      Basophils, Absolute 0.00 10*3/mm3      Immature Grans, Absolute 0.01 10*3/mm3     Comprehensive Metabolic Panel [[id number]]  (Abnormal) Collected:  12/17/17 0019    Specimen:  Blood Updated:  12/17/17 0156     Glucose 110 mg/dL      BUN 35 (H) mg/dL      Creatinine 1.15 mg/dL      Sodium 139 mmol/L      Potassium 4.4 mmol/L      Chloride 104 mmol/L      CO2 27.0 mmol/L      Calcium 8.6 mg/dL      Total Protein 6.4 g/dL      Albumin 3.70 g/dL      ALT (SGPT) 90 (H) U/L      AST (SGOT) 27 U/L      Alkaline Phosphatase 54 U/L       Note New Reference Ranges        Total Bilirubin 0.3 mg/dL      eGFR Non African Amer 63 mL/min/1.73      Globulin 2.7 gm/dL      A/G Ratio 1.4 (L) g/dL      BUN/Creatinine Ratio 30.4 (H)     Anion Gap 8.0 mmol/L     Osmolality, Calculated [[id number]]  (Normal) Collected:  12/17/17 0019    Specimen:  Blood Updated:  12/17/17 0156     Osmolality Calc 286.2 mOsm/kg     Troponin [[id number]]  (Abnormal) Collected:  12/17/17 0019    Specimen:  Blood Updated:  12/17/17 0206     Troponin I 0.237 (H) ng/mL     Narrative:       Ultra Troponin I Reference Range:         <=0.039 ng/mL: Negative    0.04-0.779 ng/mL: Indeterminate Range. Suspicious of MI.  Clinical correlation required.       >=0.78  ng/mL: Consistent with myocardial injury.  Clinical correlation required.    Troponin [[id number]]  (Abnormal) Collected:  12/17/17 0516    Specimen:  Blood Updated:  12/17/17 0611      Troponin I 0.246 (H) ng/mL     Narrative:       Ultra Troponin I Reference Range:         <=0.039 ng/mL: Negative    0.04-0.779 ng/mL: Indeterminate Range. Suspicious of MI.  Clinical correlation required.       >=0.78  ng/mL: Consistent with myocardial injury.  Clinical correlation required.    Blood Gas, Arterial [[id number]]  (Abnormal) Collected:  12/17/17 0728    Specimen:  Arterial Blood Updated:  12/17/17 0740     Site Arterial: right radial     Ten's Test Positive     pH, Arterial 7.377 pH units      pCO2, Arterial 50.6 (C) mm Hg      pO2, Arterial 69.0 (L) mm Hg      HCO3, Arterial 29.1 (H) mmol/L      Base Excess, Arterial 2.7 mmol/L      O2 Saturation, Arterial 93.0 %      Hemoglobin, Blood Gas 16.9 (H) g/dL      Hematocrit, Blood Gas 50.0 %      Oxyhemoglobin 92.0 %      Methemoglobin 0.40 %      Carboxyhemoglobin 0.7 %      A-a Gradiant 283.3 mmHg      Temperature 98.6 C      Barometric Pressure for Blood Gas 727 mmHg      Modality Ventilator     FIO2 60 %      Ventilator Mode ac     Set Tidal Volume 450     Set Mech Resp Rate 22     PEEP 5    Troponin [[id number]]  (Abnormal) Collected:  12/17/17 1154    Specimen:  Blood Updated:  12/17/17 1259     Troponin I 0.247 (H) ng/mL     Narrative:       Ultra Troponin I Reference Range:         <=0.039 ng/mL: Negative    0.04-0.779 ng/mL: Indeterminate Range. Suspicious of MI.  Clinical correlation required.       >=0.78  ng/mL: Consistent with myocardial injury.  Clinical correlation required.    Troponin [[id number]]  (Abnormal) Collected:  12/17/17 1716    Specimen:  Blood Updated:  12/17/17 1812     Troponin I 0.292 (H) ng/mL     Narrative:       Ultra Troponin I Reference Range:         <=0.039 ng/mL: Negative    0.04-0.779 ng/mL: Indeterminate Range. Suspicious of MI.  Clinical correlation required.       >=0.78  ng/mL: Consistent with myocardial injury.  Clinical correlation required.    CBC & Differential [[id number]] Collected:  12/18/17 0049     Specimen:  Blood Updated:  12/18/17 0131    Narrative:       The following orders were created for panel order CBC & Differential.  Procedure                               Abnormality         Status                     ---------                               -----------         ------                     CBC Auto Differential[[id number]]        Abnormal            Final result                 Please view results for these tests on the individual orders.    CBC Auto Differential [[id number]]  (Abnormal) Collected:  12/18/17 0049    Specimen:  Blood Updated:  12/18/17 0131     WBC 18.06 (H) 10*3/mm3      RBC 5.24 10*6/mm3      Hemoglobin 17.0 g/dL      Hematocrit 51.4 %      MCV 98.1 (H) fL      MCH 32.4 pg      MCHC 33.1 g/dL      RDW 13.2 %      RDW-SD 47.2 fl      MPV 10.2 (H) fL      Platelets 137 10*3/mm3      Neutrophil % 92.1 (H) %      Lymphocyte % 2.9 (L) %      Monocyte % 4.7 %      Eosinophil % 0.1 %      Basophil % 0.0 %      Immature Grans % 0.2 %      Neutrophils, Absolute 16.64 (H) 10*3/mm3      Lymphocytes, Absolute 0.52 (L) 10*3/mm3      Monocytes, Absolute 0.85 10*3/mm3      Eosinophils, Absolute 0.01 10*3/mm3      Basophils, Absolute 0.00 10*3/mm3      Immature Grans, Absolute 0.04 (H) 10*3/mm3     Troponin [[id number]]  (Abnormal) Collected:  12/18/17 0049    Specimen:  Blood Updated:  12/18/17 0144     Troponin I 0.208 (H) ng/mL     Narrative:       Ultra Troponin I Reference Range:         <=0.039 ng/mL: Negative    0.04-0.779 ng/mL: Indeterminate Range. Suspicious of MI.  Clinical correlation required.       >=0.78  ng/mL: Consistent with myocardial injury.  Clinical correlation required.    Blood Culture - Blood, [[id number]]  (Normal) Collected:  12/13/17 0313    Specimen:  Blood from Arm, Left Updated:  12/18/17 0331     Blood Culture No growth at 5 days    Blood Culture - Blood, [[id number]]  (Normal) Collected:  12/13/17 0348    Specimen:  Blood from Arm, Left Updated:  12/18/17 0401     Blood  Culture No growth at 5 days    CBC & Differential [[id number]] Collected:  12/18/17 0537    Specimen:  Blood Updated:  12/18/17 0547    Narrative:       The following orders were created for panel order CBC & Differential.  Procedure                               Abnormality         Status                     ---------                               -----------         ------                     CBC Auto Differential[[id number]]        Abnormal            Final result                 Please view results for these tests on the individual orders.    CBC Auto Differential [[id number]]  (Abnormal) Collected:  12/18/17 0537    Specimen:  Blood Updated:  12/18/17 0547     WBC 16.57 (H) 10*3/mm3      RBC 4.92 10*6/mm3      Hemoglobin 16.1 g/dL      Hematocrit 48.6 %      MCV 98.8 (H) fL      MCH 32.7 pg      MCHC 33.1 g/dL      RDW 13.1 %      RDW-SD 46.4 fl      MPV 9.8 fL      Platelets 121 (L) 10*3/mm3      Neutrophil % 91.2 (H) %      Lymphocyte % 4.2 (L) %      Monocyte % 4.3 %      Eosinophil % 0.0 %      Basophil % 0.1 %      Immature Grans % 0.2 %      Neutrophils, Absolute 15.11 (H) 10*3/mm3      Lymphocytes, Absolute 0.69 (L) 10*3/mm3      Monocytes, Absolute 0.72 10*3/mm3      Eosinophils, Absolute 0.00 10*3/mm3      Basophils, Absolute 0.01 10*3/mm3      Immature Grans, Absolute 0.04 (H) 10*3/mm3     Comprehensive Metabolic Panel [[id number]]  (Abnormal) Collected:  12/18/17 0537    Specimen:  Blood Updated:  12/18/17 0614     Glucose 124 (H) mg/dL      BUN 38 (H) mg/dL      Creatinine 1.13 mg/dL      Sodium 142 mmol/L      Potassium 4.0 mmol/L      Chloride 105 mmol/L      CO2 29.6 mmol/L      Calcium 8.6 mg/dL      Total Protein 6.4 g/dL      Albumin 3.70 g/dL      ALT (SGPT) 56 (H) U/L      AST (SGOT) 19 U/L      Alkaline Phosphatase 56 U/L       Note New Reference Ranges        Total Bilirubin 0.7 mg/dL      eGFR Non African Amer 64 mL/min/1.73      Globulin 2.7 gm/dL      A/G Ratio 1.4 (L) g/dL       BUN/Creatinine Ratio 33.6 (H)     Anion Gap 7.4 mmol/L     Osmolality, Calculated [[id number]]  (Normal) Collected:  12/18/17 0537    Specimen:  Blood Updated:  12/18/17 0614     Osmolality Calc 293.6 mOsm/kg     Troponin [[id number]]  (Abnormal) Collected:  12/18/17 0537    Specimen:  Blood Updated:  12/18/17 0626     Troponin I 0.177 (H) ng/mL     Narrative:       Ultra Troponin I Reference Range:         <=0.039 ng/mL: Negative    0.04-0.779 ng/mL: Indeterminate Range. Suspicious of MI.  Clinical correlation required.       >=0.78  ng/mL: Consistent with myocardial injury.  Clinical correlation required.          Xray:  Imaging Results (last 24 hours)     ** No results found for the last 24 hours. **             Results Review:     I reviewed the patient's new clinical results.    Medication Review:   Hospital Medications (active)       Dose Frequency Start End    albuterol (PROVENTIL) nebulizer solution 0.083% 2.5 mg/3mL 2.5 mg Every 6 Hours PRN 12/13/2017     Sig - Route: Take 2.5 mg by nebulization Every 6 (Six) Hours As Needed for Wheezing. - Nebulization    Notes to Pharmacy: Prior to [facility] Admission, Patient was on: 2 puffs every 4 to 6 hours as needed    aspirin EC tablet 81 mg 81 mg Daily 12/14/2017     Sig - Route: Take 1 tablet by mouth Daily. - Oral    diltiaZEM (CARDIZEM) 100 mg/100 mL (1 mg/mL) NS infusion (ADV) 10 mg/hr Titrated 12/15/2017     Sig - Route: Infuse 10 mg/hr into a venous catheter Dose Adjusted By Provider As Needed. - Intravenous    DOPamine 400 mg/250 mL (1.6 mg/mL) infusion 2-20 mcg/kg/min × 88.9 kg Titrated 12/15/2017     Sig - Route: Infuse 177.8-1,778 mcg/min into a venous catheter Dose Adjusted By Provider As Needed. - Intravenous    enoxaparin (LOVENOX) syringe 40 mg 40 mg Every 24 Hours 12/13/2017     Sig - Route: Inject 0.4 mL under the skin Daily. - Subcutaneous    famotidine (PEPCID) injection 20 mg 20 mg Every 12 Hours Scheduled 12/16/2017     Sig - Route: Infuse 2 mL into  a venous catheter Every 12 (Twelve) Hours. - Intravenous    FENTANYL PCA 1500 MCG/30 ML (BHCOR) PCA  Continuous 12/15/2017 12/25/2017    Sig - Route: Infuse  into a venous catheter Continuous. - Intravenous    furosemide (LASIX) injection 20 mg 20 mg Daily 12/16/2017     Sig - Route: Infuse 2 mL into a venous catheter Daily. - Intravenous    ipratropium-albuterol (DUO-NEB) nebulizer solution 3 mL 3 mL Every 4 Hours PRN 12/15/2017     Sig - Route: Take 3 mL by nebulization Every 4 (Four) Hours As Needed for Wheezing or Shortness of Air. - Nebulization    ipratropium-albuterol (DUO-NEB) nebulizer solution 3 mL 3 mL 4 Times Daily - RT 12/15/2017     Sig - Route: Take 3 mL by nebulization 4 (Four) Times a Day. - Nebulization    lisinopril (PRINIVIL,ZESTRIL) tablet 20 mg 20 mg Daily 12/14/2017     Sig - Route: Take 2 tablets by mouth Daily. - Oral    methylPREDNISolone sodium succinate (SOLU-Medrol) injection 40 mg 40 mg Every 12 Hours 12/15/2017     Sig - Route: Infuse 1 mL into a venous catheter Every 12 (Twelve) Hours. - Intravenous    metoprolol tartrate (LOPRESSOR) injection 5 mg 5 mg Every 4 Hours PRN 12/15/2017     Sig - Route: Infuse 5 mL into a venous catheter Every 4 (Four) Hours As Needed (For SBP > 160 and HR > 60). - Intravenous    norepinephrine (LEVOPHED) 8,000 mcg in sodium chloride 0.9 % 250 mL (32 mcg/mL) infusion 0.02-0.3 mcg/kg/min × 88.9 kg Titrated 12/15/2017     Sig - Route: Infuse 1.778-26.67 mcg/min into a venous catheter Dose Adjusted By Provider As Needed. - Intravenous    propofol (DIPRIVAN) infusion 10 mg/mL 100 mL 5-50 mcg/kg/min × 88.9 kg Titrated 12/15/2017     Sig - Route: Infuse 444.5-4,445 mcg/min into a venous catheter Dose Adjusted By Provider As Needed. - Intravenous    sodium chloride 0.9 % flush 1-10 mL 1-10 mL As Needed 12/13/2017     Sig - Route: Infuse 1-10 mL into a venous catheter As Needed for Line Care. - Intravenous    sodium chloride 0.9 % flush 10 mL 10 mL As Needed  "12/13/2017     Sig - Route: Infuse 10 mL into a venous catheter As Needed for Line Care. - Intravenous    Cosign for Ordering: Accepted by Kulwinder Cho MD on 12/13/2017  4:44 AM    Linked Group 1:  \"And\" Linked Group Details        sodium chloride 0.9 % flush 10 mL 10 mL Every 12 Hours Scheduled 12/15/2017     Sig - Route: 10 mL by Intracatheter route Every 12 (Twelve) Hours. - Intracatheter    Cosign for Ordering: Accepted by Nelson Ash MD on 12/17/2017  2:32 PM    sodium chloride 0.9 % flush 10 mL 10 mL As Needed 12/15/2017     Sig - Route: 10 mL by Intracatheter route As Needed for Line Care (After Medication Administration). - Intracatheter    Cosign for Ordering: Accepted by Nelson Ash MD on 12/17/2017  2:32 PM    spironolactone (ALDACTONE) tablet 25 mg 25 mg Daily 12/13/2017     Sig - Route: Take 1 tablet by mouth Daily. - Oral    vecuronium (NORCURON) injection 10 mg 10 mg Once 12/15/2017     Sig - Route: Infuse 10 mg into a venous catheter 1 (One) Time. - Intravenous          Assessment/Plan    1.Respiratory Failure: Multifactorial due to ?volume overload and/or COPD with underlying lung dz.  Pt diuresised 2300.  Resting on vent with sedation will continue current settings today.  ABG pending   2.COPD: continue monitor  3.Elevated Cardiac Enzymes/Cardiac arrhythmia: NSR rate 60's currently.  Trop 0.5, stress test neg for areas of ischemia.  Cardiology following appreciate input.  Continue diuresis.  4.Diastolic CHF: likely cause combination COPD and CHF of #1 with volume overload.  5.Hypokalemia: resolved with tx  6.Leukocytosis: cxr pending ?related to steroids      Tab Ash MD  12/18/17  7:24 AM      "

## 2017-12-18 NOTE — PLAN OF CARE
Problem: Patient Care Overview (Adult)  Goal: Plan of Care Review  Outcome: Ongoing (interventions implemented as appropriate)  Goal: Adult Individualization and Mutuality  Outcome: Ongoing (interventions implemented as appropriate)  Goal: Discharge Needs Assessment  Outcome: Ongoing (interventions implemented as appropriate)    Problem: Pain, Acute (Adult)  Goal: Acceptable Pain Control/Comfort Level  Outcome: Ongoing (interventions implemented as appropriate)    Problem: Skin Integrity Impairment, Risk/Actual (Adult)  Goal: Identify Related Risk Factors and Signs and Symptoms  Outcome: Ongoing (interventions implemented as appropriate)  Goal: Skin Integrity/Wound Healing  Outcome: Ongoing (interventions implemented as appropriate)    Problem: Respiratory Insufficiency (Adult)  Goal: Identify Related Risk Factors and Signs and Symptoms  Outcome: Ongoing (interventions implemented as appropriate)  Goal: Acid/Base Balance  Outcome: Ongoing (interventions implemented as appropriate)  Goal: Effective Ventilation  Outcome: Ongoing (interventions implemented as appropriate)    Problem: Pressure Ulcer Risk (Junior Scale) (Adult,Obstetrics,Pediatric)  Goal: Identify Related Risk Factors and Signs and Symptoms  Outcome: Ongoing (interventions implemented as appropriate)  Goal: Skin Integrity  Outcome: Ongoing (interventions implemented as appropriate)    Problem: Mechanical Ventilation, Invasive (Adult)  Goal: Signs and Symptoms of Listed Potential Problems Will be Absent or Manageable (Mechanical Ventilation, Invasive)  Outcome: Ongoing (interventions implemented as appropriate)

## 2017-12-18 NOTE — CONSULTS
INFECTIOUS DISEASE CONSULTATION REPORT      Referring Provider: Dr. Tab Ash  Reason for Consultation: Fever, leukocytosis      Principal problem: <principal problem not specified>    Subjective .     History of present illness:    As you well know Dr. Tab Ash, Mr. Salomón Rodriguez is a 72 y.o. years old male with past medical history significant for CHF, COPD, hypertension, MI, and stroke, who presented to [facility] Emergency Department on 12/13/2017 for COPD exacerbation with increased cough and congestion.  The patient eventually required intubation and continues to be intubated on the ventilator at 60% FiO2.  Spiked a fever today up to 101.4 with all cultures are no growth so far.  Worsening leukocytosis with CRP level less than 0.5 on 12/16/2017.  Chest x-ray from 12/18/2017 showing no consolidation/infiltrate.  Negative UA as of 12/18/2017.  2 peripheral IVs look okay.  Currently on steroid therapy.    Infectious Disease consultation was requested for antimicrobial management.     History taken from: chart family    Case was discussed with family and nursing staff    Review of Systems: Unable to obtain as the patient is intubated and sedated on the ventilator    Past Medical History    Past Medical History:   Diagnosis Date   • CHF (congestive heart failure)    • COPD (chronic obstructive pulmonary disease)    • Hypertension    • MI (myocardial infarction)    • Stroke        Past Surgical History    History reviewed. No pertinent surgical history.    Family History    History reviewed. No pertinent family history.    Social History    Social History   Substance Use Topics   • Smoking status: Former Smoker     Years: 55.00     Types: Cigarettes   • Smokeless tobacco: Current User      Comment: trying to quit   • Alcohol use No       Allergies    Review of patient's allergies indicates no known allergies.    Objective     /82 (BP Location: Right arm, Patient Position: Lying)  Pulse 88  " Temp (!) 101.4 °F (38.6 °C) (Axillary)  Comment: cool cloths applied; will notify MD  Resp 26  Ht 175.3 cm (69\")  Wt 88.9 kg (196 lb)  SpO2 91%  BMI 28.94 kg/m2    Temp:  [98.2 °F (36.8 °C)-101.4 °F (38.6 °C)] 101.4 °F (38.6 °C)        Intake/Output Summary (Last 24 hours) at 12/18/17 1713  Last data filed at 12/18/17 1400   Gross per 24 hour   Intake              812 ml   Output             3400 ml   Net            -2588 ml         Physical Exam:      General Appearance:   Sedated, intubated on the ventilator    Head:    Normocephalic, without obvious abnormality, atraumatic   Eyes:            Lids and lashes normal, conjunctivae and sclerae normal, no   icterus, no pallor, corneas clear, PERRLA   Ears:    Ears appear intact with no abnormalities noted   Throat:   No oral lesions, no thrush, oral mucosa moist   Neck:   No adenopathy, supple, trachea midline, no thyromegaly, no   carotid bruit, no JVD   Back:     No tenderness to percussion or palpation, range of motion   normal   Lungs:     Scattered wheezes and decreased breath sound     Heart:    Regular rhythm and normal rate, normal S1 and S2, no            murmur, no gallop, no rub, no click   Chest Wall:    No abnormalities observed   Abdomen:     Normal bowel sounds, no masses, no organomegaly, soft        non-tender, non-distended, no guarding, no rebound                tenderness   Rectal:     Deferred   Extremities:   Moves all extremities well, no edema, no cyanosis, no             redness   Pulses:   Pulses palpable and equal bilaterally   Skin:   No bleeding, bruising or rash.  Onychomycosis bilaterally to the feet.  2 peripheral IVs look okay.     Lymph nodes:   No palpable adenopathy   Neurologic:   Sedated on the ventilator        Results:      Results from last 7 days  Lab Units 12/18/17  0537 12/18/17  0049 12/17/17  0019 12/16/17  0544 12/15/17  0046 12/14/17  0029 12/13/17  0313   WBC 10*3/mm3 16.57* 18.06* 10.07 12.33 12.10 9.80 12.96* "     Lab Results   Component Value Date    NEUTROABS 15.11 (H) 12/18/2017         Results from last 7 days  Lab Units 12/18/17  0537   CREATININE mg/dL 1.13         Results from last 7 days  Lab Units 12/16/17  0544 12/15/17  0046   CRP mg/dL <0.50 <0.50       Imaging Results (last 24 hours)     Procedure Component Value Units Date/Time    XR Chest 1 View [[id number]] Collected:  12/18/17 1109     Updated:  12/18/17 1113    Narrative:       EXAMINATION: XR CHEST 1 VW-      CLINICAL INDICATION:     respiratory distress; R74.8-Abnormal levels of  other serum enzymes; J44.1-Chronic obstructive pulmonary disease with  (acute) exacerbation     TECHNIQUE:  XR CHEST 1 VW-      COMPARISON: 12/15/2017      FINDINGS:   Very small effusions are noted. Mild basilar atelectasis.   Heart size within normal limits.   ET tube with tip below level of clavicles and above the kimberley.   NG tube extends into stomach.   No pneumothorax.            Impression:       1. Satisfactory positioning of support devices.  2. Mild basilar atelectasis with trace pleural effusions noted.     This report was finalized on 12/18/2017 11:11 AM by Dr. Eliseo Kidd MD.               Cultures:    Blood Culture   Date Value Ref Range Status   12/13/2017 No growth at 5 days  Final   12/13/2017 No growth at 5 days  Final       Results Review:    I have personally reviewed laboratory data, culture results, radiology studies and antimicrobial therapy.    Hospital Medications (active)       Dose Frequency Start End    albuterol (PROVENTIL) nebulizer solution 0.083% 2.5 mg/3mL 2.5 mg Every 6 Hours PRN 12/13/2017     Sig - Route: Take 2.5 mg by nebulization Every 6 (Six) Hours As Needed for Wheezing. - Nebulization    Notes to Pharmacy: Prior to [facility] Admission, Patient was on: 2 puffs every 4 to 6 hours as needed    aspirin EC tablet 81 mg 81 mg Daily 12/14/2017     Sig - Route: Take 1 tablet by mouth Daily. - Oral    diltiaZEM (CARDIZEM) 100 mg/100 mL (1  mg/mL) NS infusion (ADV) 10 mg/hr Titrated 12/15/2017     Sig - Route: Infuse 10 mg/hr into a venous catheter Dose Adjusted By Provider As Needed. - Intravenous    DOPamine 400 mg/250 mL (1.6 mg/mL) infusion 2-20 mcg/kg/min × 88.9 kg Titrated 12/15/2017     Sig - Route: Infuse 177.8-1,778 mcg/min into a venous catheter Dose Adjusted By Provider As Needed. - Intravenous    enoxaparin (LOVENOX) syringe 40 mg 40 mg Every 24 Hours 12/13/2017     Sig - Route: Inject 0.4 mL under the skin Daily. - Subcutaneous    famotidine (PEPCID) injection 20 mg 20 mg Every 12 Hours Scheduled 12/16/2017     Sig - Route: Infuse 2 mL into a venous catheter Every 12 (Twelve) Hours. - Intravenous    FENTANYL PCA 1500 MCG/30 ML (BHCOR) PCA  Continuous 12/15/2017 12/25/2017    Sig - Route: Infuse  into a venous catheter Continuous. - Intravenous    furosemide (LASIX) injection 20 mg 20 mg Daily 12/16/2017     Sig - Route: Infuse 2 mL into a venous catheter Daily. - Intravenous    ipratropium-albuterol (DUO-NEB) nebulizer solution 3 mL 3 mL Every 4 Hours PRN 12/15/2017     Sig - Route: Take 3 mL by nebulization Every 4 (Four) Hours As Needed for Wheezing or Shortness of Air. - Nebulization    ipratropium-albuterol (DUO-NEB) nebulizer solution 3 mL 3 mL 4 Times Daily - RT 12/15/2017     Sig - Route: Take 3 mL by nebulization 4 (Four) Times a Day. - Nebulization    lisinopril (PRINIVIL,ZESTRIL) tablet 20 mg 20 mg Daily 12/14/2017     Sig - Route: Take 2 tablets by mouth Daily. - Oral    methylPREDNISolone sodium succinate (SOLU-Medrol) injection 40 mg 40 mg Every 12 Hours 12/15/2017     Sig - Route: Infuse 1 mL into a venous catheter Every 12 (Twelve) Hours. - Intravenous    metoprolol tartrate (LOPRESSOR) injection 5 mg 5 mg Every 4 Hours PRN 12/15/2017     Sig - Route: Infuse 5 mL into a venous catheter Every 4 (Four) Hours As Needed (For SBP > 160 and HR > 60). - Intravenous    norepinephrine (LEVOPHED) 8,000 mcg in sodium chloride 0.9 % 250  "mL (32 mcg/mL) infusion 0.02-0.3 mcg/kg/min × 88.9 kg Titrated 12/15/2017     Sig - Route: Infuse 1.778-26.67 mcg/min into a venous catheter Dose Adjusted By Provider As Needed. - Intravenous    propofol (DIPRIVAN) infusion 10 mg/mL 100 mL 5-50 mcg/kg/min × 88.9 kg Titrated 12/15/2017     Sig - Route: Infuse 444.5-4,445 mcg/min into a venous catheter Dose Adjusted By Provider As Needed. - Intravenous    sodium chloride 0.9 % flush 1-10 mL 1-10 mL As Needed 12/13/2017     Sig - Route: Infuse 1-10 mL into a venous catheter As Needed for Line Care. - Intravenous    sodium chloride 0.9 % flush 10 mL 10 mL As Needed 12/13/2017     Sig - Route: Infuse 10 mL into a venous catheter As Needed for Line Care. - Intravenous    Cosign for Ordering: Accepted by Kulwinder Cho MD on 12/13/2017  4:44 AM    Linked Group 1:  \"And\" Linked Group Details        sodium chloride 0.9 % flush 10 mL 10 mL Every 12 Hours Scheduled 12/15/2017     Sig - Route: 10 mL by Intracatheter route Every 12 (Twelve) Hours. - Intracatheter    Cosign for Ordering: Accepted by Nelson Ash MD on 12/17/2017  2:32 PM    sodium chloride 0.9 % flush 10 mL 10 mL As Needed 12/15/2017     Sig - Route: 10 mL by Intracatheter route As Needed for Line Care (After Medication Administration). - Intracatheter    Cosign for Ordering: Accepted by Nelson Ash MD on 12/17/2017  2:32 PM    spironolactone (ALDACTONE) tablet 25 mg 25 mg Daily 12/13/2017     Sig - Route: Take 1 tablet by mouth Daily. - Oral    vancomycin (VANCOCIN) 1,250 mg in sodium chloride 0.9 % 250 mL IVPB 1,250 mg Every 12 Hours 12/18/2017 12/28/2017    Sig - Route: Infuse 1,250 mg into a venous catheter Every 12 (Twelve) Hours. - Intravenous    vancomycin (VANCOCIN) 1,750 mg in sodium chloride 0.9 % 500 mL IVPB 1,750 mg Once 12/18/2017 12/18/2017    Sig - Route: Infuse 1,750 mg into a venous catheter 1 (One) Time. - Intravenous    vecuronium (NORCURON) injection 10 mg 10 mg Once 12/15/2017     " Sig - Route: Infuse 10 mg into a venous catheter 1 (One) Time. - Intravenous    Pharmacy to dose vancomycin (Discontinued)  Continuous PRN 12/18/2017 12/18/2017    Sig - Route: Continuous As Needed for Consult. - Does not apply    Reason for Discontinue: Dose adjustment              Assessment/Plan     ASSESSMENT:    1.  Severe sepsis with acute respiratory failure  2.  Aspiration pneumonia    PLAN:    The patient is at high risk for nosocomial organisms and aspiration anaerobic infection and antibiotic therapy was escalated to vancomycin and Zosyn with very close follow-up.  Respiratory culture ordered and CRP level ordered for a.m.  The patient carries a guarded prognosis.  Case discussed with daughter and grandson at bedside.      Patient's findings and recommendations were discussed with family and nursing staff    Code Status: Full Code    Trupti Xie PA-C  12/18/17  5:13 PM

## 2017-12-18 NOTE — PROGRESS NOTES
Pharmacy Kinetic Note  Vancomycin initiated for the treatment of pneumonia. Based on patient and population kinetic parameters, will dose vancomycin at 1750 loading dose followed by 1250mg IV q12h to target a trough 15-20mg/L. Will order trough when steady state is achieved.  Thank You,  Haylie GarrettD

## 2017-12-18 NOTE — SIGNIFICANT NOTE
Called Dr. LATHA Ash, on call for Dr. Macias, regarding patient's elevated temp.  Staff answered phone and stated that she would notify Dr. LATHA Ash and have him call me back.  Cool cloths have been applied.

## 2017-12-18 NOTE — PROGRESS NOTES
Discharge Planning Assessment  [facility]     Patient Name: Salomón Rodriguez  MRN: [id number]  Today's Date: 12/18/2017    Admit Date: 12/13/2017          Discharge Plan       12/18/17 1430    Case Management/Social Work Plan    Plan Pt is currently on the vent.  Pt lives at home with family and plans to return home at discharge.  Pt currently does not utilize home health and DME.  Pt utilizes [facility] on [address].  Pt's PCP is Dr. Macias.  SS will follow and assist with discharge needs.     Patient/Family In Agreement With Plan yes        Yamini Lucas

## 2017-12-18 NOTE — PROGRESS NOTES
"Reason for follow-up:  Chronic systolic CHF due to nonischemic cardiomyopathy    Subjective:    Patient is still intubated on mechanical ventilator.  No major cardiac events occurred the past 24 hours.  Heart rate-70 bpm and blood pressure is stable.  He is off dopamine infusion.      Medication Review:     aspirin 81 mg Oral Daily   enoxaparin 40 mg Subcutaneous Q24H   famotidine 20 mg Intravenous Q12H   furosemide 20 mg Intravenous Daily   ipratropium-albuterol 3 mL Nebulization 4x Daily - RT   lisinopril 20 mg Oral Daily   methylPREDNISolone sodium succinate 40 mg Intravenous Q12H   sodium chloride 10 mL Intracatheter Q12H   spironolactone 25 mg Oral Daily   vancomycin 1,250 mg Intravenous Q12H   vancomycin 1,750 mg Intravenous Once   vecuronium 10 mg Intravenous Once         Vital Sign Min/Max for last 24 hours  Temp  Min: 98.8 °F (37.1 °C)  Max: 100 °F (37.8 °C)   BP  Min: 119/59  Max: 142/80   Pulse  Min: 64  Max: 79   Resp  Min: 22  Max: 26   SpO2  Min: 90 %  Max: 94 %   Flow (L/min)  Min: 3  Max: 3   No Data Recorded     Flowsheet Rows         First Filed Value    Admission Height  167.6 cm (66\") Documented at 12/13/2017 0250    Admission Weight  88.5 kg (195 lb) Documented at 12/13/2017 0250          Physical Exam:     General Appearance:    Alert, cooperative, in no acute distress   Head:    Normocephalic, without obvious abnormality, atraumatic   Eyes:            Lids and lashes normal, conjunctivae and sclerae normal, no   icterus, no pallor, corneas clear, PERRLA   Ears:    Ears appear intact with no abnormalities noted   Throat:   No oral lesions, no thrush, oral mucosa moist   Neck:   No adenopathy, supple, trachea midline, no thyromegaly, no   carotid bruit, no JVD   Back:     No kyphosis present, no scoliosis present, no skin lesions,      erythema or scars, no tenderness to percussion or                   palpation,   range of motion normal   Lungs:     Clear to auscultation,respirations regular, " even and                  unlabored    Heart:    regular rhythm. Heart rate in 60s.  No murmurs   Chest Wall:    No abnormalities observed   Abdomen:     Normal bowel sounds, no masses, no organomegaly, soft        non-tender, non-distended, no guarding, no rebound                tenderness   Rectal:     Deferred   Extremities:   Moves all extremities well, no edema, no cyanosis, no             redness          Telemetry  Normal sinus rhythm        Labs    Results from last 7 days  Lab Units 12/18/17  0537 12/18/17  0049 12/17/17  0019 12/16/17  0544 12/15/17  0046 12/14/17  0029 12/13/17  0313   WBC 10*3/mm3 16.57* 18.06* 10.07 12.33 12.10 9.80 12.96*   HEMOGLOBIN g/dL 16.1 17.0 15.2 14.8 14.6 14.4 16.1   HEMATOCRIT % 48.6 51.4 45.6 45.4 45.2 43.5 47.8   PLATELETS 10*3/mm3 121* 137 154 167 134 144 186       Results from last 7 days  Lab Units 12/18/17  0537 12/17/17  0019 12/16/17  0544 12/15/17  0046 12/14/17  0029 12/13/17  0313   SODIUM mmol/L 142 139 141 137 136 140   POTASSIUM mmol/L 4.0 4.4 4.3 5.5* 4.4 4.2   CHLORIDE mmol/L 105 104 103 107 104 109   CO2 mmol/L 29.6 27.0 30.3 22.8* 23.7* 21.7*   BUN mg/dL 38* 35* 32* 23* 21 19   CREATININE mg/dL 1.13 1.15 1.21 0.98 1.05 0.97   CALCIUM mg/dL 8.6 8.6 8.7 9.4 9.3 8.6   GLUCOSE mg/dL 124* 110 102 108 122* 125*       Results from last 7 days  Lab Units 12/18/17  0537 12/17/17  0019 12/16/17  0544 12/13/17  0313   BILIRUBIN mg/dL 0.7 0.3 0.4 0.4   ALK PHOS U/L 56 54 57 75   AST (SGOT) U/L 19 27 40* 40*   ALT (SGPT) U/L 56* 90* 115* 41               Results from last 7 days  Lab Units 12/16/17  0544 12/15/17  0046   CRP mg/dL <0.50 <0.50       Results from last 7 days  Lab Units 12/18/17  0537 12/18/17  0049 12/17/17  1716  12/15/17  0046  12/13/17  0517   CK TOTAL U/L  --   --   --   --  135  --  65   TROPONIN I ng/mL 0.177* 0.208* 0.292*  < > 0.178*  < > 0.255*   CK MB INDEX %  --   --   --   --  5.9*  --  5.7*   MYOGLOBIN ng/mL  --   --   --   --  88.0  --   --     < > = values in this interval not displayed.    Results from last 7 days  Lab Units 12/18/17  0807   PH, ARTERIAL pH units 7.434   PO2 ART mm Hg 80.2   PCO2, ARTERIAL mm Hg 43.6   HCO3 ART mmol/L 28.6*         Assessment    1.  Chronic systoic heart failure due tonnischemi cardiomyopathy.  Ef . Clinically nd radiologicallycmpenated.   2. roponin I-indeterminae lilibeth. No evidence of MI.  Nuclear stress test showed no evidene of ishemia.    3. Acute on chronic respiratory ailure dueto exacerbation of COPD    Plan    1.  Continue Lasix 20 mg IV daily  2.  Continue lisinopril and spironolactone  3.  Beta blocker is on hold because of occurrence of bradycardia    .    Mann Romero MD  12/18/17  10:06 AM

## 2017-12-19 ENCOUNTER — APPOINTMENT (OUTPATIENT)
Dept: ULTRASOUND IMAGING | Facility: HOSPITAL | Age: 72
End: 2017-12-19

## 2017-12-19 LAB
ALBUMIN SERPL-MCNC: 3.6 G/DL (ref 3.4–4.8)
ALBUMIN/GLOB SERPL: 1.3 G/DL (ref 1.5–2.5)
ALP SERPL-CCNC: 63 U/L (ref 40–129)
ALT SERPL W P-5'-P-CCNC: 32 U/L (ref 10–44)
ANION GAP SERPL CALCULATED.3IONS-SCNC: 5 MMOL/L (ref 3.6–11.2)
AST SERPL-CCNC: 9 U/L (ref 10–34)
BASOPHILS # BLD AUTO: 0.01 10*3/MM3 (ref 0–0.3)
BASOPHILS NFR BLD AUTO: 0.1 % (ref 0–2)
BILIRUB SERPL-MCNC: 0.6 MG/DL (ref 0.2–1.8)
BUN BLD-MCNC: 50 MG/DL (ref 7–21)
BUN/CREAT SERPL: 32.9 (ref 7–25)
CALCIUM SPEC-SCNC: 9.1 MG/DL (ref 7.7–10)
CHLORIDE SERPL-SCNC: 108 MMOL/L (ref 99–112)
CO2 SERPL-SCNC: 30 MMOL/L (ref 24.3–31.9)
CREAT BLD-MCNC: 1.52 MG/DL (ref 0.43–1.29)
CREAT UR-MCNC: 88.1 MG/DL
CRP SERPL-MCNC: 31.29 MG/DL (ref 0–0.99)
DEPRECATED RDW RBC AUTO: 48 FL (ref 37–54)
EOSINOPHIL # BLD AUTO: 0.01 10*3/MM3 (ref 0–0.7)
EOSINOPHIL NFR BLD AUTO: 0.1 % (ref 0–7)
ERYTHROCYTE [DISTWIDTH] IN BLOOD BY AUTOMATED COUNT: 13.1 % (ref 11.5–14.5)
GFR SERPL CREATININE-BSD FRML MDRD: 46 ML/MIN/1.73
GLOBULIN UR ELPH-MCNC: 2.8 GM/DL
GLUCOSE BLD-MCNC: 169 MG/DL (ref 70–110)
HCT VFR BLD AUTO: 45.4 % (ref 42–52)
HGB BLD-MCNC: 14.8 G/DL (ref 14–18)
IMM GRANULOCYTES # BLD: 0.09 10*3/MM3 (ref 0–0.03)
IMM GRANULOCYTES NFR BLD: 0.5 % (ref 0–0.5)
LYMPHOCYTES # BLD AUTO: 0.65 10*3/MM3 (ref 1–3)
LYMPHOCYTES NFR BLD AUTO: 3.5 % (ref 16–46)
MCH RBC QN AUTO: 32.8 PG (ref 27–33)
MCHC RBC AUTO-ENTMCNC: 32.6 G/DL (ref 33–37)
MCV RBC AUTO: 100.7 FL (ref 80–94)
MONOCYTES # BLD AUTO: 0.92 10*3/MM3 (ref 0.1–0.9)
MONOCYTES NFR BLD AUTO: 4.9 % (ref 0–12)
NEUTROPHILS # BLD AUTO: 16.99 10*3/MM3 (ref 1.4–6.5)
NEUTROPHILS NFR BLD AUTO: 90.9 % (ref 40–75)
OSMOLALITY SERPL CALC.SUM OF ELEC: 302.2 MOSM/KG (ref 273–305)
PLATELET # BLD AUTO: 103 10*3/MM3 (ref 130–400)
PMV BLD AUTO: 10.1 FL (ref 6–10)
POTASSIUM BLD-SCNC: 4.2 MMOL/L (ref 3.5–5.3)
PROT SERPL-MCNC: 6.4 G/DL (ref 6–8)
RBC # BLD AUTO: 4.51 10*6/MM3 (ref 4.7–6.1)
SODIUM BLD-SCNC: 143 MMOL/L (ref 135–153)
SODIUM UR-SCNC: 47 MMOL/L
TROPONIN I SERPL-MCNC: 0.09 NG/ML
TROPONIN I SERPL-MCNC: 0.1 NG/ML
TROPONIN I SERPL-MCNC: 0.13 NG/ML
TROPONIN I SERPL-MCNC: 0.15 NG/ML
WBC NRBC COR # BLD: 18.67 10*3/MM3 (ref 4.5–12.5)

## 2017-12-19 PROCEDURE — 87070 CULTURE OTHR SPECIMN AEROBIC: CPT | Performed by: PHYSICIAN ASSISTANT

## 2017-12-19 PROCEDURE — 85025 COMPLETE CBC W/AUTO DIFF WBC: CPT | Performed by: FAMILY MEDICINE

## 2017-12-19 PROCEDURE — 87205 SMEAR GRAM STAIN: CPT | Performed by: PHYSICIAN ASSISTANT

## 2017-12-19 PROCEDURE — 82570 ASSAY OF URINE CREATININE: CPT | Performed by: INTERNAL MEDICINE

## 2017-12-19 PROCEDURE — 25010000002 VANCOMYCIN PER 500 MG

## 2017-12-19 PROCEDURE — 84484 ASSAY OF TROPONIN QUANT: CPT | Performed by: FAMILY MEDICINE

## 2017-12-19 PROCEDURE — 76775 US EXAM ABDO BACK WALL LIM: CPT

## 2017-12-19 PROCEDURE — 25010000002 ENOXAPARIN PER 10 MG: Performed by: FAMILY MEDICINE

## 2017-12-19 PROCEDURE — 25010000002 PIPERACILLIN-TAZOBACTAM: Performed by: PHYSICIAN ASSISTANT

## 2017-12-19 PROCEDURE — 86140 C-REACTIVE PROTEIN: CPT | Performed by: PHYSICIAN ASSISTANT

## 2017-12-19 PROCEDURE — 25010000002 METHYLPREDNISOLONE PER 40 MG: Performed by: NURSE PRACTITIONER

## 2017-12-19 PROCEDURE — 84300 ASSAY OF URINE SODIUM: CPT | Performed by: INTERNAL MEDICINE

## 2017-12-19 PROCEDURE — 80053 COMPREHEN METABOLIC PANEL: CPT | Performed by: FAMILY MEDICINE

## 2017-12-19 PROCEDURE — 94799 UNLISTED PULMONARY SVC/PX: CPT

## 2017-12-19 PROCEDURE — 87077 CULTURE AEROBIC IDENTIFY: CPT | Performed by: PHYSICIAN ASSISTANT

## 2017-12-19 PROCEDURE — 87186 SC STD MICRODIL/AGAR DIL: CPT | Performed by: PHYSICIAN ASSISTANT

## 2017-12-19 PROCEDURE — 94003 VENT MGMT INPAT SUBQ DAY: CPT

## 2017-12-19 PROCEDURE — 76775 US EXAM ABDO BACK WALL LIM: CPT | Performed by: RADIOLOGY

## 2017-12-19 PROCEDURE — 25010000002 PROPOFOL 1000 MG/ML EMULSION: Performed by: INTERNAL MEDICINE

## 2017-12-19 RX ORDER — SODIUM CHLORIDE 9 MG/ML
75 INJECTION, SOLUTION INTRAVENOUS ONCE
Status: COMPLETED | OUTPATIENT
Start: 2017-12-19 | End: 2017-12-19

## 2017-12-19 RX ADMIN — FAMOTIDINE 20 MG: 10 INJECTION INTRAVENOUS at 08:47

## 2017-12-19 RX ADMIN — PIPERACILLIN SODIUM,TAZOBACTAM SODIUM 3.38 G: 3; .375 INJECTION, POWDER, FOR SOLUTION INTRAVENOUS at 15:45

## 2017-12-19 RX ADMIN — SODIUM CHLORIDE 75 ML/HR: 9 INJECTION, SOLUTION INTRAVENOUS at 15:07

## 2017-12-19 RX ADMIN — VANCOMYCIN HYDROCHLORIDE 1250 MG: 5 INJECTION, POWDER, LYOPHILIZED, FOR SOLUTION INTRAVENOUS at 08:47

## 2017-12-19 RX ADMIN — PROPOFOL 45 MCG/KG/MIN: 10 INJECTION, EMULSION INTRAVENOUS at 01:19

## 2017-12-19 RX ADMIN — FAMOTIDINE 20 MG: 10 INJECTION INTRAVENOUS at 20:59

## 2017-12-19 RX ADMIN — IPRATROPIUM BROMIDE AND ALBUTEROL SULFATE 3 ML: .5; 3 SOLUTION RESPIRATORY (INHALATION) at 00:37

## 2017-12-19 RX ADMIN — IPRATROPIUM BROMIDE AND ALBUTEROL SULFATE 3 ML: .5; 3 SOLUTION RESPIRATORY (INHALATION) at 18:44

## 2017-12-19 RX ADMIN — ASPIRIN 81 MG: 81 TABLET ORAL at 08:48

## 2017-12-19 RX ADMIN — VANCOMYCIN HYDROCHLORIDE 1250 MG: 5 INJECTION, POWDER, LYOPHILIZED, FOR SOLUTION INTRAVENOUS at 21:09

## 2017-12-19 RX ADMIN — ENOXAPARIN SODIUM 40 MG: 40 INJECTION SUBCUTANEOUS at 08:47

## 2017-12-19 RX ADMIN — Medication 10 ML: at 20:59

## 2017-12-19 RX ADMIN — PROPOFOL 45 MCG/KG/MIN: 10 INJECTION, EMULSION INTRAVENOUS at 14:44

## 2017-12-19 RX ADMIN — PROPOFOL 45 MCG/KG/MIN: 10 INJECTION, EMULSION INTRAVENOUS at 18:25

## 2017-12-19 RX ADMIN — Medication 10 ML: at 08:59

## 2017-12-19 RX ADMIN — PROPOFOL 50 MCG/KG/MIN: 10 INJECTION, EMULSION INTRAVENOUS at 22:02

## 2017-12-19 RX ADMIN — IPRATROPIUM BROMIDE AND ALBUTEROL SULFATE 3 ML: .5; 3 SOLUTION RESPIRATORY (INHALATION) at 06:26

## 2017-12-19 RX ADMIN — METHYLPREDNISOLONE SODIUM SUCCINATE 40 MG: 125 INJECTION, POWDER, FOR SOLUTION INTRAMUSCULAR; INTRAVENOUS at 18:06

## 2017-12-19 RX ADMIN — IPRATROPIUM BROMIDE AND ALBUTEROL SULFATE 3 ML: .5; 3 SOLUTION RESPIRATORY (INHALATION) at 12:46

## 2017-12-19 RX ADMIN — PIPERACILLIN SODIUM,TAZOBACTAM SODIUM 3.38 G: 3; .375 INJECTION, POWDER, FOR SOLUTION INTRAVENOUS at 00:32

## 2017-12-19 RX ADMIN — PROPOFOL 45 MCG/KG/MIN: 10 INJECTION, EMULSION INTRAVENOUS at 04:44

## 2017-12-19 RX ADMIN — PIPERACILLIN SODIUM,TAZOBACTAM SODIUM 3.38 G: 3; .375 INJECTION, POWDER, FOR SOLUTION INTRAVENOUS at 08:46

## 2017-12-19 RX ADMIN — METHYLPREDNISOLONE SODIUM SUCCINATE 40 MG: 125 INJECTION, POWDER, FOR SOLUTION INTRAMUSCULAR; INTRAVENOUS at 05:39

## 2017-12-19 RX ADMIN — PROPOFOL 35 MCG/KG/MIN: 10 INJECTION, EMULSION INTRAVENOUS at 09:14

## 2017-12-19 NOTE — NURSING NOTE
[facility] ADMISSION NOTE:  Patient enrolled in the [facility] program to assist with education and support during transition home from hospital. [facility] home  will see patient at home within 48 hours of discharge and will follow up with patient in home and telephonically for 6 weeks post discharge under the [facility] Model.    Clinical Assessment Instrument Scores:  Short Portable Mental Status Questionnaire-   Geriatric Depression Scale-  Instrumental Activities of Daily Living-  CANTU Activities of Daily Living-  Overall Quality of Life-   Subjective Health Rating-   Symptom Bother Scale-  Generalized Anxiety Scale-  Health Literacy Test-     Screening tools not completed due to patient condition. Will be completed by home  at first home visit.

## 2017-12-19 NOTE — CONSULTS
Nephrology Consult Note    Referring Provider: Dr Ash  Reason for Consultation: ISABEL    Subjective       History of present illness:  Salomón Rodriguez is a 72 y.o. male who was admitted few days ago with COPD exacerbation. It did not respond to medical therapy and due to continued worsening he was intubated. He has severe sepsis with pneumonia from aspiration.  He is not able to provide any history and no family is present so it was obtained from chart. His baseline creatinine is 1.1 and it has worsened to 1.5 and hence the consult. He has been on lasix, lisinopril and spironolactone. BP is stable. He is on tube feeds and tolerating them. No diarrhea is reported. He is making urine. He has jose catheter with clear urine.       Acute Kidney Injury Risk Factors :    Medications : as above  Contrast : no  NSAIDS : no  Volume depletion : no  Hemodynamic Instability : no    History  Past Medical History:   Diagnosis Date   • CHF (congestive heart failure)    • COPD (chronic obstructive pulmonary disease)    • Hypertension    • MI (myocardial infarction)    • Stroke    , History reviewed. No pertinent surgical history., History reviewed. No pertinent family history., Social History   Substance Use Topics   • Smoking status: Former Smoker     Years: 55.00     Types: Cigarettes   • Smokeless tobacco: Current User      Comment: trying to quit   • Alcohol use No   , Prescriptions Prior to Admission   Medication Sig Dispense Refill Last Dose   • albuterol (PROVENTIL HFA;VENTOLIN HFA) 108 (90 Base) MCG/ACT inhaler Inhale 2 puffs Every 6 (Six) Hours As Needed for Wheezing. Prior to [facility] Admission, Patient was on: 2 puffs every 4 to 6 hours as needed   12/12/2017 at Unknown time   • aspirin 325 MG tablet Take 325 mg by mouth Daily.   12/12/2017 at Unknown time   • furosemide (LASIX) 20 MG tablet Take 20 mg by mouth Daily.   12/12/2017 at Unknown time   • lisinopril (PRINIVIL,ZESTRIL) 10 MG tablet Take 10 mg by mouth Daily.    12/12/2017 at Unknown time   • metoprolol succinate XL (TOPROL-XL) 50 MG 24 hr tablet Take 50 mg by mouth Daily.   12/12/2017 at Unknown time   • predniSONE (DELTASONE) 10 MG tablet Take 10 mg by mouth Daily As Needed (breathing). Prior to [facility] Admission, Patient was on: Filled on 12/05/17 for 10 day supply   12/12/2017 at Unknown time   • spironolactone (ALDACTONE) 25 MG tablet Take 25 mg by mouth Daily.   12/12/2017 at Unknown time   , Scheduled Meds:    aspirin 81 mg Oral Daily   enoxaparin 40 mg Subcutaneous Q24H   famotidine 20 mg Intravenous Q12H   furosemide 20 mg Intravenous Daily   ipratropium-albuterol 3 mL Nebulization 4x Daily - RT   methylPREDNISolone sodium succinate 40 mg Intravenous Q12H   piperacillin-tazobactam 3.375 g Intravenous Q8H   sodium chloride 10 mL Intracatheter Q12H   vancomycin 1,250 mg Intravenous Q12H   vecuronium 10 mg Intravenous Once   , Continuous Infusions:    diltiaZEM 10 mg/hr Last Rate: Stopped (12/15/17 0540)   DOPamine 2-20 mcg/kg/min Last Rate: Stopped (12/17/17 1506)   fentaNYL (SUBLIMAZE) PCA 1500 mcg/30 mL syringe     norepinephrine 0.02-0.3 mcg/kg/min Last Rate: Stopped (12/15/17 1107)   propofol 5-50 mcg/kg/min Last Rate: 45 mcg/kg/min (12/19/17 0444)   , PRN Meds:  •  acetaminophen  •  albuterol  •  ipratropium-albuterol  •  metoprolol tartrate  •  sodium chloride  •  Insert peripheral IV **AND** sodium chloride  •  sodium chloride and Allergies:  Review of patient's allergies indicates no known allergies.    Review of Systems  Not able to obtain    Objective     Vital Signs  Temp:  [98.8 °F (37.1 °C)-101.4 °F (38.6 °C)] 99 °F (37.2 °C)  Heart Rate:  [] 68  Resp:  [22-34] 23  BP: (120-168)/(61-90) 129/65  FiO2 (%):  [60 %] 60 %       I/O last 3 completed shifts:  In: 2328.5 [I.V.:877.5; NG/GT:751; IV Piggyback:700]  Out: 2390 [Urine:2390]    Physical Examination:    General Appearance : intubated and sedated  Head : normocephalic, without obvious abnormality  and atraumatic  Eyes : conjunctivae and sclerae normal, no icterus, no pallor and PERRLA  Throat : oral mucosa moist  Neck: no adenopathy, suppple, no carotid bruit and no JVD  Lungs : clear to auscultation, respirations regular and unlabored  Heart : regular rhythm & normal rate, normal S1, S2, no murmur, no ryland, no rub   Abdomen :  normal bowel sounds, no masses and soft non-tender  Rectal : Deferred  Extremities : no redness and no edema  Pulses :  palpable and equal bilaterally  Skin : no bleeding, bruising or rash  Neurologic : orientated to person, place, time, grossly no focal deficitis    Laboratory Data :      WBC WBC   Date Value Ref Range Status   12/19/2017 18.67 (H) 4.50 - 12.50 10*3/mm3 Final   12/18/2017 16.57 (H) 4.50 - 12.50 10*3/mm3 Final   12/18/2017 18.06 (H) 4.50 - 12.50 10*3/mm3 Final   12/17/2017 10.07 4.50 - 12.50 10*3/mm3 Final      HGB Hemoglobin   Date Value Ref Range Status   12/19/2017 14.8 14.0 - 18.0 g/dL Final   12/18/2017 16.1 14.0 - 18.0 g/dL Final   12/18/2017 17.0 14.0 - 18.0 g/dL Final   12/17/2017 15.2 14.0 - 18.0 g/dL Final      HCT Hematocrit   Date Value Ref Range Status   12/19/2017 45.4 42.0 - 52.0 % Final   12/18/2017 48.6 42.0 - 52.0 % Final   12/18/2017 51.4 42.0 - 52.0 % Final   12/17/2017 45.6 42.0 - 52.0 % Final      Platlets No results found for: LABPLAT   MCV MCV   Date Value Ref Range Status   12/19/2017 100.7 (H) 80.0 - 94.0 fL Final   12/18/2017 98.8 (H) 80.0 - 94.0 fL Final   12/18/2017 98.1 (H) 80.0 - 94.0 fL Final   12/17/2017 100.0 (H) 80.0 - 94.0 fL Final          Sodium Sodium   Date Value Ref Range Status   12/19/2017 143 135 - 153 mmol/L Final   12/18/2017 142 135 - 153 mmol/L Final   12/17/2017 139 135 - 153 mmol/L Final      Potassium Potassium   Date Value Ref Range Status   12/19/2017 4.2 3.5 - 5.3 mmol/L Final   12/18/2017 4.0 3.5 - 5.3 mmol/L Final   12/17/2017 4.4 3.5 - 5.3 mmol/L Final      Chloride Chloride   Date Value Ref Range Status    12/19/2017 108 99 - 112 mmol/L Final   12/18/2017 105 99 - 112 mmol/L Final   12/17/2017 104 99 - 112 mmol/L Final      CO2 CO2   Date Value Ref Range Status   12/19/2017 30.0 24.3 - 31.9 mmol/L Final   12/18/2017 29.6 24.3 - 31.9 mmol/L Final   12/17/2017 27.0 24.3 - 31.9 mmol/L Final      BUN BUN   Date Value Ref Range Status   12/19/2017 50 (H) 7 - 21 mg/dL Final   12/18/2017 38 (H) 7 - 21 mg/dL Final   12/17/2017 35 (H) 7 - 21 mg/dL Final      Creatinine Creatinine   Date Value Ref Range Status   12/19/2017 1.52 (H) 0.43 - 1.29 mg/dL Final   12/18/2017 1.13 0.43 - 1.29 mg/dL Final   12/17/2017 1.15 0.43 - 1.29 mg/dL Final      Calcium Calcium   Date Value Ref Range Status   12/19/2017 9.1 7.7 - 10.0 mg/dL Final   12/18/2017 8.6 7.7 - 10.0 mg/dL Final   12/17/2017 8.6 7.7 - 10.0 mg/dL Final      PO4 No results found for: CAPO4   Albumin Albumin   Date Value Ref Range Status   12/19/2017 3.60 3.40 - 4.80 g/dL Final   12/18/2017 3.70 3.40 - 4.80 g/dL Final   12/17/2017 3.70 3.40 - 4.80 g/dL Final      Magnesium No results found for: MG   Uric Acid No results found for: URICACID     Radiology results :     Imaging Results (last 72 hours)     Procedure Component Value Units Date/Time    XR Chest 1 View [[id number]] Collected:  12/18/17 1109     Updated:  12/18/17 1113    Narrative:       EXAMINATION: XR CHEST 1 VW-      CLINICAL INDICATION:     respiratory distress; R74.8-Abnormal levels of  other serum enzymes; J44.1-Chronic obstructive pulmonary disease with  (acute) exacerbation     TECHNIQUE:  XR CHEST 1 VW-      COMPARISON: 12/15/2017      FINDINGS:   Very small effusions are noted. Mild basilar atelectasis.   Heart size within normal limits.   ET tube with tip below level of clavicles and above the kimberley.   NG tube extends into stomach.   No pneumothorax.            Impression:       1. Satisfactory positioning of support devices.  2. Mild basilar atelectasis with trace pleural effusions noted.     This  report was finalized on 12/18/2017 11:11 AM by Dr. Eliseo Kidd MD.               Medications:        aspirin 81 mg Oral Daily   enoxaparin 40 mg Subcutaneous Q24H   famotidine 20 mg Intravenous Q12H   furosemide 20 mg Intravenous Daily   ipratropium-albuterol 3 mL Nebulization 4x Daily - RT   methylPREDNISolone sodium succinate 40 mg Intravenous Q12H   piperacillin-tazobactam 3.375 g Intravenous Q8H   sodium chloride 10 mL Intracatheter Q12H   vancomycin 1,250 mg Intravenous Q12H   vecuronium 10 mg Intravenous Once       diltiaZEM 10 mg/hr Last Rate: Stopped (12/15/17 0540)   DOPamine 2-20 mcg/kg/min Last Rate: Stopped (12/17/17 1506)   fentaNYL (SUBLIMAZE) PCA 1500 mcg/30 mL syringe     norepinephrine 0.02-0.3 mcg/kg/min Last Rate: Stopped (12/15/17 1107)   propofol 5-50 mcg/kg/min Last Rate: 45 mcg/kg/min (12/19/17 8307)       Assessment/Plan     Active Problems:    Elevated troponin      1. Non oliguric ISABEL : His baseline creatinine is 1.1 and it has worsened to 1.5 . ISABEL likely from nephrotoxic medications In setting of sepsis. I will dc lisinopril and spironolactone, check urine sodium. CHF appears compensated. He has trace effusions. I will give 1 L NS infusion and monitor. Avoid nephrotoxins    2. Respiratory failure    3. Systolic CHF     4. Sepsis/pneumonia    5. Copd exacerbation    Thanks Dr Ash for the consult. We will follow with you.  I discussed the patients findings and my recommendations with nursing staff    Salomón Griffin MD  12/19/17  8:08 AM

## 2017-12-19 NOTE — PROGRESS NOTES
Progress Note   12/19/17      Subjective     Interval History:     Complaints: Sedated on vent.  Pt develped fever 101.4 yest, blood cx x2, CXR neg for infiltrate but did have some atetelectsis, UA neg.  Izabela Tube feeds, has developed some small pressure sores on heels from pressure boots.  Wound care consulted.        Objective     Intake & Output (last day)       12/18 0701 - 12/19 0700    I.V. (mL/kg) 565.5 (6.4)    NG/    IV Piggyback 700    Total Intake(mL/kg) 2016.5 (22.7)    Urine (mL/kg/hr) 1580 (0.7)    Total Output 1580    Net +436.5               Vital Signs  Temp:  [98.2 °F (36.8 °C)-101.4 °F (38.6 °C)] 99 °F (37.2 °C)  Heart Rate:  [] 63  Resp:  [22-34] 25  BP: (120-168)/(61-90) 123/65  FiO2 (%):  [60 %] 60 %    Physical Exam:   General sedated on vent   Lungs dec bases less wheezing this am    Heart regular rhythm & normal rate   Abdomen +BS   Extremities no edema, bilateral ankle erythemous areas from pressure boots    Labs:  Lab Results (last 72 hours)     Procedure Component Value Units Date/Time    Comprehensive Metabolic Panel [[id number]]  (Abnormal) Collected:  12/16/17 0544    Specimen:  Blood Updated:  12/16/17 0647     Glucose 102 mg/dL      BUN 32 (H) mg/dL      Creatinine 1.21 mg/dL      Sodium 141 mmol/L      Potassium 4.3 mmol/L      Chloride 103 mmol/L      CO2 30.3 mmol/L      Calcium 8.7 mg/dL      Total Protein 6.3 g/dL      Albumin 3.80 g/dL      ALT (SGPT) 115 (H) U/L      AST (SGOT) 40 (H) U/L      Alkaline Phosphatase 57 U/L       Note New Reference Ranges        Total Bilirubin 0.4 mg/dL      eGFR Non African Amer 59 (L) mL/min/1.73      Globulin 2.5 gm/dL      A/G Ratio 1.5 g/dL      BUN/Creatinine Ratio 26.4 (H)     Anion Gap 7.7 mmol/L     Osmolality, Calculated [[id number]]  (Normal) Collected:  12/16/17 0544    Specimen:  Blood Updated:  12/16/17 0647     Osmolality Calc 288.4 mOsm/kg     C-reactive Protein [[id number]]  (Normal) Collected:  12/16/17 0544     Specimen:  Blood Updated:  12/16/17 0650     C-Reactive Protein <0.50 mg/dL     Troponin [[id number]]  (Abnormal) Collected:  12/16/17 0544    Specimen:  Blood Updated:  12/16/17 0700     Troponin I 0.506 (H) ng/mL     Narrative:       Ultra Troponin I Reference Range:         <=0.039 ng/mL: Negative    0.04-0.779 ng/mL: Indeterminate Range. Suspicious of MI.  Clinical correlation required.       >=0.78  ng/mL: Consistent with myocardial injury.  Clinical correlation required.    Troponin [[id number]]  (Abnormal) Collected:  12/16/17 1216    Specimen:  Blood Updated:  12/16/17 1301     Troponin I 0.431 (H) ng/mL     Narrative:       Ultra Troponin I Reference Range:         <=0.039 ng/mL: Negative    0.04-0.779 ng/mL: Indeterminate Range. Suspicious of MI.  Clinical correlation required.       >=0.78  ng/mL: Consistent with myocardial injury.  Clinical correlation required.    Troponin [[id number]]  (Abnormal) Collected:  12/16/17 1824    Specimen:  Blood Updated:  12/16/17 1922     Troponin I 0.388 (H) ng/mL     Narrative:       Ultra Troponin I Reference Range:         <=0.039 ng/mL: Negative    0.04-0.779 ng/mL: Indeterminate Range. Suspicious of MI.  Clinical correlation required.       >=0.78  ng/mL: Consistent with myocardial injury.  Clinical correlation required.    CBC & Differential [[id number]] Collected:  12/17/17 0019    Specimen:  Blood Updated:  12/17/17 0130    Narrative:       The following orders were created for panel order CBC & Differential.  Procedure                               Abnormality         Status                     ---------                               -----------         ------                     CBC Auto Differential[[id number]]        Abnormal            Final result                 Please view results for these tests on the individual orders.    CBC Auto Differential [[id number]]  (Abnormal) Collected:  12/17/17 0019    Specimen:  Blood Updated:  12/17/17 0130     WBC 10.07  10*3/mm3      RBC 4.56 (L) 10*6/mm3      Hemoglobin 15.2 g/dL      Hematocrit 45.6 %      .0 (H) fL      MCH 33.3 (H) pg      MCHC 33.3 g/dL      RDW 13.2 %      RDW-SD 47.4 fl      MPV 10.2 (H) fL      Platelets 154 10*3/mm3      Neutrophil % 89.2 (H) %      Lymphocyte % 5.8 (L) %      Monocyte % 4.9 %      Eosinophil % 0.0 %      Basophil % 0.0 %      Immature Grans % 0.1 %      Neutrophils, Absolute 8.99 (H) 10*3/mm3      Lymphocytes, Absolute 0.58 (L) 10*3/mm3      Monocytes, Absolute 0.49 10*3/mm3      Eosinophils, Absolute 0.00 10*3/mm3      Basophils, Absolute 0.00 10*3/mm3      Immature Grans, Absolute 0.01 10*3/mm3     Comprehensive Metabolic Panel [[id number]]  (Abnormal) Collected:  12/17/17 0019    Specimen:  Blood Updated:  12/17/17 0156     Glucose 110 mg/dL      BUN 35 (H) mg/dL      Creatinine 1.15 mg/dL      Sodium 139 mmol/L      Potassium 4.4 mmol/L      Chloride 104 mmol/L      CO2 27.0 mmol/L      Calcium 8.6 mg/dL      Total Protein 6.4 g/dL      Albumin 3.70 g/dL      ALT (SGPT) 90 (H) U/L      AST (SGOT) 27 U/L      Alkaline Phosphatase 54 U/L       Note New Reference Ranges        Total Bilirubin 0.3 mg/dL      eGFR Non African Amer 63 mL/min/1.73      Globulin 2.7 gm/dL      A/G Ratio 1.4 (L) g/dL      BUN/Creatinine Ratio 30.4 (H)     Anion Gap 8.0 mmol/L     Osmolality, Calculated [[id number]]  (Normal) Collected:  12/17/17 0019    Specimen:  Blood Updated:  12/17/17 0156     Osmolality Calc 286.2 mOsm/kg     Troponin [[id number]]  (Abnormal) Collected:  12/17/17 0019    Specimen:  Blood Updated:  12/17/17 0206     Troponin I 0.237 (H) ng/mL     Narrative:       Ultra Troponin I Reference Range:         <=0.039 ng/mL: Negative    0.04-0.779 ng/mL: Indeterminate Range. Suspicious of MI.  Clinical correlation required.       >=0.78  ng/mL: Consistent with myocardial injury.  Clinical correlation required.    Troponin [[id number]]  (Abnormal) Collected:  12/17/17 0516    Specimen:  Blood  Updated:  12/17/17 0611     Troponin I 0.246 (H) ng/mL     Narrative:       Ultra Troponin I Reference Range:         <=0.039 ng/mL: Negative    0.04-0.779 ng/mL: Indeterminate Range. Suspicious of MI.  Clinical correlation required.       >=0.78  ng/mL: Consistent with myocardial injury.  Clinical correlation required.    Blood Gas, Arterial [[id number]]  (Abnormal) Collected:  12/17/17 0728    Specimen:  Arterial Blood Updated:  12/17/17 0740     Site Arterial: right radial     Ten's Test Positive     pH, Arterial 7.377 pH units      pCO2, Arterial 50.6 (C) mm Hg      pO2, Arterial 69.0 (L) mm Hg      HCO3, Arterial 29.1 (H) mmol/L      Base Excess, Arterial 2.7 mmol/L      O2 Saturation, Arterial 93.0 %      Hemoglobin, Blood Gas 16.9 (H) g/dL      Hematocrit, Blood Gas 50.0 %      Oxyhemoglobin 92.0 %      Methemoglobin 0.40 %      Carboxyhemoglobin 0.7 %      A-a Gradiant 283.3 mmHg      Temperature 98.6 C      Barometric Pressure for Blood Gas 727 mmHg      Modality Ventilator     FIO2 60 %      Ventilator Mode ac     Set Tidal Volume 450     Set Mech Resp Rate 22     PEEP 5    Troponin [[id number]]  (Abnormal) Collected:  12/17/17 1154    Specimen:  Blood Updated:  12/17/17 1259     Troponin I 0.247 (H) ng/mL     Narrative:       Ultra Troponin I Reference Range:         <=0.039 ng/mL: Negative    0.04-0.779 ng/mL: Indeterminate Range. Suspicious of MI.  Clinical correlation required.       >=0.78  ng/mL: Consistent with myocardial injury.  Clinical correlation required.    Troponin [[id number]]  (Abnormal) Collected:  12/17/17 1716    Specimen:  Blood Updated:  12/17/17 1812     Troponin I 0.292 (H) ng/mL     Narrative:       Ultra Troponin I Reference Range:         <=0.039 ng/mL: Negative    0.04-0.779 ng/mL: Indeterminate Range. Suspicious of MI.  Clinical correlation required.       >=0.78  ng/mL: Consistent with myocardial injury.  Clinical correlation required.    CBC & Differential [[id number]]  Collected:  12/18/17 0049    Specimen:  Blood Updated:  12/18/17 0131    Narrative:       The following orders were created for panel order CBC & Differential.  Procedure                               Abnormality         Status                     ---------                               -----------         ------                     CBC Auto Differential[[id number]]        Abnormal            Final result                 Please view results for these tests on the individual orders.    CBC Auto Differential [[id number]]  (Abnormal) Collected:  12/18/17 0049    Specimen:  Blood Updated:  12/18/17 0131     WBC 18.06 (H) 10*3/mm3      RBC 5.24 10*6/mm3      Hemoglobin 17.0 g/dL      Hematocrit 51.4 %      MCV 98.1 (H) fL      MCH 32.4 pg      MCHC 33.1 g/dL      RDW 13.2 %      RDW-SD 47.2 fl      MPV 10.2 (H) fL      Platelets 137 10*3/mm3      Neutrophil % 92.1 (H) %      Lymphocyte % 2.9 (L) %      Monocyte % 4.7 %      Eosinophil % 0.1 %      Basophil % 0.0 %      Immature Grans % 0.2 %      Neutrophils, Absolute 16.64 (H) 10*3/mm3      Lymphocytes, Absolute 0.52 (L) 10*3/mm3      Monocytes, Absolute 0.85 10*3/mm3      Eosinophils, Absolute 0.01 10*3/mm3      Basophils, Absolute 0.00 10*3/mm3      Immature Grans, Absolute 0.04 (H) 10*3/mm3     Troponin [[id number]]  (Abnormal) Collected:  12/18/17 0049    Specimen:  Blood Updated:  12/18/17 0144     Troponin I 0.208 (H) ng/mL     Narrative:       Ultra Troponin I Reference Range:         <=0.039 ng/mL: Negative    0.04-0.779 ng/mL: Indeterminate Range. Suspicious of MI.  Clinical correlation required.       >=0.78  ng/mL: Consistent with myocardial injury.  Clinical correlation required.    Blood Culture - Blood, [[id number]]  (Normal) Collected:  12/13/17 0313    Specimen:  Blood from Arm, Left Updated:  12/18/17 0331     Blood Culture No growth at 5 days    Blood Culture - Blood, [[id number]]  (Normal) Collected:  12/13/17 0348    Specimen:  Blood from Arm, Left  Updated:  12/18/17 0401     Blood Culture No growth at 5 days    CBC & Differential [[id number]] Collected:  12/18/17 0537    Specimen:  Blood Updated:  12/18/17 0547    Narrative:       The following orders were created for panel order CBC & Differential.  Procedure                               Abnormality         Status                     ---------                               -----------         ------                     CBC Auto Differential[[id number]]        Abnormal            Final result                 Please view results for these tests on the individual orders.    CBC Auto Differential [[id number]]  (Abnormal) Collected:  12/18/17 0537    Specimen:  Blood Updated:  12/18/17 0547     WBC 16.57 (H) 10*3/mm3      RBC 4.92 10*6/mm3      Hemoglobin 16.1 g/dL      Hematocrit 48.6 %      MCV 98.8 (H) fL      MCH 32.7 pg      MCHC 33.1 g/dL      RDW 13.1 %      RDW-SD 46.4 fl      MPV 9.8 fL      Platelets 121 (L) 10*3/mm3      Neutrophil % 91.2 (H) %      Lymphocyte % 4.2 (L) %      Monocyte % 4.3 %      Eosinophil % 0.0 %      Basophil % 0.1 %      Immature Grans % 0.2 %      Neutrophils, Absolute 15.11 (H) 10*3/mm3      Lymphocytes, Absolute 0.69 (L) 10*3/mm3      Monocytes, Absolute 0.72 10*3/mm3      Eosinophils, Absolute 0.00 10*3/mm3      Basophils, Absolute 0.01 10*3/mm3      Immature Grans, Absolute 0.04 (H) 10*3/mm3     Comprehensive Metabolic Panel [[id number]]  (Abnormal) Collected:  12/18/17 0537    Specimen:  Blood Updated:  12/18/17 0614     Glucose 124 (H) mg/dL      BUN 38 (H) mg/dL      Creatinine 1.13 mg/dL      Sodium 142 mmol/L      Potassium 4.0 mmol/L      Chloride 105 mmol/L      CO2 29.6 mmol/L      Calcium 8.6 mg/dL      Total Protein 6.4 g/dL      Albumin 3.70 g/dL      ALT (SGPT) 56 (H) U/L      AST (SGOT) 19 U/L      Alkaline Phosphatase 56 U/L       Note New Reference Ranges        Total Bilirubin 0.7 mg/dL      eGFR Non African Amer 64 mL/min/1.73      Globulin 2.7 gm/dL      A/G  Ratio 1.4 (L) g/dL      BUN/Creatinine Ratio 33.6 (H)     Anion Gap 7.4 mmol/L     Osmolality, Calculated [[id number]]  (Normal) Collected:  12/18/17 0537    Specimen:  Blood Updated:  12/18/17 0614     Osmolality Calc 293.6 mOsm/kg     Troponin [[id number]]  (Abnormal) Collected:  12/18/17 0537    Specimen:  Blood Updated:  12/18/17 0626     Troponin I 0.177 (H) ng/mL     Narrative:       Ultra Troponin I Reference Range:         <=0.039 ng/mL: Negative    0.04-0.779 ng/mL: Indeterminate Range. Suspicious of MI.  Clinical correlation required.       >=0.78  ng/mL: Consistent with myocardial injury.  Clinical correlation required.    Blood Gas, Arterial [[id number]]  (Abnormal) Collected:  12/18/17 0807    Specimen:  Arterial Blood Updated:  12/18/17 0833     Site Arterial: right radial     Ten's Test Positive     pH, Arterial 7.434 pH units      pCO2, Arterial 43.6 mm Hg      pO2, Arterial 80.2 mm Hg      HCO3, Arterial 28.6 (H) mmol/L      Base Excess, Arterial 3.7 mmol/L      O2 Saturation, Arterial 96.4 %      Hemoglobin, Blood Gas 16.8 (H) g/dL      Hematocrit, Blood Gas 49.0 %      Oxyhemoglobin 95.2 %      Methemoglobin 0.50 %      Carboxyhemoglobin 0.7 %      A-a Gradiant 279.8 mmHg      Temperature 98.6 C      Barometric Pressure for Blood Gas 727 mmHg      Modality Ventilator     FIO2 60 %      Ventilator Mode ac     Set Tidal Volume 450     Set Mech Resp Rate 22     PEEP 5    Urinalysis With / Culture If Indicated - Urine, Clean Catch [[id number]]  (Normal) Collected:  12/18/17 0852    Specimen:  Urine from Urine, Catheter Updated:  12/18/17 0937     Color, UA Yellow     Appearance, UA Clear     pH, UA <=5.0     Specific Gravity, UA 1.021     Glucose, UA Negative     Ketones, UA Negative     Bilirubin, UA Negative     Blood, UA Negative     Protein, UA Negative     Leuk Esterase, UA Negative     Nitrite, UA Negative     Urobilinogen, UA 0.2 E.U./dL    Narrative:       Urine microscopic not indicated.     Troponin [[id number]]  (Abnormal) Collected:  12/18/17 1204    Specimen:  Blood Updated:  12/18/17 1300     Troponin I 0.192 (H) ng/mL     Narrative:       Ultra Troponin I Reference Range:         <=0.039 ng/mL: Negative    0.04-0.779 ng/mL: Indeterminate Range. Suspicious of MI.  Clinical correlation required.       >=0.78  ng/mL: Consistent with myocardial injury.  Clinical correlation required.    Troponin [[id number]]  (Abnormal) Collected:  12/18/17 1721    Specimen:  Blood Updated:  12/18/17 1818     Troponin I 0.215 (H) ng/mL     Narrative:       Ultra Troponin I Reference Range:         <=0.039 ng/mL: Negative    0.04-0.779 ng/mL: Indeterminate Range. Suspicious of MI.  Clinical correlation required.       >=0.78  ng/mL: Consistent with myocardial injury.  Clinical correlation required.    Mycoplasma Pneumoniae Antibody, IgM [[id number]]  (Normal) Collected:  12/18/17 1727    Specimen:  Blood Updated:  12/18/17 1855     Mycoplasma pneumo IgM Negative    Legionella Antigen, Urine - Urine, Clean Catch [[id number]]  (Normal) Collected:  12/18/17 1755    Specimen:  Urine from Urine, Catheter Updated:  12/18/17 1937     LEGIONELLA ANTIGEN, URINE Negative    Narrative:         Presumptive negative for L. pneumophilia serogroup 1 antigen, suggesting no recent or current infection.    Troponin [[id number]]  (Abnormal) Collected:  12/19/17 0036    Specimen:  Blood Updated:  12/19/17 0154     Troponin I 0.147 (H) ng/mL     Narrative:       Ultra Troponin I Reference Range:         <=0.039 ng/mL: Negative    0.04-0.779 ng/mL: Indeterminate Range. Suspicious of MI.  Clinical correlation required.       >=0.78  ng/mL: Consistent with myocardial injury.  Clinical correlation required.    C-reactive Protein [[id number]]  (Abnormal) Collected:  12/19/17 0036    Specimen:  Blood Updated:  12/19/17 0157     C-Reactive Protein 31.29 (H) mg/dL     Blood Culture - Blood, [[id number]]  (Normal) Collected:  12/18/17 1533    Specimen:   Blood from Arm, Right Updated:  12/19/17 0431     Blood Culture No growth at less than 24 hours    Blood Culture - Blood, [[id number]]  (Normal) Collected:  12/18/17 1611    Specimen:  Blood from Arm, Right Updated:  12/19/17 0431     Blood Culture No growth at less than 24 hours    CBC & Differential [[id number]] Collected:  12/19/17 0517    Specimen:  Blood Updated:  12/19/17 0528    Narrative:       The following orders were created for panel order CBC & Differential.  Procedure                               Abnormality         Status                     ---------                               -----------         ------                     CBC Auto Differential[[id number]]        Abnormal            Final result                 Please view results for these tests on the individual orders.    CBC Auto Differential [[id number]]  (Abnormal) Collected:  12/19/17 0517    Specimen:  Blood Updated:  12/19/17 0528     WBC 18.67 (H) 10*3/mm3      RBC 4.51 (L) 10*6/mm3      Hemoglobin 14.8 g/dL      Hematocrit 45.4 %      .7 (H) fL      MCH 32.8 pg      MCHC 32.6 (L) g/dL      RDW 13.1 %      RDW-SD 48.0 fl      MPV 10.1 (H) fL      Platelets 103 (L) 10*3/mm3      Neutrophil % 90.9 (H) %      Lymphocyte % 3.5 (L) %      Monocyte % 4.9 %      Eosinophil % 0.1 %      Basophil % 0.1 %      Immature Grans % 0.5 %      Neutrophils, Absolute 16.99 (H) 10*3/mm3      Lymphocytes, Absolute 0.65 (L) 10*3/mm3      Monocytes, Absolute 0.92 (H) 10*3/mm3      Eosinophils, Absolute 0.01 10*3/mm3      Basophils, Absolute 0.01 10*3/mm3      Immature Grans, Absolute 0.09 (H) 10*3/mm3     Comprehensive Metabolic Panel [[id number]]  (Abnormal) Collected:  12/19/17 0517    Specimen:  Blood Updated:  12/19/17 0553     Glucose 169 (H) mg/dL      BUN 50 (H) mg/dL      Creatinine 1.52 (H) mg/dL      Sodium 143 mmol/L      Potassium 4.2 mmol/L      Chloride 108 mmol/L      CO2 30.0 mmol/L      Calcium 9.1 mg/dL      Total Protein 6.4 g/dL       Albumin 3.60 g/dL      ALT (SGPT) 32 U/L      AST (SGOT) 9 (L) U/L      Alkaline Phosphatase 63 U/L       Note New Reference Ranges        Total Bilirubin 0.6 mg/dL      eGFR Non African Amer 46 (L) mL/min/1.73      Globulin 2.8 gm/dL      A/G Ratio 1.3 (L) g/dL      BUN/Creatinine Ratio 32.9 (H)     Anion Gap 5.0 mmol/L     Osmolality, Calculated [[id number]]  (Normal) Collected:  12/19/17 0517    Specimen:  Blood Updated:  12/19/17 0553     Osmolality Calc 302.2 mOsm/kg     Troponin [[id number]]  (Abnormal) Collected:  12/19/17 0517    Specimen:  Blood Updated:  12/19/17 0615     Troponin I 0.131 (H) ng/mL     Narrative:       Ultra Troponin I Reference Range:         <=0.039 ng/mL: Negative    0.04-0.779 ng/mL: Indeterminate Range. Suspicious of MI.  Clinical correlation required.       >=0.78  ng/mL: Consistent with myocardial injury.  Clinical correlation required.          Xray:  Imaging Results (last 24 hours)     Procedure Component Value Units Date/Time    XR Chest 1 View [[id number]] Collected:  12/18/17 1109     Updated:  12/18/17 1113    Narrative:       EXAMINATION: XR CHEST 1 VW-      CLINICAL INDICATION:     respiratory distress; R74.8-Abnormal levels of  other serum enzymes; J44.1-Chronic obstructive pulmonary disease with  (acute) exacerbation     TECHNIQUE:  XR CHEST 1 VW-      COMPARISON: 12/15/2017      FINDINGS:   Very small effusions are noted. Mild basilar atelectasis.   Heart size within normal limits.   ET tube with tip below level of clavicles and above the kimberley.   NG tube extends into stomach.   No pneumothorax.            Impression:       1. Satisfactory positioning of support devices.  2. Mild basilar atelectasis with trace pleural effusions noted.     This report was finalized on 12/18/2017 11:11 AM by Dr. Eliseo Kidd MD.                Results Review:     I reviewed the patient's new clinical results.    Medication Review:   Hospital Medications (active)       Dose Frequency Start  End    acetaminophen (TYLENOL) 160 MG/5ML solution 650 mg 650 mg Every 6 Hours PRN 12/18/2017     Sig - Route: 20.3 mL by Per G Tube route Every 6 (Six) Hours As Needed for Mild Pain , Moderate Pain  or Fever. - Per G Tube    albuterol (PROVENTIL) nebulizer solution 0.083% 2.5 mg/3mL 2.5 mg Every 6 Hours PRN 12/13/2017     Sig - Route: Take 2.5 mg by nebulization Every 6 (Six) Hours As Needed for Wheezing. - Nebulization    Notes to Pharmacy: Prior to [facility] Admission, Patient was on: 2 puffs every 4 to 6 hours as needed    aspirin EC tablet 81 mg 81 mg Daily 12/14/2017     Sig - Route: Take 1 tablet by mouth Daily. - Oral    diltiaZEM (CARDIZEM) 100 mg/100 mL (1 mg/mL) NS infusion (ADV) 10 mg/hr Titrated 12/15/2017     Sig - Route: Infuse 10 mg/hr into a venous catheter Dose Adjusted By Provider As Needed. - Intravenous    DOPamine 400 mg/250 mL (1.6 mg/mL) infusion 2-20 mcg/kg/min × 88.9 kg Titrated 12/15/2017     Sig - Route: Infuse 177.8-1,778 mcg/min into a venous catheter Dose Adjusted By Provider As Needed. - Intravenous    enoxaparin (LOVENOX) syringe 40 mg 40 mg Every 24 Hours 12/13/2017     Sig - Route: Inject 0.4 mL under the skin Daily. - Subcutaneous    famotidine (PEPCID) injection 20 mg 20 mg Every 12 Hours Scheduled 12/16/2017     Sig - Route: Infuse 2 mL into a venous catheter Every 12 (Twelve) Hours. - Intravenous    FENTANYL PCA 1500 MCG/30 ML (BHCOR) PCA  Continuous 12/15/2017 12/25/2017    Sig - Route: Infuse  into a venous catheter Continuous. - Intravenous    furosemide (LASIX) injection 20 mg 20 mg Daily 12/16/2017     Sig - Route: Infuse 2 mL into a venous catheter Daily. - Intravenous    ipratropium-albuterol (DUO-NEB) nebulizer solution 3 mL 3 mL Every 4 Hours PRN 12/15/2017     Sig - Route: Take 3 mL by nebulization Every 4 (Four) Hours As Needed for Wheezing or Shortness of Air. - Nebulization    ipratropium-albuterol (DUO-NEB) nebulizer solution 3 mL 3 mL 4 Times Daily - RT 12/15/2017      Sig - Route: Take 3 mL by nebulization 4 (Four) Times a Day. - Nebulization    lisinopril (PRINIVIL,ZESTRIL) tablet 20 mg 20 mg Daily 12/14/2017     Sig - Route: Take 2 tablets by mouth Daily. - Oral    methylPREDNISolone sodium succinate (SOLU-Medrol) injection 40 mg 40 mg Every 12 Hours 12/15/2017     Sig - Route: Infuse 1 mL into a venous catheter Every 12 (Twelve) Hours. - Intravenous    metoprolol tartrate (LOPRESSOR) injection 5 mg 5 mg Every 4 Hours PRN 12/15/2017     Sig - Route: Infuse 5 mL into a venous catheter Every 4 (Four) Hours As Needed (For SBP > 160 and HR > 60). - Intravenous    norepinephrine (LEVOPHED) 8,000 mcg in sodium chloride 0.9 % 250 mL (32 mcg/mL) infusion 0.02-0.3 mcg/kg/min × 88.9 kg Titrated 12/15/2017     Sig - Route: Infuse 1.778-26.67 mcg/min into a venous catheter Dose Adjusted By Provider As Needed. - Intravenous    piperacillin-tazobactam (ZOSYN) 3.375 g/100 mL 0.9% NS IVPB (mbp) 3.375 g Once 12/18/2017 12/18/2017    Sig - Route: Infuse 100 mL into a venous catheter 1 (One) Time. - Intravenous    Cosign for Ordering: Required by Angel Cortez MD    piperacillin-tazobactam (ZOSYN) 3.375 g/100 mL 0.9% NS IVPB (mbp) 3.375 g Every 8 Hours 12/19/2017 12/25/2017    Sig - Route: Infuse 100 mL into a venous catheter Every 8 (Eight) Hours. - Intravenous    Cosign for Ordering: Required by Angel Cortez MD    propofol (DIPRIVAN) infusion 10 mg/mL 100 mL 5-50 mcg/kg/min × 88.9 kg Titrated 12/15/2017     Sig - Route: Infuse 444.5-4,445 mcg/min into a venous catheter Dose Adjusted By Provider As Needed. - Intravenous    sodium chloride 0.9 % flush 1-10 mL 1-10 mL As Needed 12/13/2017     Sig - Route: Infuse 1-10 mL into a venous catheter As Needed for Line Care. - Intravenous    sodium chloride 0.9 % flush 10 mL 10 mL As Needed 12/13/2017     Sig - Route: Infuse 10 mL into a venous catheter As Needed for Line Care. - Intravenous    Cosign for Ordering: Accepted by Kulwinder CONCEPCION  "MD Marquis on 12/13/2017  4:44 AM    Linked Group 1:  \"And\" Linked Group Details        sodium chloride 0.9 % flush 10 mL 10 mL Every 12 Hours Scheduled 12/15/2017     Sig - Route: 10 mL by Intracatheter route Every 12 (Twelve) Hours. - Intracatheter    Cosign for Ordering: Accepted by Nelson Ash MD on 12/17/2017  2:32 PM    sodium chloride 0.9 % flush 10 mL 10 mL As Needed 12/15/2017     Sig - Route: 10 mL by Intracatheter route As Needed for Line Care (After Medication Administration). - Intracatheter    Cosign for Ordering: Accepted by Nelson Ash MD on 12/17/2017  2:32 PM    spironolactone (ALDACTONE) tablet 25 mg 25 mg Daily 12/13/2017     Sig - Route: Take 1 tablet by mouth Daily. - Oral    vancomycin (VANCOCIN) 1,250 mg in sodium chloride 0.9 % 250 mL IVPB 1,250 mg Every 12 Hours 12/18/2017 12/28/2017    Sig - Route: Infuse 1,250 mg into a venous catheter Every 12 (Twelve) Hours. - Intravenous    vancomycin (VANCOCIN) 1,750 mg in sodium chloride 0.9 % 500 mL IVPB 1,750 mg Once 12/18/2017 12/18/2017    Sig - Route: Infuse 1,750 mg into a venous catheter 1 (One) Time. - Intravenous    vecuronium (NORCURON) injection 10 mg 10 mg Once 12/15/2017     Sig - Route: Infuse 10 mg into a venous catheter 1 (One) Time. - Intravenous    Pharmacy to dose vancomycin (Discontinued)  Continuous PRN 12/18/2017 12/18/2017    Sig - Route: Continuous As Needed for Consult. - Does not apply    Reason for Discontinue: Dose adjustment          Assessment/Plan    1. Sepsis: Continue meds per ID. On zosyn and vanc. No obvious source continue pressure supports with levophed  2. Respiratory failure: sedated on vent ABG noted continue current tx.  Pt with multifactorial Diastolic CHF with COPD continue supportative care  3. Decubitus ulcers heels bilateral: wound care consult continue nursing care  4.CAD: troponin indeterminated age trended down, stress test no evidence of ischemia cards following.  5.ACute Renal Insuffiency CR " 1.52 with GFR 46 today, Cr 1.13 with GFR 64 yest.  Consult cardiology for management of fluids in light of Diastolic CHF      Tab Ash MD  12/19/17  6:29 AM

## 2017-12-19 NOTE — PROGRESS NOTES
"  I have personally seen and examined the patient today and discussed overnight interval progress and pertinent issues with nursing staff.    Subjective:    Fever trend showing improvement.  On her bedside in case discussed at length with her.  CRP level significantly worsen at 31.29 with persistently worsening leukocytosis.  Negative Legionella urinary antigen and negative mycoplasma IgM.  We'll recheck his CRP level in the morning as CRP level could be related to recent intubation.      History taken from: chart family RN      Vital Signs    /75  Pulse 76  Temp 98.4 °F (36.9 °C) (Oral)   Resp 28  Ht 175.3 cm (69\")  Wt 88.9 kg (196 lb)  SpO2 98%  BMI 28.94 kg/m2    Temp:  [98.4 °F (36.9 °C)-101.4 °F (38.6 °C)] 98.4 °F (36.9 °C)      Intake/Output Summary (Last 24 hours) at 12/19/17 1051  Last data filed at 12/19/17 0900   Gross per 24 hour   Intake          1866.45 ml   Output             1055 ml   Net           811.45 ml     Intake & Output (last 3 days)       12/16 0701 - 12/17 0700 12/17 0701 - 12/18 0700 12/18 0701 - 12/19 0700 12/19 0701 - 12/20 0700    I.V. (mL/kg) 142 (1.6) 677 (7.6) 565.5 (6.4)     NG/GT   751     IV Piggyback   700 350    Total Intake(mL/kg) 142 (1.6) 677 (7.6) 2016.5 (22.7) 350 (3.9)    Urine (mL/kg/hr) 3330 (1.6) 2360 (1.1) 1580 (0.7) 175 (0.5)    Total Output 3330 2360 1580 175    Net -3188 -1683 +436.5 +175                  Physical Exam:       General Appearance:   Sedated, intubated on the ventilator    Head:    Normocephalic, without obvious abnormality, atraumatic   Eyes:            Lids and lashes normal, conjunctivae and sclerae normal, no   icterus, no pallor, corneas clear, PERRLA   Ears:    Ears appear intact with no abnormalities noted   Throat:   No oral lesions, no thrush, oral mucosa moist   Neck:   No adenopathy, supple, trachea midline, no thyromegaly, no   carotid bruit, no JVD   Back:     No tenderness to percussion or palpation, range of motion   normal "   Lungs:     Scattered wheezes and decreased breath sound     Heart:    Regular rhythm and normal rate, normal S1 and S2, no            murmur, no gallop, no rub, no click   Chest Wall:    No abnormalities observed   Abdomen:     Normal bowel sounds, no masses, no organomegaly, soft        non-tender, non-distended, no guarding, no rebound                tenderness   Rectal:     Deferred   Extremities:   Moves all extremities well, no edema, no cyanosis, no             redness   Pulses:   Pulses palpable and equal bilaterally   Skin:   No bleeding, bruising or rash.  Onychomycosis bilaterally to the feet.  2 peripheral IVs look okay.     Lymph nodes:   No palpable adenopathy   Neurologic:   Sedated on the ventilator          Results:      Results from last 7 days  Lab Units 12/19/17  0517 12/18/17  0537 12/18/17  0049 12/17/17  0019 12/16/17  0544 12/15/17  0046 12/14/17  0029   WBC 10*3/mm3 18.67* 16.57* 18.06* 10.07 12.33 12.10 9.80     Lab Results   Component Value Date    NEUTROABS 16.99 (H) 12/19/2017         Results from last 7 days  Lab Units 12/19/17  0517   CREATININE mg/dL 1.52*         Results from last 7 days  Lab Units 12/19/17  0036 12/16/17  0544 12/15/17  0046   CRP mg/dL 31.29* <0.50 <0.50       Imaging Results (last 24 hours)     Procedure Component Value Units Date/Time    XR Chest 1 View [[id number]] Collected:  12/18/17 1109     Updated:  12/18/17 1113    Narrative:       EXAMINATION: XR CHEST 1 VW-      CLINICAL INDICATION:     respiratory distress; R74.8-Abnormal levels of  other serum enzymes; J44.1-Chronic obstructive pulmonary disease with  (acute) exacerbation     TECHNIQUE:  XR CHEST 1 VW-      COMPARISON: 12/15/2017      FINDINGS:   Very small effusions are noted. Mild basilar atelectasis.   Heart size within normal limits.   ET tube with tip below level of clavicles and above the kimberley.   NG tube extends into stomach.   No pneumothorax.            Impression:       1. Satisfactory  positioning of support devices.  2. Mild basilar atelectasis with trace pleural effusions noted.     This report was finalized on 12/18/2017 11:11 AM by Dr. Eliseo Kidd MD.               Results Review:    I have personally reviewed laboratory data, culture results, radiology studies and antimicrobial therapy.    Hospital Medications (active)       Dose Frequency Start End    acetaminophen (TYLENOL) 160 MG/5ML solution 650 mg 650 mg Every 6 Hours PRN 12/18/2017     Sig - Route: 20.3 mL by Per G Tube route Every 6 (Six) Hours As Needed for Mild Pain , Moderate Pain  or Fever. - Per G Tube    albuterol (PROVENTIL) nebulizer solution 0.083% 2.5 mg/3mL 2.5 mg Every 6 Hours PRN 12/13/2017     Sig - Route: Take 2.5 mg by nebulization Every 6 (Six) Hours As Needed for Wheezing. - Nebulization    Notes to Pharmacy: Prior to [facility] [place], operated by [facility] Admission, Patient was on: 2 puffs every 4 to 6 hours as needed    aspirin EC tablet 81 mg 81 mg Daily 12/14/2017     Sig - Route: Take 1 tablet by mouth Daily. - Oral    diltiaZEM (CARDIZEM) 100 mg/100 mL (1 mg/mL) NS infusion (ADV) 10 mg/hr Titrated 12/15/2017     Sig - Route: Infuse 10 mg/hr into a venous catheter Dose Adjusted By Provider As Needed. - Intravenous    DOPamine 400 mg/250 mL (1.6 mg/mL) infusion 2-20 mcg/kg/min × 88.9 kg Titrated 12/15/2017     Sig - Route: Infuse 177.8-1,778 mcg/min into a venous catheter Dose Adjusted By Provider As Needed. - Intravenous    enoxaparin (LOVENOX) syringe 40 mg 40 mg Every 24 Hours 12/13/2017     Sig - Route: Inject 0.4 mL under the skin Daily. - Subcutaneous    famotidine (PEPCID) injection 20 mg 20 mg Every 12 Hours Scheduled 12/16/2017     Sig - Route: Infuse 2 mL into a venous catheter Every 12 (Twelve) Hours. - Intravenous    FENTANYL PCA 1500 MCG/30 ML (BHCOR) PCA  Continuous 12/15/2017 12/25/2017    Sig - Route: Infuse  into a venous catheter Continuous. - Intravenous    ipratropium-albuterol (DUO-NEB) nebulizer solution 3 mL 3 mL  Every 4 Hours PRN 12/15/2017     Sig - Route: Take 3 mL by nebulization Every 4 (Four) Hours As Needed for Wheezing or Shortness of Air. - Nebulization    ipratropium-albuterol (DUO-NEB) nebulizer solution 3 mL 3 mL 4 Times Daily - RT 12/15/2017     Sig - Route: Take 3 mL by nebulization 4 (Four) Times a Day. - Nebulization    methylPREDNISolone sodium succinate (SOLU-Medrol) injection 40 mg 40 mg Every 12 Hours 12/15/2017     Sig - Route: Infuse 1 mL into a venous catheter Every 12 (Twelve) Hours. - Intravenous    metoprolol tartrate (LOPRESSOR) injection 5 mg 5 mg Every 4 Hours PRN 12/15/2017     Sig - Route: Infuse 5 mL into a venous catheter Every 4 (Four) Hours As Needed (For SBP > 160 and HR > 60). - Intravenous    norepinephrine (LEVOPHED) 8,000 mcg in sodium chloride 0.9 % 250 mL (32 mcg/mL) infusion 0.02-0.3 mcg/kg/min × 88.9 kg Titrated 12/15/2017     Sig - Route: Infuse 1.778-26.67 mcg/min into a venous catheter Dose Adjusted By Provider As Needed. - Intravenous    piperacillin-tazobactam (ZOSYN) 3.375 g/100 mL 0.9% NS IVPB (mbp) 3.375 g Once 12/18/2017 12/18/2017    Sig - Route: Infuse 100 mL into a venous catheter 1 (One) Time. - Intravenous    Cosign for Ordering: Required by Angel Cortez MD    piperacillin-tazobactam (ZOSYN) 3.375 g/100 mL 0.9% NS IVPB (mbp) 3.375 g Every 8 Hours 12/19/2017 12/25/2017    Sig - Route: Infuse 100 mL into a venous catheter Every 8 (Eight) Hours. - Intravenous    Cosign for Ordering: Required by Angel Cortez MD    propofol (DIPRIVAN) infusion 10 mg/mL 100 mL 5-50 mcg/kg/min × 88.9 kg Titrated 12/15/2017     Sig - Route: Infuse 444.5-4,445 mcg/min into a venous catheter Dose Adjusted By Provider As Needed. - Intravenous    sodium chloride 0.9 % flush 1-10 mL 1-10 mL As Needed 12/13/2017     Sig - Route: Infuse 1-10 mL into a venous catheter As Needed for Line Care. - Intravenous    sodium chloride 0.9 % flush 10 mL 10 mL As Needed 12/13/2017     Sig - Route:  "Infuse 10 mL into a venous catheter As Needed for Line Care. - Intravenous    Cosign for Ordering: Accepted by Kulwinder Cho MD on 12/13/2017  4:44 AM    Linked Group 1:  \"And\" Linked Group Details        sodium chloride 0.9 % flush 10 mL 10 mL Every 12 Hours Scheduled 12/15/2017     Sig - Route: 10 mL by Intracatheter route Every 12 (Twelve) Hours. - Intracatheter    Cosign for Ordering: Accepted by Nelson Ash MD on 12/17/2017  2:32 PM    sodium chloride 0.9 % flush 10 mL 10 mL As Needed 12/15/2017     Sig - Route: 10 mL by Intracatheter route As Needed for Line Care (After Medication Administration). - Intracatheter    Cosign for Ordering: Accepted by Nelson Ash MD on 12/17/2017  2:32 PM    vancomycin (VANCOCIN) 1,250 mg in sodium chloride 0.9 % 250 mL IVPB 1,250 mg Every 12 Hours 12/18/2017 12/28/2017    Sig - Route: Infuse 1,250 mg into a venous catheter Every 12 (Twelve) Hours. - Intravenous    vancomycin (VANCOCIN) 1,750 mg in sodium chloride 0.9 % 500 mL IVPB 1,750 mg Once 12/18/2017 12/18/2017    Sig - Route: Infuse 1,750 mg into a venous catheter 1 (One) Time. - Intravenous    vecuronium (NORCURON) injection 10 mg 10 mg Once 12/15/2017     Sig - Route: Infuse 10 mg into a venous catheter 1 (One) Time. - Intravenous    furosemide (LASIX) injection 20 mg (Discontinued) 20 mg Daily 12/16/2017 12/19/2017    Sig - Route: Infuse 2 mL into a venous catheter Daily. - Intravenous    lisinopril (PRINIVIL,ZESTRIL) tablet 20 mg (Discontinued) 20 mg Daily 12/14/2017 12/19/2017    Sig - Route: Take 2 tablets by mouth Daily. - Oral    spironolactone (ALDACTONE) tablet 25 mg (Discontinued) 25 mg Daily 12/13/2017 12/19/2017    Sig - Route: Take 1 tablet by mouth Daily. - Oral            Cultures:    Blood Culture   Date Value Ref Range Status   12/18/2017 No growth at less than 24 hours  Preliminary   12/18/2017 No growth at less than 24 hours  Preliminary   12/13/2017 No growth at 5 days  Final   12/13/2017 " No growth at 5 days  Final           Assessment/Plan     ASSESSMENT:    1.  Severe sepsis with acute respiratory failure  2.  Aspiration pneumonia     PLAN:    Fever trend showing improvement.  On her bedside in case discussed at length with her.  CRP level significantly worsen at 31.29 with persistently worsening leukocytosis.  Negative Legionella urinary antigen and negative mycoplasma IgM.  We'll recheck his CRP level in the morning as CRP level could be related to recent intubation.  We will recheck CRP level in a.m. and continue to follow closely.     The patient is at high risk for nosocomial organisms and aspiration anaerobic infection and antibiotic therapy was escalated to vancomycin and Zosyn on 12/18/2017 with very close follow-up.  The patient carries a guarded prognosis.     Patient's findings and recommendations were discussed with family and nursing staff    Code Status: Full Code    Trupti Xie PA-C  12/19/17  10:51 AM

## 2017-12-19 NOTE — PLAN OF CARE
Problem: Patient Care Overview (Adult)  Goal: Plan of Care Review  Outcome: Ongoing (interventions implemented as appropriate)  Goal: Adult Individualization and Mutuality  Outcome: Ongoing (interventions implemented as appropriate)    Problem: Cardiac Output, Decreased (Adult)  Goal: Adequate Cardiac Output/Effective Tissue Perfusion  Outcome: Ongoing (interventions implemented as appropriate)    Problem: Skin Integrity Impairment, Risk/Actual (Adult)  Goal: Identify Related Risk Factors and Signs and Symptoms  Outcome: Ongoing (interventions implemented as appropriate)  Goal: Skin Integrity/Wound Healing  Outcome: Ongoing (interventions implemented as appropriate)    Problem: Respiratory Insufficiency (Adult)  Goal: Identify Related Risk Factors and Signs and Symptoms  Outcome: Ongoing (interventions implemented as appropriate)  Goal: Acid/Base Balance  Outcome: Ongoing (interventions implemented as appropriate)  Goal: Effective Ventilation  Outcome: Ongoing (interventions implemented as appropriate)    Problem: Pressure Ulcer Risk (Junior Scale) (Adult,Obstetrics,Pediatric)  Goal: Identify Related Risk Factors and Signs and Symptoms  Outcome: Ongoing (interventions implemented as appropriate)  Goal: Skin Integrity  Outcome: Ongoing (interventions implemented as appropriate)    Problem: Mechanical Ventilation, Invasive (Adult)  Goal: Signs and Symptoms of Listed Potential Problems Will be Absent or Manageable (Mechanical Ventilation, Invasive)  Outcome: Ongoing (interventions implemented as appropriate)    Problem: Pressure Ulcer (Adult)  Goal: Signs and Symptoms of Listed Potential Problems Will be Absent or Manageable (Pressure Ulcer)  Outcome: Ongoing (interventions implemented as appropriate)

## 2017-12-20 ENCOUNTER — APPOINTMENT (OUTPATIENT)
Dept: GENERAL RADIOLOGY | Facility: HOSPITAL | Age: 72
End: 2017-12-20

## 2017-12-20 ENCOUNTER — HOSPITAL ENCOUNTER (OUTPATIENT)
Facility: HOSPITAL | Age: 72
Discharge: ANOTHER HOSPITAL | End: 2017-12-27
Attending: INTERNAL MEDICINE | Admitting: INTERNAL MEDICINE

## 2017-12-20 VITALS
SYSTOLIC BLOOD PRESSURE: 149 MMHG | HEIGHT: 69 IN | HEART RATE: 70 BPM | BODY MASS INDEX: 27.85 KG/M2 | OXYGEN SATURATION: 95 % | WEIGHT: 188 LBS | DIASTOLIC BLOOD PRESSURE: 72 MMHG | RESPIRATION RATE: 25 BRPM | TEMPERATURE: 97.4 F

## 2017-12-20 VITALS — OXYGEN SATURATION: 98 %

## 2017-12-20 LAB
ALBUMIN SERPL-MCNC: 3.5 G/DL (ref 3.4–4.8)
ALBUMIN SERPL-MCNC: 3.8 G/DL (ref 3.4–4.8)
ALBUMIN/GLOB SERPL: 1.2 G/DL (ref 1.5–2.5)
ALBUMIN/GLOB SERPL: 1.3 G/DL (ref 1.5–2.5)
ALP SERPL-CCNC: 114 U/L (ref 40–129)
ALP SERPL-CCNC: 85 U/L (ref 40–129)
ALT SERPL W P-5'-P-CCNC: 37 U/L (ref 10–44)
ALT SERPL W P-5'-P-CCNC: 45 U/L (ref 10–44)
ANION GAP SERPL CALCULATED.3IONS-SCNC: 4.5 MMOL/L (ref 3.6–11.2)
ANION GAP SERPL CALCULATED.3IONS-SCNC: 5.6 MMOL/L (ref 3.6–11.2)
AST SERPL-CCNC: 26 U/L (ref 10–34)
AST SERPL-CCNC: 33 U/L (ref 10–34)
BASOPHILS # BLD AUTO: 0.01 10*3/MM3 (ref 0–0.3)
BASOPHILS NFR BLD AUTO: 0.1 % (ref 0–2)
BILIRUB SERPL-MCNC: 0.5 MG/DL (ref 0.2–1.8)
BILIRUB SERPL-MCNC: 0.6 MG/DL (ref 0.2–1.8)
BUN BLD-MCNC: 35 MG/DL (ref 7–21)
BUN BLD-MCNC: 39 MG/DL (ref 7–21)
BUN/CREAT SERPL: 34 (ref 7–25)
BUN/CREAT SERPL: 38.6 (ref 7–25)
CALCIUM SPEC-SCNC: 9.2 MG/DL (ref 7.7–10)
CALCIUM SPEC-SCNC: 9.8 MG/DL (ref 7.7–10)
CHLORIDE SERPL-SCNC: 108 MMOL/L (ref 99–112)
CHLORIDE SERPL-SCNC: 111 MMOL/L (ref 99–112)
CO2 SERPL-SCNC: 29.5 MMOL/L (ref 24.3–31.9)
CO2 SERPL-SCNC: 30.4 MMOL/L (ref 24.3–31.9)
CREAT BLD-MCNC: 1.01 MG/DL (ref 0.43–1.29)
CREAT BLD-MCNC: 1.03 MG/DL (ref 0.43–1.29)
CRP SERPL-MCNC: 30.13 MG/DL (ref 0–0.99)
DEPRECATED RDW RBC AUTO: 48.4 FL (ref 37–54)
EOSINOPHIL # BLD AUTO: 0 10*3/MM3 (ref 0–0.7)
EOSINOPHIL NFR BLD AUTO: 0 % (ref 0–7)
ERYTHROCYTE [DISTWIDTH] IN BLOOD BY AUTOMATED COUNT: 13.1 % (ref 11.5–14.5)
GFR SERPL CREATININE-BSD FRML MDRD: 71 ML/MIN/1.73
GFR SERPL CREATININE-BSD FRML MDRD: 73 ML/MIN/1.73
GLOBULIN UR ELPH-MCNC: 2.8 GM/DL
GLOBULIN UR ELPH-MCNC: 3.1 GM/DL
GLUCOSE BLD-MCNC: 164 MG/DL (ref 70–110)
GLUCOSE BLD-MCNC: 195 MG/DL (ref 70–110)
HCT VFR BLD AUTO: 45.2 % (ref 42–52)
HGB BLD-MCNC: 14.5 G/DL (ref 14–18)
IMM GRANULOCYTES # BLD: 0.15 10*3/MM3 (ref 0–0.03)
IMM GRANULOCYTES NFR BLD: 0.9 % (ref 0–0.5)
LYMPHOCYTES # BLD AUTO: 0.38 10*3/MM3 (ref 1–3)
LYMPHOCYTES NFR BLD AUTO: 2.2 % (ref 16–46)
MACROCYTES BLD QL SMEAR: NORMAL
MCH RBC QN AUTO: 32.4 PG (ref 27–33)
MCHC RBC AUTO-ENTMCNC: 32.1 G/DL (ref 33–37)
MCV RBC AUTO: 101.1 FL (ref 80–94)
MONOCYTES # BLD AUTO: 1.69 10*3/MM3 (ref 0.1–0.9)
MONOCYTES NFR BLD AUTO: 10 % (ref 0–12)
NEUTROPHILS # BLD AUTO: 14.75 10*3/MM3 (ref 1.4–6.5)
NEUTROPHILS NFR BLD AUTO: 86.8 % (ref 40–75)
OSMOLALITY SERPL CALC.SUM OF ELEC: 300.2 MOSM/KG (ref 273–305)
OSMOLALITY SERPL CALC.SUM OF ELEC: 301.7 MOSM/KG (ref 273–305)
PLATELET # BLD AUTO: 123 10*3/MM3 (ref 130–400)
PMV BLD AUTO: 10.4 FL (ref 6–10)
POTASSIUM BLD-SCNC: 4.8 MMOL/L (ref 3.5–5.3)
POTASSIUM BLD-SCNC: 5 MMOL/L (ref 3.5–5.3)
PROT SERPL-MCNC: 6.3 G/DL (ref 6–8)
PROT SERPL-MCNC: 6.9 G/DL (ref 6–8)
RBC # BLD AUTO: 4.47 10*6/MM3 (ref 4.7–6.1)
SMALL PLATELETS BLD QL SMEAR: NORMAL
SODIUM BLD-SCNC: 144 MMOL/L (ref 135–153)
SODIUM BLD-SCNC: 145 MMOL/L (ref 135–153)
TROPONIN I SERPL-MCNC: 0.08 NG/ML
TROPONIN I SERPL-MCNC: 0.1 NG/ML
TROPONIN I SERPL-MCNC: 0.11 NG/ML
TROPONIN I SERPL-MCNC: 0.11 NG/ML
VANCOMYCIN TROUGH SERPL-MCNC: 21.2 MCG/ML (ref 5–15)
WBC NRBC COR # BLD: 16.98 10*3/MM3 (ref 4.5–12.5)

## 2017-12-20 PROCEDURE — 25010000002 VANCOMYCIN PER 500 MG: Performed by: INTERNAL MEDICINE

## 2017-12-20 PROCEDURE — 71010 HC CHEST PA OR AP: CPT

## 2017-12-20 PROCEDURE — 85025 COMPLETE CBC W/AUTO DIFF WBC: CPT | Performed by: FAMILY MEDICINE

## 2017-12-20 PROCEDURE — 94799 UNLISTED PULMONARY SVC/PX: CPT

## 2017-12-20 PROCEDURE — 80053 COMPREHEN METABOLIC PANEL: CPT | Performed by: FAMILY MEDICINE

## 2017-12-20 PROCEDURE — 84484 ASSAY OF TROPONIN QUANT: CPT | Performed by: FAMILY MEDICINE

## 2017-12-20 PROCEDURE — 25010000002 ENOXAPARIN PER 10 MG: Performed by: FAMILY MEDICINE

## 2017-12-20 PROCEDURE — 87205 SMEAR GRAM STAIN: CPT | Performed by: INTERNAL MEDICINE

## 2017-12-20 PROCEDURE — 80053 COMPREHEN METABOLIC PANEL: CPT | Performed by: INTERNAL MEDICINE

## 2017-12-20 PROCEDURE — 93005 ELECTROCARDIOGRAM TRACING: CPT | Performed by: INTERNAL MEDICINE

## 2017-12-20 PROCEDURE — 87070 CULTURE OTHR SPECIMN AEROBIC: CPT | Performed by: INTERNAL MEDICINE

## 2017-12-20 PROCEDURE — 87186 SC STD MICRODIL/AGAR DIL: CPT | Performed by: INTERNAL MEDICINE

## 2017-12-20 PROCEDURE — 25010000002 PROPOFOL 1000 MG/ML EMULSION: Performed by: INTERNAL MEDICINE

## 2017-12-20 PROCEDURE — 71010 XR CHEST 1 VW: CPT | Performed by: RADIOLOGY

## 2017-12-20 PROCEDURE — 25010000002 PIPERACILLIN-TAZOBACTAM: Performed by: PHYSICIAN ASSISTANT

## 2017-12-20 PROCEDURE — 86140 C-REACTIVE PROTEIN: CPT | Performed by: FAMILY MEDICINE

## 2017-12-20 PROCEDURE — 80202 ASSAY OF VANCOMYCIN: CPT | Performed by: FAMILY MEDICINE

## 2017-12-20 PROCEDURE — 94003 VENT MGMT INPAT SUBQ DAY: CPT

## 2017-12-20 PROCEDURE — 85007 BL SMEAR W/DIFF WBC COUNT: CPT | Performed by: FAMILY MEDICINE

## 2017-12-20 PROCEDURE — 87077 CULTURE AEROBIC IDENTIFY: CPT | Performed by: INTERNAL MEDICINE

## 2017-12-20 PROCEDURE — 87040 BLOOD CULTURE FOR BACTERIA: CPT | Performed by: PHYSICIAN ASSISTANT

## 2017-12-20 PROCEDURE — 87077 CULTURE AEROBIC IDENTIFY: CPT

## 2017-12-20 PROCEDURE — 84134 ASSAY OF PREALBUMIN: CPT | Performed by: INTERNAL MEDICINE

## 2017-12-20 PROCEDURE — 25010000002 METHYLPREDNISOLONE PER 40 MG: Performed by: NURSE PRACTITIONER

## 2017-12-20 RX ORDER — FAMOTIDINE 10 MG/ML
20 INJECTION, SOLUTION INTRAVENOUS EVERY 12 HOURS SCHEDULED
Qty: 1 VIAL | Refills: 1 | Status: SHIPPED | OUTPATIENT
Start: 2017-12-20 | End: 2017-12-20

## 2017-12-20 RX ORDER — ASPIRIN 81 MG/1
81 TABLET ORAL DAILY
Qty: 1 TABLET | Refills: 1 | Status: SHIPPED | OUTPATIENT
Start: 2017-12-21 | End: 2017-12-20

## 2017-12-20 RX ORDER — SODIUM CHLORIDE 0.9 % (FLUSH) 0.9 %
1-10 SYRINGE (ML) INJECTION AS NEEDED
Qty: 1 SYRINGE | Refills: 1 | Status: SHIPPED | OUTPATIENT
Start: 2017-12-20 | End: 2017-12-20

## 2017-12-20 RX ORDER — BUMETANIDE 0.25 MG/ML
1 INJECTION INTRAMUSCULAR; INTRAVENOUS ONCE
Status: COMPLETED | OUTPATIENT
Start: 2017-12-20 | End: 2017-12-20

## 2017-12-20 RX ORDER — IPRATROPIUM BROMIDE AND ALBUTEROL SULFATE 2.5; .5 MG/3ML; MG/3ML
3 SOLUTION RESPIRATORY (INHALATION)
Qty: 360 ML | Refills: 1 | Status: SHIPPED | OUTPATIENT
Start: 2017-12-20 | End: 2017-12-20

## 2017-12-20 RX ORDER — IPRATROPIUM BROMIDE AND ALBUTEROL SULFATE 2.5; .5 MG/3ML; MG/3ML
3 SOLUTION RESPIRATORY (INHALATION) EVERY 4 HOURS PRN
Qty: 360 ML | Refills: 1 | Status: SHIPPED | OUTPATIENT
Start: 2017-12-20 | End: 2017-12-20

## 2017-12-20 RX ADMIN — PIPERACILLIN SODIUM,TAZOBACTAM SODIUM 3.38 G: 3; .375 INJECTION, POWDER, FOR SOLUTION INTRAVENOUS at 00:14

## 2017-12-20 RX ADMIN — PIPERACILLIN SODIUM,TAZOBACTAM SODIUM 3.38 G: 3; .375 INJECTION, POWDER, FOR SOLUTION INTRAVENOUS at 15:56

## 2017-12-20 RX ADMIN — IPRATROPIUM BROMIDE AND ALBUTEROL SULFATE 3 ML: .5; 3 SOLUTION RESPIRATORY (INHALATION) at 07:02

## 2017-12-20 RX ADMIN — VANCOMYCIN HYDROCHLORIDE 1000 MG: 5 INJECTION, POWDER, LYOPHILIZED, FOR SOLUTION INTRAVENOUS at 13:56

## 2017-12-20 RX ADMIN — BUMETANIDE 1 MG: 0.25 INJECTION, SOLUTION INTRAMUSCULAR; INTRAVENOUS at 15:49

## 2017-12-20 RX ADMIN — Medication: at 10:12

## 2017-12-20 RX ADMIN — IPRATROPIUM BROMIDE AND ALBUTEROL SULFATE 3 ML: .5; 3 SOLUTION RESPIRATORY (INHALATION) at 00:49

## 2017-12-20 RX ADMIN — METHYLPREDNISOLONE SODIUM SUCCINATE 40 MG: 125 INJECTION, POWDER, FOR SOLUTION INTRAMUSCULAR; INTRAVENOUS at 06:00

## 2017-12-20 RX ADMIN — PIPERACILLIN SODIUM,TAZOBACTAM SODIUM 3.38 G: 3; .375 INJECTION, POWDER, FOR SOLUTION INTRAVENOUS at 08:23

## 2017-12-20 RX ADMIN — PROPOFOL 40 MCG/KG/MIN: 10 INJECTION, EMULSION INTRAVENOUS at 11:41

## 2017-12-20 RX ADMIN — IPRATROPIUM BROMIDE AND ALBUTEROL SULFATE 3 ML: .5; 3 SOLUTION RESPIRATORY (INHALATION) at 12:08

## 2017-12-20 RX ADMIN — PROPOFOL 40 MCG/KG/MIN: 10 INJECTION, EMULSION INTRAVENOUS at 15:56

## 2017-12-20 RX ADMIN — PROPOFOL 40 MCG/KG/MIN: 10 INJECTION, EMULSION INTRAVENOUS at 06:09

## 2017-12-20 RX ADMIN — ASPIRIN 81 MG: 81 TABLET ORAL at 08:23

## 2017-12-20 RX ADMIN — FAMOTIDINE 20 MG: 10 INJECTION INTRAVENOUS at 08:24

## 2017-12-20 RX ADMIN — ENOXAPARIN SODIUM 40 MG: 40 INJECTION SUBCUTANEOUS at 08:23

## 2017-12-20 RX ADMIN — Medication 10 ML: at 08:24

## 2017-12-20 RX ADMIN — PROPOFOL 40 MCG/KG/MIN: 10 INJECTION, EMULSION INTRAVENOUS at 01:25

## 2017-12-20 NOTE — PROGRESS NOTES
Nephrology  Note    Subjective       No acute events reported overnight    Objective     Vital Signs  Temp:  [98.1 °F (36.7 °C)-99.9 °F (37.7 °C)] 98.1 °F (36.7 °C)  Heart Rate:  [] 71  Resp:  [23-30] 23  BP: (123-171)/(63-90) 163/79  FiO2 (%):  [60 %] 60 %    I/O this shift:  In: 100 [IV Piggyback:100]  Out: -   I/O last 3 completed shifts:  In: 3491.4 [I.V.:1123.4; NG/GT:1568; IV Piggyback:800]  Out: 2090 [Urine:2090]    Physical Examination:    General Appearance : intubated and sedated  Head : normocephalic  Eyes : , no pallor   Throat : oral mucosa moist  Neck: no JVD  Lungs : clear to auscultation  Heart : regular rhythm & normal rate, normal S1, S2, no murmur  Abdomen :  normal bowel sounds, no masses and soft non-tender  Extremities : no edema  Neurologic : sedated    Laboratory Data :      WBC WBC   Date Value Ref Range Status   12/20/2017 16.98 (H) 4.50 - 12.50 10*3/mm3 Final   12/19/2017 18.67 (H) 4.50 - 12.50 10*3/mm3 Final   12/18/2017 16.57 (H) 4.50 - 12.50 10*3/mm3 Final   12/18/2017 18.06 (H) 4.50 - 12.50 10*3/mm3 Final      HGB Hemoglobin   Date Value Ref Range Status   12/20/2017 14.5 14.0 - 18.0 g/dL Final   12/19/2017 14.8 14.0 - 18.0 g/dL Final   12/18/2017 16.1 14.0 - 18.0 g/dL Final   12/18/2017 17.0 14.0 - 18.0 g/dL Final      HCT Hematocrit   Date Value Ref Range Status   12/20/2017 45.2 42.0 - 52.0 % Final   12/19/2017 45.4 42.0 - 52.0 % Final   12/18/2017 48.6 42.0 - 52.0 % Final   12/18/2017 51.4 42.0 - 52.0 % Final      Platlets No results found for: LABPLAT   MCV MCV   Date Value Ref Range Status   12/20/2017 101.1 (H) 80.0 - 94.0 fL Final   12/19/2017 100.7 (H) 80.0 - 94.0 fL Final   12/18/2017 98.8 (H) 80.0 - 94.0 fL Final   12/18/2017 98.1 (H) 80.0 - 94.0 fL Final          Sodium Sodium   Date Value Ref Range Status   12/20/2017 145 135 - 153 mmol/L Final   12/19/2017 143 135 - 153 mmol/L Final   12/18/2017 142 135 - 153 mmol/L Final      Potassium Potassium   Date Value Ref  Range Status   12/20/2017 4.8 3.5 - 5.3 mmol/L Final   12/19/2017 4.2 3.5 - 5.3 mmol/L Final   12/18/2017 4.0 3.5 - 5.3 mmol/L Final      Chloride Chloride   Date Value Ref Range Status   12/20/2017 111 99 - 112 mmol/L Final   12/19/2017 108 99 - 112 mmol/L Final   12/18/2017 105 99 - 112 mmol/L Final      CO2 CO2   Date Value Ref Range Status   12/20/2017 29.5 24.3 - 31.9 mmol/L Final   12/19/2017 30.0 24.3 - 31.9 mmol/L Final   12/18/2017 29.6 24.3 - 31.9 mmol/L Final      BUN BUN   Date Value Ref Range Status   12/20/2017 39 (H) 7 - 21 mg/dL Final   12/19/2017 50 (H) 7 - 21 mg/dL Final   12/18/2017 38 (H) 7 - 21 mg/dL Final      Creatinine Creatinine   Date Value Ref Range Status   12/20/2017 1.01 0.43 - 1.29 mg/dL Final   12/19/2017 1.52 (H) 0.43 - 1.29 mg/dL Final   12/18/2017 1.13 0.43 - 1.29 mg/dL Final      Calcium Calcium   Date Value Ref Range Status   12/20/2017 9.2 7.7 - 10.0 mg/dL Final   12/19/2017 9.1 7.7 - 10.0 mg/dL Final   12/18/2017 8.6 7.7 - 10.0 mg/dL Final      PO4 No results found for: CAPO4   Albumin Albumin   Date Value Ref Range Status   12/20/2017 3.50 3.40 - 4.80 g/dL Final   12/19/2017 3.60 3.40 - 4.80 g/dL Final   12/18/2017 3.70 3.40 - 4.80 g/dL Final      Magnesium No results found for: MG   Uric Acid No results found for: URICACID     Radiology results :     Imaging Results (last 72 hours)     Procedure Component Value Units Date/Time    XR Chest 1 View [[id number]] Collected:  12/18/17 1109     Updated:  12/18/17 1113    Narrative:       EXAMINATION: XR CHEST 1 VW-      CLINICAL INDICATION:     respiratory distress; R74.8-Abnormal levels of  other serum enzymes; J44.1-Chronic obstructive pulmonary disease with  (acute) exacerbation     TECHNIQUE:  XR CHEST 1 VW-      COMPARISON: 12/15/2017      FINDINGS:   Very small effusions are noted. Mild basilar atelectasis.   Heart size within normal limits.   ET tube with tip below level of clavicles and above the kimberley.   NG tube extends  into stomach.   No pneumothorax.            Impression:       1. Satisfactory positioning of support devices.  2. Mild basilar atelectasis with trace pleural effusions noted.     This report was finalized on 12/18/2017 11:11 AM by Dr. Eliseo Kidd MD.               Medications:        aspirin 81 mg Oral Daily   enoxaparin 40 mg Subcutaneous Q24H   famotidine 20 mg Intravenous Q12H   ipratropium-albuterol 3 mL Nebulization 4x Daily - RT   methylPREDNISolone sodium succinate 40 mg Intravenous Q12H   piperacillin-tazobactam 3.375 g Intravenous Q8H   sodium chloride 10 mL Intracatheter Q12H   vancomycin 1,000 mg Intravenous Q12H   vecuronium 10 mg Intravenous Once       diltiaZEM 10 mg/hr Last Rate: Stopped (12/15/17 0540)   DOPamine 2-20 mcg/kg/min Last Rate: Stopped (12/17/17 1506)   fentaNYL (SUBLIMAZE) PCA 1500 mcg/30 mL syringe     norepinephrine 0.02-0.3 mcg/kg/min Last Rate: Stopped (12/15/17 1107)   propofol 5-50 mcg/kg/min Last Rate: 40 mcg/kg/min (12/20/17 1141)       Assessment/Plan     Active Problems:    Elevated troponin      1. Non oliguric ISABEL : His baseline creatinine is 1.1 and it is better at 1 today from 1.5 . Will start free water flushes  ISABEL from  Lisinopril, lasix  and spironolactone which have been stopped. Recommend to hold lisinopril while critically ill  Renal US is normal. UA bland    2. Respiratory failure    3. Systolic CHF compensated    4. Sepsis/pneumonia    5. Copd exacerbation on vent    I discussed the patients findings and my recommendations with nursing staff    Salomón Griffin MD  12/20/17  12:50 PM

## 2017-12-20 NOTE — PROGRESS NOTES
[facility] Physician - Brief Progress Note  PERMANENT  12/20/2017 14:02    [facility]  [facility] - CCU - 10 - [place], [place] ([facility])    DEVEN CABA    Date of Service 12/20/2017 14:02    HPI/Events of Note 72-year-old male initially intubated with COPD and congestive heart failure.  Throughout the hospital course his eyes and nose have been largely negative but over the last 2 days has been positive again.  Initial chest x-ray showed   pulmonary edema but chest x-ray from 2 days ago was clear.  I personally reviewed the patient's echocardiogram and he has left ventricular enlargement with moderate LV dysfunction/HFmrEF ejection fraction probably more around 35% with global hypokinesis,   left atrial enlargement and mild to moderate mitral regurgitation as well as tricuspid regurgitation.  Tissue Doppler consistent with elevated left atrial pressures.  Currently the patient is still on 60% FiO2 and PEEP is adequate at +5 but minute   ventilation is also borderline at 12 L/m.  Patient still has a low-grade fever also 100.7 and elevated white count.  He is currently not on pressors anymore but still sedation with fentanyl and propofol.  All parameters taken into account the patient is   borderline for SBT readiness.  Nevertheless I think it is reasonable to go ahead and wean his FiO2 to 50%, give the patient a milligram of bumetanide [I understand that his creatinine has improved with giving him fluids and discontinuing Lasix, but patient likely has cardiorenal   syndrome type II and I do not think he can be extubated with completely normal creatinine].  At this point it's more important to proceed at the earliest convenient time with an SBT.  Wean sedation to off.  Decrease FiO2 to 50%.  Wrote order for bumetanide.  If patient becomes awake and alert then initiate with respiratory therapy a T piece trial with flow by.  I do not want CPAP 5 and pressure support of 5 as distal provide   significant  afterload in a patient with an ejection fraction of 30-35%.  Given his LV dysfunction this will give us the best estimate if the patient is ready for liberation from mechanical ventilation.  We'll also need ABG once we decide to proceed with   SBT on T piece trial      Interventions Major-Respiratory failure - evaluation and management, Other: SBT readiness evaluation        Electronically Signed by: Nawaf Casillas) on 12/20/2017 2:03 PM

## 2017-12-20 NOTE — NURSING NOTE
Patient presents with DTI to left lateral foot 2 x 1 cm purple intact  Stage 1 to left lateral heel  1 x 1 cm  Red / intact/ non blanchable  All pressure has been removed from foot

## 2017-12-20 NOTE — PROGRESS NOTES
Salomón Rodriguez 72 y.o.   12/20/17    Subjective: Patient currently resting on a ventilator with sedation and vital signs are all stable  Objective: Pulse unstable in the mid 90s abdomen soft extremities no edema  Vital Signs (last 72 hrs)       12/16 0700  -  12/17 0659 12/17 0700  -  12/18 0659 12/18 0700  -  12/19 0659 12/19 0700  -  12/20 0628   Most Recent    Temp (°F) 98 -  99.4    98.7 -  100    98.2 -  (!)101.4    98.1 -  (!)100.7     99.9 (37.7)    Heart Rate (!)46 -  66    55 -  79    63 -  104    65 -  103     68    Resp   22    22 -  26    22 -  (!)34    23 -  (!)30     26    /64 -  166/88    119/59 -  148/78    120/66 -  168/82    123/63 -  166/80     129/63    SpO2 (%) (!)89 -  96    (!)83 -  94    (!)84 -  99    95 -  98     98        Lab Results (last 72 hours)     Procedure Component Value Units Date/Time    Blood Gas, Arterial [[id number]]  (Abnormal) Collected:  12/17/17 0728    Specimen:  Arterial Blood Updated:  12/17/17 0740     Site Arterial: right radial     Ten's Test Positive     pH, Arterial 7.377 pH units      pCO2, Arterial 50.6 (C) mm Hg      pO2, Arterial 69.0 (L) mm Hg      HCO3, Arterial 29.1 (H) mmol/L      Base Excess, Arterial 2.7 mmol/L      O2 Saturation, Arterial 93.0 %      Hemoglobin, Blood Gas 16.9 (H) g/dL      Hematocrit, Blood Gas 50.0 %      Oxyhemoglobin 92.0 %      Methemoglobin 0.40 %      Carboxyhemoglobin 0.7 %      A-a Gradiant 283.3 mmHg      Temperature 98.6 C      Barometric Pressure for Blood Gas 727 mmHg      Modality Ventilator     FIO2 60 %      Ventilator Mode ac     Set Tidal Volume 450     Set Mech Resp Rate 22     PEEP 5    Troponin [[id number]]  (Abnormal) Collected:  12/17/17 1154    Specimen:  Blood Updated:  12/17/17 1259     Troponin I 0.247 (H) ng/mL     Narrative:       Ultra Troponin I Reference Range:         <=0.039 ng/mL: Negative    0.04-0.779 ng/mL: Indeterminate Range. Suspicious of MI.  Clinical correlation required.       >=0.78   ng/mL: Consistent with myocardial injury.  Clinical correlation required.    Troponin [[id number]]  (Abnormal) Collected:  12/17/17 1716    Specimen:  Blood Updated:  12/17/17 1812     Troponin I 0.292 (H) ng/mL     Narrative:       Ultra Troponin I Reference Range:         <=0.039 ng/mL: Negative    0.04-0.779 ng/mL: Indeterminate Range. Suspicious of MI.  Clinical correlation required.       >=0.78  ng/mL: Consistent with myocardial injury.  Clinical correlation required.    CBC & Differential [[id number]] Collected:  12/18/17 0049    Specimen:  Blood Updated:  12/18/17 0131    Narrative:       The following orders were created for panel order CBC & Differential.  Procedure                               Abnormality         Status                     ---------                               -----------         ------                     CBC Auto Differential[[id number]]        Abnormal            Final result                 Please view results for these tests on the individual orders.    CBC Auto Differential [[id number]]  (Abnormal) Collected:  12/18/17 0049    Specimen:  Blood Updated:  12/18/17 0131     WBC 18.06 (H) 10*3/mm3      RBC 5.24 10*6/mm3      Hemoglobin 17.0 g/dL      Hematocrit 51.4 %      MCV 98.1 (H) fL      MCH 32.4 pg      MCHC 33.1 g/dL      RDW 13.2 %      RDW-SD 47.2 fl      MPV 10.2 (H) fL      Platelets 137 10*3/mm3      Neutrophil % 92.1 (H) %      Lymphocyte % 2.9 (L) %      Monocyte % 4.7 %      Eosinophil % 0.1 %      Basophil % 0.0 %      Immature Grans % 0.2 %      Neutrophils, Absolute 16.64 (H) 10*3/mm3      Lymphocytes, Absolute 0.52 (L) 10*3/mm3      Monocytes, Absolute 0.85 10*3/mm3      Eosinophils, Absolute 0.01 10*3/mm3      Basophils, Absolute 0.00 10*3/mm3      Immature Grans, Absolute 0.04 (H) 10*3/mm3     Troponin [[id number]]  (Abnormal) Collected:  12/18/17 0049    Specimen:  Blood Updated:  12/18/17 0144     Troponin I 0.208 (H) ng/mL     Narrative:       Ultra Troponin I  Reference Range:         <=0.039 ng/mL: Negative    0.04-0.779 ng/mL: Indeterminate Range. Suspicious of MI.  Clinical correlation required.       >=0.78  ng/mL: Consistent with myocardial injury.  Clinical correlation required.    Blood Culture - Blood, [[id number]]  (Normal) Collected:  12/13/17 0313    Specimen:  Blood from Arm, Left Updated:  12/18/17 0331     Blood Culture No growth at 5 days    Blood Culture - Blood, [[id number]]  (Normal) Collected:  12/13/17 0348    Specimen:  Blood from Arm, Left Updated:  12/18/17 0401     Blood Culture No growth at 5 days    CBC & Differential [[id number]] Collected:  12/18/17 0537    Specimen:  Blood Updated:  12/18/17 0547    Narrative:       The following orders were created for panel order CBC & Differential.  Procedure                               Abnormality         Status                     ---------                               -----------         ------                     CBC Auto Differential[[id number]]        Abnormal            Final result                 Please view results for these tests on the individual orders.    CBC Auto Differential [[id number]]  (Abnormal) Collected:  12/18/17 0537    Specimen:  Blood Updated:  12/18/17 0547     WBC 16.57 (H) 10*3/mm3      RBC 4.92 10*6/mm3      Hemoglobin 16.1 g/dL      Hematocrit 48.6 %      MCV 98.8 (H) fL      MCH 32.7 pg      MCHC 33.1 g/dL      RDW 13.1 %      RDW-SD 46.4 fl      MPV 9.8 fL      Platelets 121 (L) 10*3/mm3      Neutrophil % 91.2 (H) %      Lymphocyte % 4.2 (L) %      Monocyte % 4.3 %      Eosinophil % 0.0 %      Basophil % 0.1 %      Immature Grans % 0.2 %      Neutrophils, Absolute 15.11 (H) 10*3/mm3      Lymphocytes, Absolute 0.69 (L) 10*3/mm3      Monocytes, Absolute 0.72 10*3/mm3      Eosinophils, Absolute 0.00 10*3/mm3      Basophils, Absolute 0.01 10*3/mm3      Immature Grans, Absolute 0.04 (H) 10*3/mm3     Comprehensive Metabolic Panel [[id number]]  (Abnormal) Collected:  12/18/17 0537     Specimen:  Blood Updated:  12/18/17 0614     Glucose 124 (H) mg/dL      BUN 38 (H) mg/dL      Creatinine 1.13 mg/dL      Sodium 142 mmol/L      Potassium 4.0 mmol/L      Chloride 105 mmol/L      CO2 29.6 mmol/L      Calcium 8.6 mg/dL      Total Protein 6.4 g/dL      Albumin 3.70 g/dL      ALT (SGPT) 56 (H) U/L      AST (SGOT) 19 U/L      Alkaline Phosphatase 56 U/L       Note New Reference Ranges        Total Bilirubin 0.7 mg/dL      eGFR Non African Amer 64 mL/min/1.73      Globulin 2.7 gm/dL      A/G Ratio 1.4 (L) g/dL      BUN/Creatinine Ratio 33.6 (H)     Anion Gap 7.4 mmol/L     Osmolality, Calculated [[id number]]  (Normal) Collected:  12/18/17 0537    Specimen:  Blood Updated:  12/18/17 0614     Osmolality Calc 293.6 mOsm/kg     Troponin [[id number]]  (Abnormal) Collected:  12/18/17 0537    Specimen:  Blood Updated:  12/18/17 0626     Troponin I 0.177 (H) ng/mL     Narrative:       Ultra Troponin I Reference Range:         <=0.039 ng/mL: Negative    0.04-0.779 ng/mL: Indeterminate Range. Suspicious of MI.  Clinical correlation required.       >=0.78  ng/mL: Consistent with myocardial injury.  Clinical correlation required.    Blood Gas, Arterial [[id number]]  (Abnormal) Collected:  12/18/17 0807    Specimen:  Arterial Blood Updated:  12/18/17 0833     Site Arterial: right radial     Ten's Test Positive     pH, Arterial 7.434 pH units      pCO2, Arterial 43.6 mm Hg      pO2, Arterial 80.2 mm Hg      HCO3, Arterial 28.6 (H) mmol/L      Base Excess, Arterial 3.7 mmol/L      O2 Saturation, Arterial 96.4 %      Hemoglobin, Blood Gas 16.8 (H) g/dL      Hematocrit, Blood Gas 49.0 %      Oxyhemoglobin 95.2 %      Methemoglobin 0.50 %      Carboxyhemoglobin 0.7 %      A-a Gradiant 279.8 mmHg      Temperature 98.6 C      Barometric Pressure for Blood Gas 727 mmHg      Modality Ventilator     FIO2 60 %      Ventilator Mode ac     Set Tidal Volume 450     Set Mech Resp Rate 22     PEEP 5    Urinalysis With / Culture If  Indicated - Urine, Clean Catch [[id number]]  (Normal) Collected:  12/18/17 0852    Specimen:  Urine from Urine, Catheter Updated:  12/18/17 0937     Color, UA Yellow     Appearance, UA Clear     pH, UA <=5.0     Specific Gravity, UA 1.021     Glucose, UA Negative     Ketones, UA Negative     Bilirubin, UA Negative     Blood, UA Negative     Protein, UA Negative     Leuk Esterase, UA Negative     Nitrite, UA Negative     Urobilinogen, UA 0.2 E.U./dL    Narrative:       Urine microscopic not indicated.    Troponin [[id number]]  (Abnormal) Collected:  12/18/17 1204    Specimen:  Blood Updated:  12/18/17 1300     Troponin I 0.192 (H) ng/mL     Narrative:       Ultra Troponin I Reference Range:         <=0.039 ng/mL: Negative    0.04-0.779 ng/mL: Indeterminate Range. Suspicious of MI.  Clinical correlation required.       >=0.78  ng/mL: Consistent with myocardial injury.  Clinical correlation required.    Troponin [[id number]]  (Abnormal) Collected:  12/18/17 1721    Specimen:  Blood Updated:  12/18/17 1818     Troponin I 0.215 (H) ng/mL     Narrative:       Ultra Troponin I Reference Range:         <=0.039 ng/mL: Negative    0.04-0.779 ng/mL: Indeterminate Range. Suspicious of MI.  Clinical correlation required.       >=0.78  ng/mL: Consistent with myocardial injury.  Clinical correlation required.    Mycoplasma Pneumoniae Antibody, IgM [[id number]]  (Normal) Collected:  12/18/17 1727    Specimen:  Blood Updated:  12/18/17 1855     Mycoplasma pneumo IgM Negative    Legionella Antigen, Urine - Urine, Clean Catch [[id number]]  (Normal) Collected:  12/18/17 1755    Specimen:  Urine from Urine, Catheter Updated:  12/18/17 1937     LEGIONELLA ANTIGEN, URINE Negative    Narrative:         Presumptive negative for L. pneumophilia serogroup 1 antigen, suggesting no recent or current infection.    Troponin [[id number]]  (Abnormal) Collected:  12/19/17 0036    Specimen:  Blood Updated:  12/19/17 0154     Troponin I 0.147 (H) ng/mL      Narrative:       Ultra Troponin I Reference Range:         <=0.039 ng/mL: Negative    0.04-0.779 ng/mL: Indeterminate Range. Suspicious of MI.  Clinical correlation required.       >=0.78  ng/mL: Consistent with myocardial injury.  Clinical correlation required.    C-reactive Protein [[id number]]  (Abnormal) Collected:  12/19/17 0036    Specimen:  Blood Updated:  12/19/17 0157     C-Reactive Protein 31.29 (H) mg/dL     CBC & Differential [[id number]] Collected:  12/19/17 0517    Specimen:  Blood Updated:  12/19/17 0528    Narrative:       The following orders were created for panel order CBC & Differential.  Procedure                               Abnormality         Status                     ---------                               -----------         ------                     CBC Auto Differential[[id number]]        Abnormal            Final result                 Please view results for these tests on the individual orders.    CBC Auto Differential [[id number]]  (Abnormal) Collected:  12/19/17 0517    Specimen:  Blood Updated:  12/19/17 0528     WBC 18.67 (H) 10*3/mm3      RBC 4.51 (L) 10*6/mm3      Hemoglobin 14.8 g/dL      Hematocrit 45.4 %      .7 (H) fL      MCH 32.8 pg      MCHC 32.6 (L) g/dL      RDW 13.1 %      RDW-SD 48.0 fl      MPV 10.1 (H) fL      Platelets 103 (L) 10*3/mm3      Neutrophil % 90.9 (H) %      Lymphocyte % 3.5 (L) %      Monocyte % 4.9 %      Eosinophil % 0.1 %      Basophil % 0.1 %      Immature Grans % 0.5 %      Neutrophils, Absolute 16.99 (H) 10*3/mm3      Lymphocytes, Absolute 0.65 (L) 10*3/mm3      Monocytes, Absolute 0.92 (H) 10*3/mm3      Eosinophils, Absolute 0.01 10*3/mm3      Basophils, Absolute 0.01 10*3/mm3      Immature Grans, Absolute 0.09 (H) 10*3/mm3     Comprehensive Metabolic Panel [[id number]]  (Abnormal) Collected:  12/19/17 0517    Specimen:  Blood Updated:  12/19/17 0553     Glucose 169 (H) mg/dL      BUN 50 (H) mg/dL      Creatinine 1.52 (H) mg/dL      Sodium  143 mmol/L      Potassium 4.2 mmol/L      Chloride 108 mmol/L      CO2 30.0 mmol/L      Calcium 9.1 mg/dL      Total Protein 6.4 g/dL      Albumin 3.60 g/dL      ALT (SGPT) 32 U/L      AST (SGOT) 9 (L) U/L      Alkaline Phosphatase 63 U/L       Note New Reference Ranges        Total Bilirubin 0.6 mg/dL      eGFR Non African Amer 46 (L) mL/min/1.73      Globulin 2.8 gm/dL      A/G Ratio 1.3 (L) g/dL      BUN/Creatinine Ratio 32.9 (H)     Anion Gap 5.0 mmol/L     Osmolality, Calculated [[id number]]  (Normal) Collected:  12/19/17 0517    Specimen:  Blood Updated:  12/19/17 0553     Osmolality Calc 302.2 mOsm/kg     Troponin [[id number]]  (Abnormal) Collected:  12/19/17 0517    Specimen:  Blood Updated:  12/19/17 0615     Troponin I 0.131 (H) ng/mL     Narrative:       Ultra Troponin I Reference Range:         <=0.039 ng/mL: Negative    0.04-0.779 ng/mL: Indeterminate Range. Suspicious of MI.  Clinical correlation required.       >=0.78  ng/mL: Consistent with myocardial injury.  Clinical correlation required.    Sodium, Urine, Random - Urine, Clean Catch [[id number]] Collected:  12/19/17 1029    Specimen:  Urine from Urine, Catheter Updated:  12/19/17 1050     Sodium, Urine 47 mmol/L     Creatinine, Urine, Random - Urine, Clean Catch [[id number]] Collected:  12/19/17 1029    Specimen:  Urine from Urine, Catheter Updated:  12/19/17 1050     Creatinine, Urine 88.1 mg/dL     Blood Culture - Blood, [[id number]]  (Abnormal) Collected:  12/18/17 1611    Specimen:  Blood from Arm, Right Updated:  12/19/17 1134     Blood Culture Abnormal Stain (A)     Gram Stain Result Gram negative bacilli    Blood Culture - Blood, [[id number]]  (Abnormal) Collected:  12/18/17 1533    Specimen:  Blood from Arm, Right Updated:  12/19/17 1134     Blood Culture Abnormal Stain (A)     Gram Stain Result Gram negative bacilli    Troponin [[id number]]  (Abnormal) Collected:  12/19/17 1230    Specimen:  Blood Updated:  12/19/17 1351     Troponin I 0.089 (H)  ng/mL     Narrative:       Ultra Troponin I Reference Range:         <=0.039 ng/mL: Negative    0.04-0.779 ng/mL: Indeterminate Range. Suspicious of MI.  Clinical correlation required.       >=0.78  ng/mL: Consistent with myocardial injury.  Clinical correlation required.    Troponin [[id number]]  (Abnormal) Collected:  12/19/17 1724    Specimen:  Blood Updated:  12/19/17 1807     Troponin I 0.103 (H) ng/mL     Narrative:       Ultra Troponin I Reference Range:         <=0.039 ng/mL: Negative    0.04-0.779 ng/mL: Indeterminate Range. Suspicious of MI.  Clinical correlation required.       >=0.78  ng/mL: Consistent with myocardial injury.  Clinical correlation required.    Respiratory Culture - Sputum, ET Suction [[id number]] Collected:  12/19/17 1034    Specimen:  Sputum from ET Suction Updated:  12/19/17 2251     Gram Stain Result Moderate (3+) WBCs seen      Few (2+) Gram negative bacilli      Occasional Gram positive cocci in pairs    Troponin [[id number]]  (Abnormal) Collected:  12/20/17 0028    Specimen:  Blood Updated:  12/20/17 0150     Troponin I 0.111 (H) ng/mL     Narrative:       Ultra Troponin I Reference Range:         <=0.039 ng/mL: Negative    0.04-0.779 ng/mL: Indeterminate Range. Suspicious of MI.  Clinical correlation required.       >=0.78  ng/mL: Consistent with myocardial injury.  Clinical correlation required.    CBC Auto Differential [[id number]]  (Abnormal) Collected:  12/20/17 0459    Specimen:  Blood Updated:  12/20/17 0526     WBC 16.98 (H) 10*3/mm3      RBC 4.47 (L) 10*6/mm3      Hemoglobin 14.5 g/dL      Hematocrit 45.2 %      .1 (H) fL      MCH 32.4 pg      MCHC 32.1 (L) g/dL      RDW 13.1 %      RDW-SD 48.4 fl      MPV 10.4 (H) fL      Platelets 123 (L) 10*3/mm3      Neutrophil % 86.8 (H) %      Lymphocyte % 2.2 (L) %      Monocyte % 10.0 %      Eosinophil % 0.0 %      Basophil % 0.1 %      Immature Grans % 0.9 (H) %      Neutrophils, Absolute 14.75 (H) 10*3/mm3      Lymphocytes,  Absolute 0.38 (L) 10*3/mm3      Monocytes, Absolute 1.69 (H) 10*3/mm3      Eosinophils, Absolute 0.00 10*3/mm3      Basophils, Absolute 0.01 10*3/mm3      Immature Grans, Absolute 0.15 (H) 10*3/mm3     CBC & Differential [[id number]] Collected:  12/20/17 0459    Specimen:  Blood Updated:  12/20/17 0550    Narrative:       The following orders were created for panel order CBC & Differential.  Procedure                               Abnormality         Status                     ---------                               -----------         ------                     Scan Slide[[id number]]                                       Final result               CBC Auto Differential[[id number]]        Abnormal            Final result                 Please view results for these tests on the individual orders.    Scan Slide [[id number]] Collected:  12/20/17 0459    Specimen:  Blood Updated:  12/20/17 0550     Macrocytes Slight/1+     Platelet Estimate Decreased    Comprehensive Metabolic Panel [[id number]]  (Abnormal) Collected:  12/20/17 0459    Specimen:  Blood Updated:  12/20/17 0554     Glucose 164 (H) mg/dL      BUN 39 (H) mg/dL      Creatinine 1.01 mg/dL      Sodium 145 mmol/L      Potassium 4.8 mmol/L      Chloride 111 mmol/L      CO2 29.5 mmol/L      Calcium 9.2 mg/dL      Total Protein 6.3 g/dL      Albumin 3.50 g/dL      ALT (SGPT) 37 U/L      AST (SGOT) 26 U/L      Alkaline Phosphatase 85 U/L       Note New Reference Ranges        Total Bilirubin 0.5 mg/dL      eGFR Non African Amer 73 mL/min/1.73      Globulin 2.8 gm/dL      A/G Ratio 1.3 (L) g/dL      BUN/Creatinine Ratio 38.6 (H)     Anion Gap 4.5 mmol/L     Osmolality, Calculated [[id number]]  (Normal) Collected:  12/20/17 0459    Specimen:  Blood Updated:  12/20/17 0554     Osmolality Calc 301.7 mOsm/kg     C-reactive Protein [[id number]]  (Abnormal) Collected:  12/20/17 0459    Specimen:  Blood Updated:  12/20/17 0554     C-Reactive Protein 30.13 (H) mg/dL     Troponin  [[id number]]  (Abnormal) Collected:  12/20/17 0459    Specimen:  Blood Updated:  12/20/17 0617     Troponin I 0.102 (H) ng/mL     Narrative:       Ultra Troponin I Reference Range:         <=0.039 ng/mL: Negative    0.04-0.779 ng/mL: Indeterminate Range. Suspicious of MI.  Clinical correlation required.       >=0.78  ng/mL: Consistent with myocardial injury.  Clinical correlation required.        Imaging Results (last 24 hours)     Procedure Component Value Units Date/Time    US Renal Bilateral [[id number]] Updated:  12/19/17 1622        Assessment:Active Problems:    Elevated troponin   Acute on chronic respiratory failure  Aspiration pneumonia/sepsis  Nonoliguric AKA  Systolic congestive heart failure  Plan: Lisinopril and spironolactone have been DC'd but remove nephrotoxic medications  Social and by Elvin continued for robotic coverage  Patient with a chronic systolic congestive heart failure due to nonischemic cardiomyopathy a currently intubated but but is now off vasopressors with indeterminate troponin levels and will continue with diuresis

## 2017-12-20 NOTE — PROGRESS NOTES
Discharge Planning Assessment   Irvin     Patient Name: Salomón Rodriguez  MRN: [id number]  Today's Date: 12/20/2017    Admit Date: 12/13/2017       Discharge Plan       12/20/17 1158    Case Management/Social Work Plan    Plan SS received consult for [facility].  SS contacted Carlyn at [facility] and she is agreeable to review the pt for possible transfer.  SS faxed a referral to [phone].  SS will continue to follow.     Patient/Family In Agreement With Plan yes          Expected Discharge Date and Time     Expected Discharge Date Expected Discharge Time    Dec 20, 2017         Yamini Lucas

## 2017-12-20 NOTE — PROGRESS NOTES
Kinetics :  Vancomycin  Day 3    The pre-dose vancomycin level exceeded the goal range for this therapy.  Will reduce the vancomycin dose to 1gm q 12 hrs in anticipation of therapeutic levels and minimize any further accumulation of the drug.  The serum creatinine is noted to be significantly improved from yesterday.  Will follow with you.

## 2017-12-20 NOTE — PROGRESS NOTES
"  I have personally seen and examined the patient today and discussed overnight interval progress and pertinent issues with nursing staff.    Subjective:    CRP level still elevated but showing mild improvement with improving leukocytosis as well as improved fever trend.  Negative Legionella urinary antigen and negative mycoplasma IgM.  2 positive blood cultures out of 2 from 12/18/2017 showing growth of gram-negative bacilli.  Repeat blood cultures ordered ×2 sets peripherally today.  Respiratory culture showing gram-negative bacilli as well.  On 60% FiO2.     History taken from: chart RN      Vital Signs    /79  Pulse 71  Temp 98.1 °F (36.7 °C) (Oral)   Resp 23  Ht 175.3 cm (69\")  Wt 85.3 kg (188 lb)  SpO2 97%  BMI 27.76 kg/m2    Temp:  [98.1 °F (36.7 °C)-99.9 °F (37.7 °C)] 98.1 °F (36.7 °C)      Intake/Output Summary (Last 24 hours) at 12/20/17 1240  Last data filed at 12/20/17 0823   Gross per 24 hour   Intake          2940.64 ml   Output             1650 ml   Net          1290.64 ml     Intake & Output (last 3 days)       12/17 0701 - 12/18 0700 12/18 0701 - 12/19 0700 12/19 0701 - 12/20 0700 12/20 0701 - 12/21 0700    I.V. (mL/kg) 677 (7.6) 866.3 (9.7) 822.6 (9.6)     NG/GT  751 1568     IV Piggyback  700 800 100    Total Intake(mL/kg) 677 (7.6) 2317.3 (26.1) 3190.6 (37.4) 100 (1.2)    Urine (mL/kg/hr) 2360 (1.1) 1580 (0.7) 2030 (1)     Total Output 2360 1580 2030      Net -1683 +737.3 +1160.6 +100                  Physical Exam:       General Appearance:   Sedated, intubated on the ventilator    Head:    Normocephalic, without obvious abnormality, atraumatic   Eyes:            Lids and lashes normal, conjunctivae and sclerae normal, no   icterus, no pallor, corneas clear, PERRLA   Ears:    Ears appear intact with no abnormalities noted   Throat:   No oral lesions, no thrush, oral mucosa moist   Neck:   No adenopathy, supple, trachea midline, no thyromegaly, no   carotid bruit, no JVD   Back:     " No tenderness to percussion or palpation, range of motion   normal   Lungs:     Scattered wheezes and decreased breath sound     Heart:    Regular rhythm and normal rate, normal S1 and S2, no            murmur, no gallop, no rub, no click   Chest Wall:    No abnormalities observed   Abdomen:     Normal bowel sounds, no masses, no organomegaly, soft        non-tender, non-distended, no guarding, no rebound                tenderness   Rectal:     Deferred   Extremities:   Moves all extremities well, no edema, no cyanosis, no             redness   Pulses:   Pulses palpable and equal bilaterally   Skin:   No bleeding, bruising or rash.  Onychomycosis bilaterally to the feet.  2 peripheral IVs look okay.     Lymph nodes:   No palpable adenopathy   Neurologic:   Sedated on the ventilator          Results:      Results from last 7 days  Lab Units 12/20/17  0459 12/19/17  0517 12/18/17  0537 12/18/17  0049 12/17/17  0019 12/16/17  0544 12/15/17  0046   WBC 10*3/mm3 16.98* 18.67* 16.57* 18.06* 10.07 12.33 12.10     Lab Results   Component Value Date    NEUTROABS 14.75 (H) 12/20/2017         Results from last 7 days  Lab Units 12/20/17  0459   CREATININE mg/dL 1.01         Results from last 7 days  Lab Units 12/20/17  0459 12/19/17  0036 12/16/17  0544   CRP mg/dL 30.13* 31.29* <0.50       Imaging Results (last 24 hours)     Procedure Component Value Units Date/Time    US Renal Bilateral [[id number]] Collected:  12/20/17 0633     Updated:  12/20/17 0635    Narrative:       EXAMINATION: US RENAL BILATERAL-      CLINICAL INDICATION:     vidhi; R74.8-Abnormal levels of other serum  enzymes; J44.1-Chronic obstructive pulmonary disease with (acute)  exacerbation     TECHNIQUE: Multiplanar gray scale sonographic imaging of the kidneys.  Color Doppler implemented.     COMPARISON: NONE      FINDINGS:      RIGHT: The right kidney measures 10.0 cm in length. No mass,  hydronephrosis, or shadowing stone.     LEFT: The left kidney measures  8.6 cm in length. No mass,  hydronephrosis, or shadowing stone.       Impression:       Unremarkable bilateral renal ultrasound.      This report was finalized on 12/20/2017 6:33 AM by Dr. Eliseo Kidd MD.               Results Review:    I have personally reviewed laboratory data, culture results, radiology studies and antimicrobial therapy.    Hospital Medications (active)       Dose Frequency Start End    acetaminophen (TYLENOL) 160 MG/5ML solution 650 mg 650 mg Every 6 Hours PRN 12/18/2017     Sig - Route: 20.3 mL by Per G Tube route Every 6 (Six) Hours As Needed for Mild Pain , Moderate Pain  or Fever. - Per G Tube    albuterol (PROVENTIL) nebulizer solution 0.083% 2.5 mg/3mL 2.5 mg Every 6 Hours PRN 12/13/2017     Sig - Route: Take 2.5 mg by nebulization Every 6 (Six) Hours As Needed for Wheezing. - Nebulization    Notes to Pharmacy: Prior to [facility] Admission, Patient was on: 2 puffs every 4 to 6 hours as needed    aspirin EC tablet 81 mg 81 mg Daily 12/14/2017     Sig - Route: Take 1 tablet by mouth Daily. - Oral    diltiaZEM (CARDIZEM) 100 mg/100 mL (1 mg/mL) NS infusion (ADV) 10 mg/hr Titrated 12/15/2017     Sig - Route: Infuse 10 mg/hr into a venous catheter Dose Adjusted By Provider As Needed. - Intravenous    DOPamine 400 mg/250 mL (1.6 mg/mL) infusion 2-20 mcg/kg/min × 88.9 kg Titrated 12/15/2017     Sig - Route: Infuse 177.8-1,778 mcg/min into a venous catheter Dose Adjusted By Provider As Needed. - Intravenous    enoxaparin (LOVENOX) syringe 40 mg 40 mg Every 24 Hours 12/13/2017     Sig - Route: Inject 0.4 mL under the skin Daily. - Subcutaneous    famotidine (PEPCID) injection 20 mg 20 mg Every 12 Hours Scheduled 12/16/2017     Sig - Route: Infuse 2 mL into a venous catheter Every 12 (Twelve) Hours. - Intravenous    FENTANYL PCA 1500 MCG/30 ML (BHCOR) PCA  Continuous 12/15/2017 12/25/2017    Sig - Route: Infuse  into a venous catheter Continuous. - Intravenous    ipratropium-albuterol (DUO-NEB)  nebulizer solution 3 mL 3 mL Every 4 Hours PRN 12/15/2017     Sig - Route: Take 3 mL by nebulization Every 4 (Four) Hours As Needed for Wheezing or Shortness of Air. - Nebulization    ipratropium-albuterol (DUO-NEB) nebulizer solution 3 mL 3 mL 4 Times Daily - RT 12/15/2017     Sig - Route: Take 3 mL by nebulization 4 (Four) Times a Day. - Nebulization    methylPREDNISolone sodium succinate (SOLU-Medrol) injection 40 mg 40 mg Every 12 Hours 12/15/2017     Sig - Route: Infuse 1 mL into a venous catheter Every 12 (Twelve) Hours. - Intravenous    metoprolol tartrate (LOPRESSOR) injection 5 mg 5 mg Every 4 Hours PRN 12/15/2017     Sig - Route: Infuse 5 mL into a venous catheter Every 4 (Four) Hours As Needed (For SBP > 160 and HR > 60). - Intravenous    norepinephrine (LEVOPHED) 8,000 mcg in sodium chloride 0.9 % 250 mL (32 mcg/mL) infusion 0.02-0.3 mcg/kg/min × 88.9 kg Titrated 12/15/2017     Sig - Route: Infuse 1.778-26.67 mcg/min into a venous catheter Dose Adjusted By Provider As Needed. - Intravenous    piperacillin-tazobactam (ZOSYN) 3.375 g/100 mL 0.9% NS IVPB (mbp) 3.375 g Once 12/18/2017 12/18/2017    Sig - Route: Infuse 100 mL into a venous catheter 1 (One) Time. - Intravenous    Cosign for Ordering: Required by Angel Cortez MD    piperacillin-tazobactam (ZOSYN) 3.375 g/100 mL 0.9% NS IVPB (mbp) 3.375 g Every 8 Hours 12/19/2017 12/25/2017    Sig - Route: Infuse 100 mL into a venous catheter Every 8 (Eight) Hours. - Intravenous    Cosign for Ordering: Required by Angel Cortez MD    propofol (DIPRIVAN) infusion 10 mg/mL 100 mL 5-50 mcg/kg/min × 88.9 kg Titrated 12/15/2017     Sig - Route: Infuse 444.5-4,445 mcg/min into a venous catheter Dose Adjusted By Provider As Needed. - Intravenous    sodium chloride 0.9 % flush 1-10 mL 1-10 mL As Needed 12/13/2017     Sig - Route: Infuse 1-10 mL into a venous catheter As Needed for Line Care. - Intravenous    sodium chloride 0.9 % flush 10 mL 10 mL As Needed  "12/13/2017     Sig - Route: Infuse 10 mL into a venous catheter As Needed for Line Care. - Intravenous    Cosign for Ordering: Accepted by Kulwinder Cho MD on 12/13/2017  4:44 AM    Linked Group 1:  \"And\" Linked Group Details        sodium chloride 0.9 % flush 10 mL 10 mL Every 12 Hours Scheduled 12/15/2017     Sig - Route: 10 mL by Intracatheter route Every 12 (Twelve) Hours. - Intracatheter    Cosign for Ordering: Accepted by Nelson Ash MD on 12/17/2017  2:32 PM    sodium chloride 0.9 % flush 10 mL 10 mL As Needed 12/15/2017     Sig - Route: 10 mL by Intracatheter route As Needed for Line Care (After Medication Administration). - Intracatheter    Cosign for Ordering: Accepted by Nelson Ash MD on 12/17/2017  2:32 PM    vancomycin (VANCOCIN) 1,250 mg in sodium chloride 0.9 % 250 mL IVPB 1,250 mg Every 12 Hours 12/18/2017 12/28/2017    Sig - Route: Infuse 1,250 mg into a venous catheter Every 12 (Twelve) Hours. - Intravenous    vancomycin (VANCOCIN) 1,750 mg in sodium chloride 0.9 % 500 mL IVPB 1,750 mg Once 12/18/2017 12/18/2017    Sig - Route: Infuse 1,750 mg into a venous catheter 1 (One) Time. - Intravenous    vecuronium (NORCURON) injection 10 mg 10 mg Once 12/15/2017     Sig - Route: Infuse 10 mg into a venous catheter 1 (One) Time. - Intravenous    furosemide (LASIX) injection 20 mg (Discontinued) 20 mg Daily 12/16/2017 12/19/2017    Sig - Route: Infuse 2 mL into a venous catheter Daily. - Intravenous    lisinopril (PRINIVIL,ZESTRIL) tablet 20 mg (Discontinued) 20 mg Daily 12/14/2017 12/19/2017    Sig - Route: Take 2 tablets by mouth Daily. - Oral    spironolactone (ALDACTONE) tablet 25 mg (Discontinued) 25 mg Daily 12/13/2017 12/19/2017    Sig - Route: Take 1 tablet by mouth Daily. - Oral            Cultures:    Blood Culture   Date Value Ref Range Status   12/18/2017 No growth at less than 24 hours  Preliminary   12/18/2017 No growth at less than 24 hours  Preliminary   12/13/2017 No growth at " 5 days  Final   12/13/2017 No growth at 5 days  Final           Assessment/Plan     ASSESSMENT:    1.  Severe sepsis with acute respiratory failure  2.  Aspiration pneumonia     PLAN:    CRP level still elevated but showing mild improvement with improving leukocytosis as well as improved fever trend.  Negative Legionella urinary antigen and negative mycoplasma IgM.  2 positive blood cultures out of 2 from 12/18/2017 showing growth of gram-negative bacilli.  Repeat blood cultures ordered ×2 sets peripherally today.  Respiratory culture showing gram-negative bacilli as well.  On 60% FiO2.  For now would recommend to continue with current antibiotic therapy.  CBC and CRP ordered for a.m.     The patient is at high risk for nosocomial organisms and aspiration anaerobic infection and antibiotic therapy was escalated to vancomycin and Zosyn on 12/18/2017 with very close follow-up.  The patient carries a guarded prognosis.     Patient's findings and recommendations were discussed with nursing staff    Code Status: Full Code    Trupti Xie PA-C  12/20/17  12:40 PM

## 2017-12-20 NOTE — DISCHARGE PLACEMENT REQUEST
"LakevilleDeven naidu (72 y.o. Male)     Date of Birth Social Security Number Address Home Phone MRN    [id number]  [id number] [address] [phone] [id number]    Denominational Marital Status          Other        Admission Date Admission Type Admitting Provider Attending Provider Department, Room/Bed    12/13/17 Emergency Federico Macias MD Watts, Federico Hammond MD [facility] CRITICAL CARE, CC09/    Discharge Date Discharge Disposition Discharge Destination                      Attending Provider: Federico Macias MD     Allergies:  No Known Allergies    Isolation:  None   Infection:  None   Code Status:  FULL    Ht:  175.3 cm (69\")   Wt:  85.3 kg (188 lb)    Admission Cmt:  None   Principal Problem:  None                Active Insurance as of 12/13/2017     Primary Coverage     Payor Plan Insurance Group Employer/Plan Group    MEDICARE MEDICARE A & B      Payor Plan Address Payor Plan Phone Number Effective From Effective To    [address] [phone] 6/1/2012     [place] [address]       Subscriber Name Subscriber Birth Date Member ID       DEVEN CABA 1945 [id number]           Secondary Coverage     Payor Plan Insurance Group Employer/Plan Group    [place]      Payor Plan Address Payor Plan Phone Number Effective From Effective To    [address] [phone] 12/13/2017     [place] [address]       Subscriber Name Subscriber Birth Date Member ID       DEVEN CABA 1945 [id number]                 Emergency Contacts      (Rel.) Home Phone Work Phone Mobile Phone    Estevan Fernandez (Grandmarie) [phone] -- --            Emergency Contact Information     Name Relation Home Work Mobile    Estevan Fernandez [phone]            Insurance Information                MEDICARE/MEDICARE A & B Phone: [phone]    Subscriber: Deven Caba Subscriber#: [id number]    Group#:  Precert#:         [place] " [place] Phone: [phone]    Subscriber: Salomón Rodriguez Subscriber#: [id number]    Group#:  Precert#:              History & Physical      ROCK Crouch at 12/13/2017  6:57 AM          89        Salomón Rodriguez    Patient Care Team:  Federico Macias MD as PCP - General  Federico Macias MD as PCP - Family Medicine  Federico Macias MD as PCP - Claims Attributed    Chief complaint acute on chronic exacerbation of COPD                                 Subjective     Patient is a 72 y.o. male presents with exacerbation of COPD with increased cough congestion and wheezing patient denied any chest pain he was evaluated in the emergency room had a routine evaluation with the initial set of cardiac enzymes that were in the gray zone the patient continued for breathing treatments and IV antibiotics nebulizer treatments moved to the medical floor on telemetry secondary to his elevated enzymes and will be treated concurrently and follow patient does have a history of a past MI some 4 years ago according to the patient was found to have no blockages on valuation at that time.     Review of Systems   Pertinent items are noted in HPI, all other systems reviewed and negative    History  Past Medical History:   Diagnosis Date   • CHF (congestive heart failure)    • COPD (chronic obstructive pulmonary disease)    • Hypertension    • MI (myocardial infarction)    • Stroke      History reviewed. No pertinent surgical history.  History reviewed. No pertinent family history.  Social History   Substance Use Topics   • Smoking status: Former Smoker     Years: 55.00     Types: Cigarettes   • Smokeless tobacco: Current User      Comment: trying to quit   • Alcohol use No     Prescriptions Prior to Admission   Medication Sig Dispense Refill Last Dose   • albuterol (PROVENTIL HFA;VENTOLIN HFA) 108 (90 Base) MCG/ACT inhaler Inhale 2 puffs Every 4 (Four) Hours As Needed for Wheezing.      • aspirin 325 MG  tablet Take 325 mg by mouth Daily.      • furosemide (LASIX) 20 MG tablet Take 20 mg by mouth Daily.      • lisinopril (PRINIVIL,ZESTRIL) 10 MG tablet Take 10 mg by mouth Daily.      • metoprolol succinate XL (TOPROL-XL) 50 MG 24 hr tablet Take 50 mg by mouth Daily.      • predniSONE (DELTASONE) 10 MG tablet Take 10 mg by mouth Daily As Needed.      • spironolactone (ALDACTONE) 25 MG tablet Take 25 mg by mouth Daily.        Allergies:  Review of patient's allergies indicates no known allergies.    Objective     Vital Signs  Vital Signs (last 24 hours)       12/11 0700  -  12/12 0659 12/12 0700  -  12/13 0658   Most Recent    Temp (°F)   96.4 -  97.7     97.7 (36.5)    Heart Rate   65 -  (!)135     76    Resp   22 -  (!)30     22    BP   142/92 -  (!) 233/143     142/92    SpO2 (%)   (!)89 -  100     95            Physical Exam:      General Appearance:    Alert, cooperative, in no acute distress   Head:    Normocephalic, without obvious abnormality, atraumatic   Eyes:            Lids and lashes normal, conjunctivae and sclerae normal, no   icterus, no pallor, corneas clear, PERRLA   Ears:    Ears appear intact with no abnormalities noted   Throat:   No oral lesions, no thrush, oral mucosa moist   Neck:   No adenopathy, supple, trachea midline, no thyromegaly, no     carotid bruit, no JVD   Back:     No kyphosis present, no scoliosis present, no skin lesions,       erythema or scars, no tenderness to percussion or                   palpation,   range of motion normal   Lungs:     Clear to auscultation,respirations regular, even and                   unlabored    Heart:    Regular rhythm and normal rate, normal S1 and S2, no            murmur, no gallop, no rub, no click   Breast Exam:    Deferred   Abdomen:     Normal bowel sounds, no masses, no organomegaly, soft        non-tender, non-distended, no guarding, no rebound                 tenderness   Genitalia:    Deferred   Extremities:   Moves all extremities well,  no edema, no cyanosis, no              redness   Pulses:   Pulses palpable and equal bilaterally   Skin:   No bleeding, bruising or rash   Lymph nodes:   No palpable adenopathy   Neurologic:   Cranial nerves 2 - 12 grossly intact, sensation intact, DTR        present and equal bilaterally     LABS  Lab Results (last 24 hours)     Procedure Component Value Units Date/Time    Blood Gas, Arterial [[id number]]  (Abnormal) Collected:  12/13/17 0308    Specimen:  Arterial Blood Updated:  12/13/17 0310     Site Arterial: right radial     Ten's Test Positive     pH, Arterial 7.336 (L) pH units      pCO2, Arterial 43.2 mm Hg      pO2, Arterial 74.6 (L) mm Hg      HCO3, Arterial 22.6 mmol/L      Base Excess, Arterial -3.3 mmol/L      O2 Saturation, Arterial 94.8 %      Hemoglobin, Blood Gas 16.9 (H) g/dL      Hematocrit, Blood Gas 50.0 %      Oxyhemoglobin 93.4 %      Methemoglobin 0.30 %      Carboxyhemoglobin 1.2 %      A-a Gradiant 14.8 mmHg      Temperature 98.6 C      Barometric Pressure for Blood Gas 719 mmHg      Modality Room Air     FIO2 21 %     Blood Culture - Blood, [[id number]] Collected:  12/13/17 0313    Specimen:  Blood from Arm, Left Updated:  12/13/17 0318    CBC & Differential [[id number]] Collected:  12/13/17 0313    Specimen:  Blood Updated:  12/13/17 0324    Narrative:       The following orders were created for panel order CBC & Differential.  Procedure                               Abnormality         Status                     ---------                               -----------         ------                     CBC Auto Differential[[id number]]        Abnormal            Final result                 Please view results for these tests on the individual orders.    CBC Auto Differential [[id number]]  (Abnormal) Collected:  12/13/17 0313    Specimen:  Blood from Arm, Left Updated:  12/13/17 0324     WBC 12.96 (H) 10*3/mm3      RBC 4.98 10*6/mm3      Hemoglobin 16.1 g/dL      Hematocrit 47.8 %      MCV 96.0  (H) fL      MCH 32.3 pg      MCHC 33.7 g/dL      RDW 13.3 %      RDW-SD 45.2 fl      MPV 10.3 (H) fL      Platelets 186 10*3/mm3      Neutrophil % 55.9 %      Lymphocyte % 35.2 %      Monocyte % 6.2 %      Eosinophil % 2.3 %      Basophil % 0.2 %      Immature Grans % 0.2 %      Neutrophils, Absolute 7.24 (H) 10*3/mm3      Lymphocytes, Absolute 4.56 (H) 10*3/mm3      Monocytes, Absolute 0.80 10*3/mm3      Eosinophils, Absolute 0.30 10*3/mm3      Basophils, Absolute 0.03 10*3/mm3      Immature Grans, Absolute 0.03 10*3/mm3     Influenza Antigen, Rapid - Swab, Nasopharynx [[id number]]  (Normal) Collected:  12/13/17 0313    Specimen:  Swab from Nasopharynx Updated:  12/13/17 0331     Influenza A Ag, EIA Negative     Influenza B Ag, EIA Negative    Lactic Acid, Plasma [[id number]]  (Normal) Collected:  12/13/17 0313    Specimen:  Blood from Arm, Left Updated:  12/13/17 0347     Lactate 2.0 mmol/L     Blood Culture - Blood, [[id number]] Collected:  12/13/17 0348    Specimen:  Blood from Arm, Left Updated:  12/13/17 0351    Comprehensive Metabolic Panel [[id number]]  (Abnormal) Collected:  12/13/17 0313    Specimen:  Blood from Arm, Left Updated:  12/13/17 0353     Glucose 125 (H) mg/dL      BUN 19 mg/dL      Creatinine 0.97 mg/dL      Sodium 140 mmol/L      Potassium 4.2 mmol/L      Chloride 109 mmol/L      CO2 21.7 (L) mmol/L      Calcium 8.6 mg/dL      Total Protein 7.3 g/dL      Albumin 4.30 g/dL      ALT (SGPT) 41 U/L      AST (SGOT) 40 (H) U/L      Alkaline Phosphatase 75 U/L       Note New Reference Ranges        Total Bilirubin 0.4 mg/dL      eGFR Non African Amer 77 mL/min/1.73      Globulin 3.0 gm/dL      A/G Ratio 1.4 (L) g/dL      BUN/Creatinine Ratio 19.6     Anion Gap 9.3 mmol/L     Osmolality, Calculated [[id number]]  (Normal) Collected:  12/13/17 0313    Specimen:  Blood from Arm, Left Updated:  12/13/17 0353     Osmolality Calc 283.1 mOsm/kg     BNP [[id number]]  (Abnormal) Collected:  12/13/17 0313     Specimen:  Blood from Arm, Left Updated:  12/13/17 0359     .0 (H) pg/mL     Troponin [[id number]]  (Abnormal) Collected:  12/13/17 0313    Specimen:  Blood from Arm, Left Updated:  12/13/17 0401     Troponin I 0.106 (H) ng/mL     Narrative:       Ultra Troponin I Reference Range:         <=0.039 ng/mL: Negative    0.04-0.779 ng/mL: Indeterminate Range. Suspicious of MI.  Clinical correlation required.       >=0.78  ng/mL: Consistent with myocardial injury.  Clinical correlation required.    Urinalysis With / Culture If Indicated - Urine, Clean Catch [[id number]]  (Abnormal) Collected:  12/13/17 0524    Specimen:  Urine from Urine, Clean Catch Updated:  12/13/17 0537     Color, UA Yellow     Appearance, UA Clear     pH, UA <=5.0     Specific Gravity, UA 1.013     Glucose, UA Negative     Ketones, UA Negative     Bilirubin, UA Negative     Blood, UA Negative     Protein, UA 30 mg/dL (1+) (A)     Leuk Esterase, UA Negative     Nitrite, UA Negative     Urobilinogen, UA 0.2 E.U./dL    Urinalysis, Microscopic Only - Urine, Clean Catch [[id number]] Collected:  12/13/17 0524    Specimen:  Urine from Urine, Clean Catch Updated:  12/13/17 0537     RBC, UA 0-2 /HPF      WBC, UA 0-2 /HPF      Bacteria, UA None Seen /HPF      Squamous Epithelial Cells, UA None Seen /HPF      Hyaline Casts, UA None Seen /LPF      Methodology Automated Microscopy    Urine Drug Screen - Urine, Clean Catch [[id number]]  (Normal) Collected:  12/13/17 0524    Specimen:  Urine from Urine, Clean Catch Updated:  12/13/17 0550     Amphetamine Screen, Urine Negative     Barbiturates Screen, Urine Negative     Benzodiazepine Screen, Urine Negative     Cocaine Screen, Urine Negative     Methadone Screen, Urine Negative     Opiate Screen Negative     Phencyclidine (PCP), Urine Negative     THC, Screen, Urine Negative     6-ACETYL MORPHINE Negative     Buprenorphine, Screen, Urine Negative     Oxycodone Screen, Urine Negative    Narrative:       Negative  Thresholds For Drugs Screened:                  Amphetamines              1000 ng/ml               Barbiturates               200 ng/ml               Benzodiazepines            200 ng/ml              Cocaine                    300 ng/ml              Methadone                  300 ng/ml              Opiates                    300 ng/ml               Phencyclidine               25 ng/ml               THC                         50 ng/ml              6-Acetyl Morphine           10 ng/ml              Buprenorphine                5 ng/ml              Oxycodone                  300 ng/ml    The reference range for all drugs tested is negative. This report includes final unconfirmed qualitative results to be used for medical treatment purposes only. Unconfirmed results must not be used for non-medical purposes such as employment or legal testing. Clinical consideration should be applied to any drug of abuse test, especially when unconfirmed quantitative results are used.      CK [[id number]]  (Normal) Collected:  12/13/17 0517    Specimen:  Blood from Arm, Left Updated:  12/13/17 0604     Creatine Kinase 65 U/L     CK-MB [[id number]]  (Normal) Collected:  12/13/17 0517    Specimen:  Blood from Arm, Left Updated:  12/13/17 0604     CKMB 3.72 ng/mL     CK-MB Index [[id number]]  (Abnormal) Collected:  12/13/17 0517    Specimen:  Blood from Arm, Left Updated:  12/13/17 0604     CK-MB Index 5.7 (H) %     Troponin [[id number]]  (Abnormal) Collected:  12/13/17 0517    Specimen:  Blood from Arm, Left Updated:  12/13/17 0605     Troponin I 0.255 (H) ng/mL     Narrative:       Ultra Troponin I Reference Range:         <=0.039 ng/mL: Negative    0.04-0.779 ng/mL: Indeterminate Range. Suspicious of MI.  Clinical correlation required.       >=0.78  ng/mL: Consistent with myocardial injury.  Clinical correlation required.          RADIOLOGY  Imaging Results (last 24 hours)     Procedure Component Value Units Date/Time    XR Chest 2 View  [[id number]] Resulted:  12/13/17 0403     Updated:  12/13/17 0403          Results Review:    I reviewed the patient's new clinical results.    Active Problems:    Elevated troponin        Assessment/Plan     1.  Acute on chronic exacerbation of COPD  2.  Elevated cardiac enzyme gray zone  3.  Hypertension  4.  History of MI history of CVA  We'll go ahead and admit this patient follow him on telemetry we'll advance  diet as tolerated     ROCK Fleming  12/13/17  6:58 AM           Electronically signed by Nelson Ash MD at 12/13/2017  7:02 AM        Vital Signs (last 24 hours)       12/19 0700  -  12/20 0659 12/20 0700  -  12/20 1157   Most Recent    Temp (°F) 98.1 -  (!)100.7      99.2     99.2 (37.3)    Heart Rate 65 -  103    71 -  85     71    Resp 23 -  (!)30      (!)29     (!) 29    /63 -  166/80    136/63 -  171/87     171/87    SpO2 (%) 95 -  98    95 -  98     97          Intake & Output (last day)       12/19 0701 - 12/20 0700 12/20 0701 - 12/21 0700    I.V. (mL/kg) 822.6 (9.6)     NG/GT 1568     IV Piggyback 800 100    Total Intake(mL/kg) 3190.6 (37.4) 100 (1.2)    Urine (mL/kg/hr) 2030 (1)     Total Output 2030      Net +1160.6 +100              Hospital Medications (active)       Dose Frequency Start End    acetaminophen (TYLENOL) 160 MG/5ML solution 650 mg 650 mg Every 6 Hours PRN 12/18/2017     Sig - Route: 20.3 mL by Per G Tube route Every 6 (Six) Hours As Needed for Mild Pain , Moderate Pain  or Fever. - Per G Tube    albuterol (PROVENTIL) nebulizer solution 0.083% 2.5 mg/3mL 2.5 mg Every 6 Hours PRN 12/13/2017     Sig - Route: Take 2.5 mg by nebulization Every 6 (Six) Hours As Needed for Wheezing. - Nebulization    Notes to Pharmacy: Prior to [facility] Admission, Patient was on: 2 puffs every 4 to 6 hours as needed    aspirin EC tablet 81 mg 81 mg Daily 12/14/2017     Sig - Route: Take 1 tablet by mouth Daily. - Oral    diltiaZEM (CARDIZEM) 100 mg/100 mL (1 mg/mL) NS infusion (ADV) 10  mg/hr Titrated 12/15/2017     Sig - Route: Infuse 10 mg/hr into a venous catheter Dose Adjusted By Provider As Needed. - Intravenous    DOPamine 400 mg/250 mL (1.6 mg/mL) infusion 2-20 mcg/kg/min × 88.9 kg Titrated 12/15/2017     Sig - Route: Infuse 177.8-1,778 mcg/min into a venous catheter Dose Adjusted By Provider As Needed. - Intravenous    enoxaparin (LOVENOX) syringe 40 mg 40 mg Every 24 Hours 12/13/2017     Sig - Route: Inject 0.4 mL under the skin Daily. - Subcutaneous    famotidine (PEPCID) injection 20 mg 20 mg Every 12 Hours Scheduled 12/16/2017     Sig - Route: Infuse 2 mL into a venous catheter Every 12 (Twelve) Hours. - Intravenous    FENTANYL PCA 1500 MCG/30 ML (BHCOR) PCA  Continuous 12/15/2017 12/25/2017    Sig - Route: Infuse  into a venous catheter Continuous. - Intravenous    ipratropium-albuterol (DUO-NEB) nebulizer solution 3 mL 3 mL Every 4 Hours PRN 12/15/2017     Sig - Route: Take 3 mL by nebulization Every 4 (Four) Hours As Needed for Wheezing or Shortness of Air. - Nebulization    ipratropium-albuterol (DUO-NEB) nebulizer solution 3 mL 3 mL 4 Times Daily - RT 12/15/2017     Sig - Route: Take 3 mL by nebulization 4 (Four) Times a Day. - Nebulization    methylPREDNISolone sodium succinate (SOLU-Medrol) injection 40 mg 40 mg Every 12 Hours 12/15/2017     Sig - Route: Infuse 1 mL into a venous catheter Every 12 (Twelve) Hours. - Intravenous    metoprolol tartrate (LOPRESSOR) injection 5 mg 5 mg Every 4 Hours PRN 12/15/2017     Sig - Route: Infuse 5 mL into a venous catheter Every 4 (Four) Hours As Needed (For SBP > 160 and HR > 60). - Intravenous    norepinephrine (LEVOPHED) 8,000 mcg in sodium chloride 0.9 % 250 mL (32 mcg/mL) infusion 0.02-0.3 mcg/kg/min × 88.9 kg Titrated 12/15/2017     Sig - Route: Infuse 1.778-26.67 mcg/min into a venous catheter Dose Adjusted By Provider As Needed. - Intravenous    piperacillin-tazobactam (ZOSYN) 3.375 g/100 mL 0.9% NS IVPB (mbp) 3.375 g Every 8 Hours  "12/19/2017 12/25/2017    Sig - Route: Infuse 100 mL into a venous catheter Every 8 (Eight) Hours. - Intravenous    Cosign for Ordering: Required by Angel Cortez MD    propofol (DIPRIVAN) infusion 10 mg/mL 100 mL 5-50 mcg/kg/min × 88.9 kg Titrated 12/15/2017     Sig - Route: Infuse 444.5-4,445 mcg/min into a venous catheter Dose Adjusted By Provider As Needed. - Intravenous    sodium chloride 0.9 % flush 1-10 mL 1-10 mL As Needed 12/13/2017     Sig - Route: Infuse 1-10 mL into a venous catheter As Needed for Line Care. - Intravenous    sodium chloride 0.9 % flush 10 mL 10 mL As Needed 12/13/2017     Sig - Route: Infuse 10 mL into a venous catheter As Needed for Line Care. - Intravenous    Cosign for Ordering: Accepted by Kulwinder Cho MD on 12/13/2017  4:44 AM    Linked Group 1:  \"And\" Linked Group Details        sodium chloride 0.9 % flush 10 mL 10 mL Every 12 Hours Scheduled 12/15/2017     Sig - Route: 10 mL by Intracatheter route Every 12 (Twelve) Hours. - Intracatheter    Cosign for Ordering: Accepted by Nelson Ash MD on 12/17/2017  2:32 PM    sodium chloride 0.9 % flush 10 mL 10 mL As Needed 12/15/2017     Sig - Route: 10 mL by Intracatheter route As Needed for Line Care (After Medication Administration). - Intracatheter    Cosign for Ordering: Accepted by Nelson Ash MD on 12/17/2017  2:32 PM    sodium chloride 0.9 % infusion 75 mL/hr Once 12/19/2017 12/19/2017    Sig - Route: Infuse 75 mL/hr into a venous catheter 1 (One) Time. - Intravenous    vancomycin (VANCOCIN) 1,000 mg in sodium chloride 0.9 % 250 mL IVPB 1,000 mg Every 12 Hours 12/20/2017 12/27/2017    Sig - Route: Infuse 1,000 mg into a venous catheter Every 12 (Twelve) Hours. - Intravenous    vecuronium (NORCURON) injection 10 mg 10 mg Once 12/15/2017     Sig - Route: Infuse 10 mg into a venous catheter 1 (One) Time. - Intravenous    vancomycin (VANCOCIN) 1,250 mg in sodium chloride 0.9 % 250 mL IVPB (Discontinued) 1,250 mg Every " 12 Hours 12/18/2017 12/20/2017    Sig - Route: Infuse 1,250 mg into a venous catheter Every 12 (Twelve) Hours. - Intravenous            Lab Results (last 24 hours)     Procedure Component Value Units Date/Time    Troponin [[id number]]  (Abnormal) Collected:  12/19/17 1230    Specimen:  Blood Updated:  12/19/17 1351     Troponin I 0.089 (H) ng/mL     Narrative:       Ultra Troponin I Reference Range:         <=0.039 ng/mL: Negative    0.04-0.779 ng/mL: Indeterminate Range. Suspicious of MI.  Clinical correlation required.       >=0.78  ng/mL: Consistent with myocardial injury.  Clinical correlation required.    Troponin [[id number]]  (Abnormal) Collected:  12/19/17 1724    Specimen:  Blood Updated:  12/19/17 1807     Troponin I 0.103 (H) ng/mL     Narrative:       Ultra Troponin I Reference Range:         <=0.039 ng/mL: Negative    0.04-0.779 ng/mL: Indeterminate Range. Suspicious of MI.  Clinical correlation required.       >=0.78  ng/mL: Consistent with myocardial injury.  Clinical correlation required.    Troponin [[id number]]  (Abnormal) Collected:  12/20/17 0028    Specimen:  Blood Updated:  12/20/17 0150     Troponin I 0.111 (H) ng/mL     Narrative:       Ultra Troponin I Reference Range:         <=0.039 ng/mL: Negative    0.04-0.779 ng/mL: Indeterminate Range. Suspicious of MI.  Clinical correlation required.       >=0.78  ng/mL: Consistent with myocardial injury.  Clinical correlation required.    CBC Auto Differential [[id number]]  (Abnormal) Collected:  12/20/17 0459    Specimen:  Blood Updated:  12/20/17 0526     WBC 16.98 (H) 10*3/mm3      RBC 4.47 (L) 10*6/mm3      Hemoglobin 14.5 g/dL      Hematocrit 45.2 %      .1 (H) fL      MCH 32.4 pg      MCHC 32.1 (L) g/dL      RDW 13.1 %      RDW-SD 48.4 fl      MPV 10.4 (H) fL      Platelets 123 (L) 10*3/mm3      Neutrophil % 86.8 (H) %      Lymphocyte % 2.2 (L) %      Monocyte % 10.0 %      Eosinophil % 0.0 %      Basophil % 0.1 %      Immature Grans % 0.9  (H) %      Neutrophils, Absolute 14.75 (H) 10*3/mm3      Lymphocytes, Absolute 0.38 (L) 10*3/mm3      Monocytes, Absolute 1.69 (H) 10*3/mm3      Eosinophils, Absolute 0.00 10*3/mm3      Basophils, Absolute 0.01 10*3/mm3      Immature Grans, Absolute 0.15 (H) 10*3/mm3     CBC & Differential [[id number]] Collected:  12/20/17 0459    Specimen:  Blood Updated:  12/20/17 0550    Narrative:       The following orders were created for panel order CBC & Differential.  Procedure                               Abnormality         Status                     ---------                               -----------         ------                     Scan Slide[[id number]]                                       Final result               CBC Auto Differential[[id number]]        Abnormal            Final result                 Please view results for these tests on the individual orders.    Scan Slide [[id number]] Collected:  12/20/17 0459    Specimen:  Blood Updated:  12/20/17 0550     Macrocytes Slight/1+     Platelet Estimate Decreased    Comprehensive Metabolic Panel [[id number]]  (Abnormal) Collected:  12/20/17 0459    Specimen:  Blood Updated:  12/20/17 0554     Glucose 164 (H) mg/dL      BUN 39 (H) mg/dL      Creatinine 1.01 mg/dL      Sodium 145 mmol/L      Potassium 4.8 mmol/L      Chloride 111 mmol/L      CO2 29.5 mmol/L      Calcium 9.2 mg/dL      Total Protein 6.3 g/dL      Albumin 3.50 g/dL      ALT (SGPT) 37 U/L      AST (SGOT) 26 U/L      Alkaline Phosphatase 85 U/L       Note New Reference Ranges        Total Bilirubin 0.5 mg/dL      eGFR Non African Amer 73 mL/min/1.73      Globulin 2.8 gm/dL      A/G Ratio 1.3 (L) g/dL      BUN/Creatinine Ratio 38.6 (H)     Anion Gap 4.5 mmol/L     Osmolality, Calculated [[id number]]  (Normal) Collected:  12/20/17 0459    Specimen:  Blood Updated:  12/20/17 0554     Osmolality Calc 301.7 mOsm/kg     C-reactive Protein [[id number]]  (Abnormal) Collected:  12/20/17 0459    Specimen:  Blood  Updated:  12/20/17 0554     C-Reactive Protein 30.13 (H) mg/dL     Troponin [[id number]]  (Abnormal) Collected:  12/20/17 0459    Specimen:  Blood Updated:  12/20/17 0617     Troponin I 0.102 (H) ng/mL     Narrative:       Ultra Troponin I Reference Range:         <=0.039 ng/mL: Negative    0.04-0.779 ng/mL: Indeterminate Range. Suspicious of MI.  Clinical correlation required.       >=0.78  ng/mL: Consistent with myocardial injury.  Clinical correlation required.    Blood Culture - Blood, [[id number]] Collected:  12/20/17 0735    Specimen:  Blood from Hand, Left Updated:  12/20/17 0828    Respiratory Culture - Sputum, ET Suction [[id number]]  (Abnormal) Collected:  12/19/17 1034    Specimen:  Sputum from ET Suction Updated:  12/20/17 0829     Respiratory Culture --      Moderate growth (3+) Gram Negative Bacilli (A)     Gram Stain Result Moderate (3+) WBCs seen      Few (2+) Gram negative bacilli      Occasional Gram positive cocci in pairs    Blood Culture - Blood, [[id number]]  (Abnormal) Collected:  12/18/17 1533    Specimen:  Blood from Arm, Right Updated:  12/20/17 0841     Blood Culture --      Gram Negative Bacilli (A)     Isolated from --     Gram Stain Result Gram negative bacilli    Blood Culture - Blood, [[id number]]  (Abnormal) Collected:  12/18/17 1611    Specimen:  Blood from Arm, Right Updated:  12/20/17 0841     Blood Culture --      Gram Negative Bacilli (A)     Isolated from --     Gram Stain Result Gram negative bacilli    Vancomycin, Trough [[id number]]  (Abnormal) Collected:  12/20/17 0915    Specimen:  Blood Updated:  12/20/17 0956     Vancomycin Trough 21.20 (H) mcg/mL     Blood Culture - Blood, [[id number]] Collected:  12/20/17 0915    Specimen:  Blood from Arm, Left Updated:  12/20/17 1141        Imaging Results (last 24 hours)     Procedure Component Value Units Date/Time    US Renal Bilateral [[id number]] Collected:  12/20/17 0633     Updated:  12/20/17 0635    Narrative:       EXAMINATION: US  RENAL BILATERAL-      CLINICAL INDICATION:     vidhi; R74.8-Abnormal levels of other serum  enzymes; J44.1-Chronic obstructive pulmonary disease with (acute)  exacerbation     TECHNIQUE: Multiplanar gray scale sonographic imaging of the kidneys.  Color Doppler implemented.     COMPARISON: NONE      FINDINGS:      RIGHT: The right kidney measures 10.0 cm in length. No mass,  hydronephrosis, or shadowing stone.     LEFT: The left kidney measures 8.6 cm in length. No mass,  hydronephrosis, or shadowing stone.       Impression:       Unremarkable bilateral renal ultrasound.      This report was finalized on 12/20/2017 6:33 AM by Dr. Eliseo Kidd MD.           Operative/Procedure Notes (last 24 hours) (Notes from 12/19/2017 11:57 AM through 12/20/2017 11:57 AM)     No notes of this type exist for this encounter.           Physician Progress Notes (last 24 hours) (Notes from 12/19/2017 11:57 AM through 12/20/2017 11:57 AM)      Federico Macias MD at 12/20/2017  6:28 AM  Version 1 of 1         Salomón Rodriguez 72 y.o.   12/20/17    Subjective: Patient currently resting on a ventilator with sedation and vital signs are all stable  Objective: Pulse unstable in the mid 90s abdomen soft extremities no edema  Vital Signs (last 72 hrs)       12/16 0700  -  12/17 0659 12/17 0700  -  12/18 0659 12/18 0700  -  12/19 0659 12/19 0700  -  12/20 0628   Most Recent    Temp (°F) 98 -  99.4    98.7 -  100    98.2 -  (!)101.4    98.1 -  (!)100.7     99.9 (37.7)    Heart Rate (!)46 -  66    55 -  79    63 -  104    65 -  103     68    Resp   22    22 -  26    22 -  (!)34    23 -  (!)30     26    /64 -  166/88    119/59 -  148/78    120/66 -  168/82    123/63 -  166/80     129/63    SpO2 (%) (!)89 -  96    (!)83 -  94    (!)84 -  99    95 -  98     98        Lab Results (last 72 hours)     Procedure Component Value Units Date/Time    Blood Gas, Arterial [[id number]]  (Abnormal) Collected:  12/17/17 0728    Specimen:  Arterial Blood  Updated:  12/17/17 0740     Site Arterial: right radial     Ten's Test Positive     pH, Arterial 7.377 pH units      pCO2, Arterial 50.6 (C) mm Hg      pO2, Arterial 69.0 (L) mm Hg      HCO3, Arterial 29.1 (H) mmol/L      Base Excess, Arterial 2.7 mmol/L      O2 Saturation, Arterial 93.0 %      Hemoglobin, Blood Gas 16.9 (H) g/dL      Hematocrit, Blood Gas 50.0 %      Oxyhemoglobin 92.0 %      Methemoglobin 0.40 %      Carboxyhemoglobin 0.7 %      A-a Gradiant 283.3 mmHg      Temperature 98.6 C      Barometric Pressure for Blood Gas 727 mmHg      Modality Ventilator     FIO2 60 %      Ventilator Mode ac     Set Tidal Volume 450     Set Mech Resp Rate 22     PEEP 5    Troponin [[id number]]  (Abnormal) Collected:  12/17/17 1154    Specimen:  Blood Updated:  12/17/17 1259     Troponin I 0.247 (H) ng/mL     Narrative:       Ultra Troponin I Reference Range:         <=0.039 ng/mL: Negative    0.04-0.779 ng/mL: Indeterminate Range. Suspicious of MI.  Clinical correlation required.       >=0.78  ng/mL: Consistent with myocardial injury.  Clinical correlation required.    Troponin [[id number]]  (Abnormal) Collected:  12/17/17 1716    Specimen:  Blood Updated:  12/17/17 1812     Troponin I 0.292 (H) ng/mL     Narrative:       Ultra Troponin I Reference Range:         <=0.039 ng/mL: Negative    0.04-0.779 ng/mL: Indeterminate Range. Suspicious of MI.  Clinical correlation required.       >=0.78  ng/mL: Consistent with myocardial injury.  Clinical correlation required.    CBC & Differential [[id number]] Collected:  12/18/17 0049    Specimen:  Blood Updated:  12/18/17 0131    Narrative:       The following orders were created for panel order CBC & Differential.  Procedure                               Abnormality         Status                     ---------                               -----------         ------                     CBC Auto Differential[[id number]]        Abnormal            Final result                 Please  view results for these tests on the individual orders.    CBC Auto Differential [[id number]]  (Abnormal) Collected:  12/18/17 0049    Specimen:  Blood Updated:  12/18/17 0131     WBC 18.06 (H) 10*3/mm3      RBC 5.24 10*6/mm3      Hemoglobin 17.0 g/dL      Hematocrit 51.4 %      MCV 98.1 (H) fL      MCH 32.4 pg      MCHC 33.1 g/dL      RDW 13.2 %      RDW-SD 47.2 fl      MPV 10.2 (H) fL      Platelets 137 10*3/mm3      Neutrophil % 92.1 (H) %      Lymphocyte % 2.9 (L) %      Monocyte % 4.7 %      Eosinophil % 0.1 %      Basophil % 0.0 %      Immature Grans % 0.2 %      Neutrophils, Absolute 16.64 (H) 10*3/mm3      Lymphocytes, Absolute 0.52 (L) 10*3/mm3      Monocytes, Absolute 0.85 10*3/mm3      Eosinophils, Absolute 0.01 10*3/mm3      Basophils, Absolute 0.00 10*3/mm3      Immature Grans, Absolute 0.04 (H) 10*3/mm3     Troponin [[id number]]  (Abnormal) Collected:  12/18/17 0049    Specimen:  Blood Updated:  12/18/17 0144     Troponin I 0.208 (H) ng/mL     Narrative:       Ultra Troponin I Reference Range:         <=0.039 ng/mL: Negative    0.04-0.779 ng/mL: Indeterminate Range. Suspicious of MI.  Clinical correlation required.       >=0.78  ng/mL: Consistent with myocardial injury.  Clinical correlation required.    Blood Culture - Blood, [[id number]]  (Normal) Collected:  12/13/17 0313    Specimen:  Blood from Arm, Left Updated:  12/18/17 0331     Blood Culture No growth at 5 days    Blood Culture - Blood, [[id number]]  (Normal) Collected:  12/13/17 0348    Specimen:  Blood from Arm, Left Updated:  12/18/17 0401     Blood Culture No growth at 5 days    CBC & Differential [[id number]] Collected:  12/18/17 0537    Specimen:  Blood Updated:  12/18/17 0547    Narrative:       The following orders were created for panel order CBC & Differential.  Procedure                               Abnormality         Status                     ---------                               -----------         ------                     CBC  Auto Differential[[id number]]        Abnormal            Final result                 Please view results for these tests on the individual orders.    CBC Auto Differential [[id number]]  (Abnormal) Collected:  12/18/17 0537    Specimen:  Blood Updated:  12/18/17 0547     WBC 16.57 (H) 10*3/mm3      RBC 4.92 10*6/mm3      Hemoglobin 16.1 g/dL      Hematocrit 48.6 %      MCV 98.8 (H) fL      MCH 32.7 pg      MCHC 33.1 g/dL      RDW 13.1 %      RDW-SD 46.4 fl      MPV 9.8 fL      Platelets 121 (L) 10*3/mm3      Neutrophil % 91.2 (H) %      Lymphocyte % 4.2 (L) %      Monocyte % 4.3 %      Eosinophil % 0.0 %      Basophil % 0.1 %      Immature Grans % 0.2 %      Neutrophils, Absolute 15.11 (H) 10*3/mm3      Lymphocytes, Absolute 0.69 (L) 10*3/mm3      Monocytes, Absolute 0.72 10*3/mm3      Eosinophils, Absolute 0.00 10*3/mm3      Basophils, Absolute 0.01 10*3/mm3      Immature Grans, Absolute 0.04 (H) 10*3/mm3     Comprehensive Metabolic Panel [[id number]]  (Abnormal) Collected:  12/18/17 0537    Specimen:  Blood Updated:  12/18/17 0614     Glucose 124 (H) mg/dL      BUN 38 (H) mg/dL      Creatinine 1.13 mg/dL      Sodium 142 mmol/L      Potassium 4.0 mmol/L      Chloride 105 mmol/L      CO2 29.6 mmol/L      Calcium 8.6 mg/dL      Total Protein 6.4 g/dL      Albumin 3.70 g/dL      ALT (SGPT) 56 (H) U/L      AST (SGOT) 19 U/L      Alkaline Phosphatase 56 U/L       Note New Reference Ranges        Total Bilirubin 0.7 mg/dL      eGFR Non African Amer 64 mL/min/1.73      Globulin 2.7 gm/dL      A/G Ratio 1.4 (L) g/dL      BUN/Creatinine Ratio 33.6 (H)     Anion Gap 7.4 mmol/L     Osmolality, Calculated [[id number]]  (Normal) Collected:  12/18/17 0537    Specimen:  Blood Updated:  12/18/17 0614     Osmolality Calc 293.6 mOsm/kg     Troponin [[id number]]  (Abnormal) Collected:  12/18/17 0537    Specimen:  Blood Updated:  12/18/17 0626     Troponin I 0.177 (H) ng/mL     Narrative:       Ultra Troponin I Reference Range:          <=0.039 ng/mL: Negative    0.04-0.779 ng/mL: Indeterminate Range. Suspicious of MI.  Clinical correlation required.       >=0.78  ng/mL: Consistent with myocardial injury.  Clinical correlation required.    Blood Gas, Arterial [[id number]]  (Abnormal) Collected:  12/18/17 0807    Specimen:  Arterial Blood Updated:  12/18/17 0833     Site Arterial: right radial     Ten's Test Positive     pH, Arterial 7.434 pH units      pCO2, Arterial 43.6 mm Hg      pO2, Arterial 80.2 mm Hg      HCO3, Arterial 28.6 (H) mmol/L      Base Excess, Arterial 3.7 mmol/L      O2 Saturation, Arterial 96.4 %      Hemoglobin, Blood Gas 16.8 (H) g/dL      Hematocrit, Blood Gas 49.0 %      Oxyhemoglobin 95.2 %      Methemoglobin 0.50 %      Carboxyhemoglobin 0.7 %      A-a Gradiant 279.8 mmHg      Temperature 98.6 C      Barometric Pressure for Blood Gas 727 mmHg      Modality Ventilator     FIO2 60 %      Ventilator Mode ac     Set Tidal Volume 450     Set Mech Resp Rate 22     PEEP 5    Urinalysis With / Culture If Indicated - Urine, Clean Catch [[id number]]  (Normal) Collected:  12/18/17 0852    Specimen:  Urine from Urine, Catheter Updated:  12/18/17 0937     Color, UA Yellow     Appearance, UA Clear     pH, UA <=5.0     Specific Gravity, UA 1.021     Glucose, UA Negative     Ketones, UA Negative     Bilirubin, UA Negative     Blood, UA Negative     Protein, UA Negative     Leuk Esterase, UA Negative     Nitrite, UA Negative     Urobilinogen, UA 0.2 E.U./dL    Narrative:       Urine microscopic not indicated.    Troponin [[id number]]  (Abnormal) Collected:  12/18/17 1204    Specimen:  Blood Updated:  12/18/17 1300     Troponin I 0.192 (H) ng/mL     Narrative:       Ultra Troponin I Reference Range:         <=0.039 ng/mL: Negative    0.04-0.779 ng/mL: Indeterminate Range. Suspicious of MI.  Clinical correlation required.       >=0.78  ng/mL: Consistent with myocardial injury.  Clinical correlation required.    Troponin [[id number]]  (Abnormal)  Collected:  12/18/17 1721    Specimen:  Blood Updated:  12/18/17 1818     Troponin I 0.215 (H) ng/mL     Narrative:       Ultra Troponin I Reference Range:         <=0.039 ng/mL: Negative    0.04-0.779 ng/mL: Indeterminate Range. Suspicious of MI.  Clinical correlation required.       >=0.78  ng/mL: Consistent with myocardial injury.  Clinical correlation required.    Mycoplasma Pneumoniae Antibody, IgM [[id number]]  (Normal) Collected:  12/18/17 1727    Specimen:  Blood Updated:  12/18/17 1855     Mycoplasma pneumo IgM Negative    Legionella Antigen, Urine - Urine, Clean Catch [[id number]]  (Normal) Collected:  12/18/17 1755    Specimen:  Urine from Urine, Catheter Updated:  12/18/17 1937     LEGIONELLA ANTIGEN, URINE Negative    Narrative:         Presumptive negative for L. pneumophilia serogroup 1 antigen, suggesting no recent or current infection.    Troponin [[id number]]  (Abnormal) Collected:  12/19/17 0036    Specimen:  Blood Updated:  12/19/17 0154     Troponin I 0.147 (H) ng/mL     Narrative:       Ultra Troponin I Reference Range:         <=0.039 ng/mL: Negative    0.04-0.779 ng/mL: Indeterminate Range. Suspicious of MI.  Clinical correlation required.       >=0.78  ng/mL: Consistent with myocardial injury.  Clinical correlation required.    C-reactive Protein [[id number]]  (Abnormal) Collected:  12/19/17 0036    Specimen:  Blood Updated:  12/19/17 0157     C-Reactive Protein 31.29 (H) mg/dL     CBC & Differential [[id number]] Collected:  12/19/17 0517    Specimen:  Blood Updated:  12/19/17 0528    Narrative:       The following orders were created for panel order CBC & Differential.  Procedure                               Abnormality         Status                     ---------                               -----------         ------                     CBC Auto Differential[[id number]]        Abnormal            Final result                 Please view results for these tests on the individual orders.    CBC  Auto Differential [[id number]]  (Abnormal) Collected:  12/19/17 0517    Specimen:  Blood Updated:  12/19/17 0528     WBC 18.67 (H) 10*3/mm3      RBC 4.51 (L) 10*6/mm3      Hemoglobin 14.8 g/dL      Hematocrit 45.4 %      .7 (H) fL      MCH 32.8 pg      MCHC 32.6 (L) g/dL      RDW 13.1 %      RDW-SD 48.0 fl      MPV 10.1 (H) fL      Platelets 103 (L) 10*3/mm3      Neutrophil % 90.9 (H) %      Lymphocyte % 3.5 (L) %      Monocyte % 4.9 %      Eosinophil % 0.1 %      Basophil % 0.1 %      Immature Grans % 0.5 %      Neutrophils, Absolute 16.99 (H) 10*3/mm3      Lymphocytes, Absolute 0.65 (L) 10*3/mm3      Monocytes, Absolute 0.92 (H) 10*3/mm3      Eosinophils, Absolute 0.01 10*3/mm3      Basophils, Absolute 0.01 10*3/mm3      Immature Grans, Absolute 0.09 (H) 10*3/mm3     Comprehensive Metabolic Panel [[id number]]  (Abnormal) Collected:  12/19/17 0517    Specimen:  Blood Updated:  12/19/17 0553     Glucose 169 (H) mg/dL      BUN 50 (H) mg/dL      Creatinine 1.52 (H) mg/dL      Sodium 143 mmol/L      Potassium 4.2 mmol/L      Chloride 108 mmol/L      CO2 30.0 mmol/L      Calcium 9.1 mg/dL      Total Protein 6.4 g/dL      Albumin 3.60 g/dL      ALT (SGPT) 32 U/L      AST (SGOT) 9 (L) U/L      Alkaline Phosphatase 63 U/L       Note New Reference Ranges        Total Bilirubin 0.6 mg/dL      eGFR Non African Amer 46 (L) mL/min/1.73      Globulin 2.8 gm/dL      A/G Ratio 1.3 (L) g/dL      BUN/Creatinine Ratio 32.9 (H)     Anion Gap 5.0 mmol/L     Osmolality, Calculated [[id number]]  (Normal) Collected:  12/19/17 0517    Specimen:  Blood Updated:  12/19/17 0553     Osmolality Calc 302.2 mOsm/kg     Troponin [[id number]]  (Abnormal) Collected:  12/19/17 0517    Specimen:  Blood Updated:  12/19/17 0615     Troponin I 0.131 (H) ng/mL     Narrative:       Ultra Troponin I Reference Range:         <=0.039 ng/mL: Negative    0.04-0.779 ng/mL: Indeterminate Range. Suspicious of MI.  Clinical correlation required.       >=0.78   ng/mL: Consistent with myocardial injury.  Clinical correlation required.    Sodium, Urine, Random - Urine, Clean Catch [[id number]] Collected:  12/19/17 1029    Specimen:  Urine from Urine, Catheter Updated:  12/19/17 1050     Sodium, Urine 47 mmol/L     Creatinine, Urine, Random - Urine, Clean Catch [[id number]] Collected:  12/19/17 1029    Specimen:  Urine from Urine, Catheter Updated:  12/19/17 1050     Creatinine, Urine 88.1 mg/dL     Blood Culture - Blood, [[id number]]  (Abnormal) Collected:  12/18/17 1611    Specimen:  Blood from Arm, Right Updated:  12/19/17 1134     Blood Culture Abnormal Stain (A)     Gram Stain Result Gram negative bacilli    Blood Culture - Blood, [[id number]]  (Abnormal) Collected:  12/18/17 1533    Specimen:  Blood from Arm, Right Updated:  12/19/17 1134     Blood Culture Abnormal Stain (A)     Gram Stain Result Gram negative bacilli    Troponin [[id number]]  (Abnormal) Collected:  12/19/17 1230    Specimen:  Blood Updated:  12/19/17 1351     Troponin I 0.089 (H) ng/mL     Narrative:       Ultra Troponin I Reference Range:         <=0.039 ng/mL: Negative    0.04-0.779 ng/mL: Indeterminate Range. Suspicious of MI.  Clinical correlation required.       >=0.78  ng/mL: Consistent with myocardial injury.  Clinical correlation required.    Troponin [[id number]]  (Abnormal) Collected:  12/19/17 1724    Specimen:  Blood Updated:  12/19/17 1807     Troponin I 0.103 (H) ng/mL     Narrative:       Ultra Troponin I Reference Range:         <=0.039 ng/mL: Negative    0.04-0.779 ng/mL: Indeterminate Range. Suspicious of MI.  Clinical correlation required.       >=0.78  ng/mL: Consistent with myocardial injury.  Clinical correlation required.    Respiratory Culture - Sputum, ET Suction [[id number]] Collected:  12/19/17 1034    Specimen:  Sputum from ET Suction Updated:  12/19/17 2251     Gram Stain Result Moderate (3+) WBCs seen      Few (2+) Gram negative bacilli      Occasional Gram positive cocci in  pairs    Troponin [[id number]]  (Abnormal) Collected:  12/20/17 0028    Specimen:  Blood Updated:  12/20/17 0150     Troponin I 0.111 (H) ng/mL     Narrative:       Ultra Troponin I Reference Range:         <=0.039 ng/mL: Negative    0.04-0.779 ng/mL: Indeterminate Range. Suspicious of MI.  Clinical correlation required.       >=0.78  ng/mL: Consistent with myocardial injury.  Clinical correlation required.    CBC Auto Differential [[id number]]  (Abnormal) Collected:  12/20/17 0459    Specimen:  Blood Updated:  12/20/17 0526     WBC 16.98 (H) 10*3/mm3      RBC 4.47 (L) 10*6/mm3      Hemoglobin 14.5 g/dL      Hematocrit 45.2 %      .1 (H) fL      MCH 32.4 pg      MCHC 32.1 (L) g/dL      RDW 13.1 %      RDW-SD 48.4 fl      MPV 10.4 (H) fL      Platelets 123 (L) 10*3/mm3      Neutrophil % 86.8 (H) %      Lymphocyte % 2.2 (L) %      Monocyte % 10.0 %      Eosinophil % 0.0 %      Basophil % 0.1 %      Immature Grans % 0.9 (H) %      Neutrophils, Absolute 14.75 (H) 10*3/mm3      Lymphocytes, Absolute 0.38 (L) 10*3/mm3      Monocytes, Absolute 1.69 (H) 10*3/mm3      Eosinophils, Absolute 0.00 10*3/mm3      Basophils, Absolute 0.01 10*3/mm3      Immature Grans, Absolute 0.15 (H) 10*3/mm3     CBC & Differential [[id number]] Collected:  12/20/17 0459    Specimen:  Blood Updated:  12/20/17 0550    Narrative:       The following orders were created for panel order CBC & Differential.  Procedure                               Abnormality         Status                     ---------                               -----------         ------                     Scan Slide[[id number]]                                       Final result               CBC Auto Differential[[id number]]        Abnormal            Final result                 Please view results for these tests on the individual orders.    Scan Slide [[id number]] Collected:  12/20/17 0459    Specimen:  Blood Updated:  12/20/17 0550     Macrocytes Slight/1+     Platelet  Estimate Decreased    Comprehensive Metabolic Panel [[id number]]  (Abnormal) Collected:  12/20/17 0459    Specimen:  Blood Updated:  12/20/17 0554     Glucose 164 (H) mg/dL      BUN 39 (H) mg/dL      Creatinine 1.01 mg/dL      Sodium 145 mmol/L      Potassium 4.8 mmol/L      Chloride 111 mmol/L      CO2 29.5 mmol/L      Calcium 9.2 mg/dL      Total Protein 6.3 g/dL      Albumin 3.50 g/dL      ALT (SGPT) 37 U/L      AST (SGOT) 26 U/L      Alkaline Phosphatase 85 U/L       Note New Reference Ranges        Total Bilirubin 0.5 mg/dL      eGFR Non African Amer 73 mL/min/1.73      Globulin 2.8 gm/dL      A/G Ratio 1.3 (L) g/dL      BUN/Creatinine Ratio 38.6 (H)     Anion Gap 4.5 mmol/L     Osmolality, Calculated [[id number]]  (Normal) Collected:  12/20/17 0459    Specimen:  Blood Updated:  12/20/17 0554     Osmolality Calc 301.7 mOsm/kg     C-reactive Protein [[id number]]  (Abnormal) Collected:  12/20/17 0459    Specimen:  Blood Updated:  12/20/17 0554     C-Reactive Protein 30.13 (H) mg/dL     Troponin [[id number]]  (Abnormal) Collected:  12/20/17 0459    Specimen:  Blood Updated:  12/20/17 0617     Troponin I 0.102 (H) ng/mL     Narrative:       Ultra Troponin I Reference Range:         <=0.039 ng/mL: Negative    0.04-0.779 ng/mL: Indeterminate Range. Suspicious of MI.  Clinical correlation required.       >=0.78  ng/mL: Consistent with myocardial injury.  Clinical correlation required.        Imaging Results (last 24 hours)     Procedure Component Value Units Date/Time    US Renal Bilateral [[id number]] Updated:  12/19/17 1622        Assessment:Active Problems:    Elevated troponin   Acute on chronic respiratory failure  Aspiration pneumonia/sepsis  Nonoliguric AKA  Systolic congestive heart failure  Plan: Lisinopril and spironolactone have been DC'd but remove nephrotoxic medications   and by Elvin continued for robotic coverage  Patient with a chronic systolic congestive heart failure due to nonischemic  cardiomyopathy a currently intubated but but is now off vasopressors with indeterminate troponin levels and will continue with diuresis     Electronically signed by Federico Macias MD at 12/20/2017  6:33 AM

## 2017-12-20 NOTE — PLAN OF CARE
Problem: Patient Care Overview (Adult)  Goal: Plan of Care Review  Outcome: Ongoing (interventions implemented as appropriate)    Problem: Pain, Acute (Adult)  Goal: Acceptable Pain Control/Comfort Level  Outcome: Ongoing (interventions implemented as appropriate)    Problem: Cardiac Output, Decreased (Adult)  Goal: Adequate Cardiac Output/Effective Tissue Perfusion  Outcome: Ongoing (interventions implemented as appropriate)    Problem: Skin Integrity Impairment, Risk/Actual (Adult)  Goal: Identify Related Risk Factors and Signs and Symptoms  Outcome: Ongoing (interventions implemented as appropriate)  Goal: Skin Integrity/Wound Healing  Outcome: Ongoing (interventions implemented as appropriate)    Problem: Respiratory Insufficiency (Adult)  Goal: Identify Related Risk Factors and Signs and Symptoms  Outcome: Ongoing (interventions implemented as appropriate)  Goal: Acid/Base Balance  Outcome: Ongoing (interventions implemented as appropriate)  Goal: Effective Ventilation  Outcome: Ongoing (interventions implemented as appropriate)    Problem: Pressure Ulcer Risk (Junior Scale) (Adult,Obstetrics,Pediatric)  Goal: Identify Related Risk Factors and Signs and Symptoms  Outcome: Ongoing (interventions implemented as appropriate)  Goal: Skin Integrity  Outcome: Ongoing (interventions implemented as appropriate)    Problem: Mechanical Ventilation, Invasive (Adult)  Goal: Signs and Symptoms of Listed Potential Problems Will be Absent or Manageable (Mechanical Ventilation, Invasive)  Outcome: Ongoing (interventions implemented as appropriate)    Problem: Pressure Ulcer (Adult)  Goal: Signs and Symptoms of Listed Potential Problems Will be Absent or Manageable (Pressure Ulcer)  Outcome: Ongoing (interventions implemented as appropriate)

## 2017-12-21 LAB
A-A DO2: >300 MMHG (ref 0–300)
ALBUMIN SERPL-MCNC: 3.8 G/DL (ref 3.4–4.8)
ALBUMIN/GLOB SERPL: 1.2 G/DL (ref 1.5–2.5)
ALP SERPL-CCNC: 138 U/L (ref 40–129)
ALT SERPL W P-5'-P-CCNC: 55 U/L (ref 10–44)
ANION GAP SERPL CALCULATED.3IONS-SCNC: 5.6 MMOL/L (ref 3.6–11.2)
ARTERIAL PATENCY WRIST A: ABNORMAL
AST SERPL-CCNC: 37 U/L (ref 10–34)
ATMOSPHERIC PRESS: 719 MMHG
BACTERIA SPEC AEROBE CULT: ABNORMAL
BASE EXCESS BLDA CALC-SCNC: 3.6 MMOL/L
BASOPHILS # BLD AUTO: 0.04 10*3/MM3 (ref 0–0.3)
BASOPHILS NFR BLD AUTO: 0.2 % (ref 0–2)
BDY SITE: ABNORMAL
BILIRUB SERPL-MCNC: 1.2 MG/DL (ref 0.2–1.8)
BODY TEMPERATURE: 98.6 C
BUN BLD-MCNC: 41 MG/DL (ref 7–21)
BUN/CREAT SERPL: 36.3 (ref 7–25)
CALCIUM SPEC-SCNC: 9.8 MG/DL (ref 7.7–10)
CHLORIDE SERPL-SCNC: 108 MMOL/L (ref 99–112)
CHOLEST SERPL-MCNC: 168 MG/DL (ref 0–200)
CO2 SERPL-SCNC: 32.4 MMOL/L (ref 24.3–31.9)
COHGB MFR BLD: 1.4 % (ref 0–5)
CREAT BLD-MCNC: 1.13 MG/DL (ref 0.43–1.29)
DEPRECATED RDW RBC AUTO: 48.3 FL (ref 37–54)
EOSINOPHIL # BLD AUTO: 0 10*3/MM3 (ref 0–0.7)
EOSINOPHIL NFR BLD AUTO: 0 % (ref 0–7)
ERYTHROCYTE [DISTWIDTH] IN BLOOD BY AUTOMATED COUNT: 13.1 % (ref 11.5–14.5)
GFR SERPL CREATININE-BSD FRML MDRD: 64 ML/MIN/1.73
GLOBULIN UR ELPH-MCNC: 3.2 GM/DL
GLUCOSE BLD-MCNC: 129 MG/DL (ref 70–110)
GRAM STN SPEC: ABNORMAL
GRAM STN SPEC: ABNORMAL
HCO3 BLDA-SCNC: 28.3 MMOL/L (ref 22–26)
HCT VFR BLD AUTO: 47.8 % (ref 42–52)
HCT VFR BLD CALC: 46 % (ref 42–52)
HDLC SERPL-MCNC: 32 MG/DL (ref 60–100)
HGB BLD-MCNC: 15.3 G/DL (ref 14–18)
HGB BLDA-MCNC: 15.6 G/DL (ref 12–16)
HOROWITZ INDEX BLD+IHG-RTO: 60 %
IMM GRANULOCYTES # BLD: 0.07 10*3/MM3 (ref 0–0.03)
IMM GRANULOCYTES NFR BLD: 0.4 % (ref 0–0.5)
ISOLATED FROM: ABNORMAL
ISOLATED FROM: ABNORMAL
LDLC SERPL CALC-MCNC: 92 MG/DL (ref 0–100)
LDLC/HDLC SERPL: 2.88 {RATIO}
LYMPHOCYTES # BLD AUTO: 0.56 10*3/MM3 (ref 1–3)
LYMPHOCYTES NFR BLD AUTO: 3.1 % (ref 16–46)
MCH RBC QN AUTO: 32.3 PG (ref 27–33)
MCHC RBC AUTO-ENTMCNC: 32 G/DL (ref 33–37)
MCV RBC AUTO: 100.8 FL (ref 80–94)
METHGB BLD QL: 0.4 % (ref 0–3)
MODALITY: ABNORMAL
MONOCYTES # BLD AUTO: 1.98 10*3/MM3 (ref 0.1–0.9)
MONOCYTES NFR BLD AUTO: 10.9 % (ref 0–12)
NEUTROPHILS # BLD AUTO: 15.57 10*3/MM3 (ref 1.4–6.5)
NEUTROPHILS NFR BLD AUTO: 85.4 % (ref 40–75)
OSMOLALITY SERPL CALC.SUM OF ELEC: 302.4 MOSM/KG (ref 273–305)
OXYHGB MFR BLDV: 89.7 % (ref 85–100)
PCO2 BLDA: 42.8 MM HG (ref 35–45)
PEEP RESPIRATORY: 5 CM[H2O]
PH BLDA: 7.44 PH UNITS (ref 7.35–7.45)
PLATELET # BLD AUTO: 141 10*3/MM3 (ref 130–400)
PMV BLD AUTO: 10.8 FL (ref 6–10)
PO2 BLDA: 55.5 MM HG (ref 80–100)
POTASSIUM BLD-SCNC: 4.5 MMOL/L (ref 3.5–5.3)
PROT SERPL-MCNC: 7 G/DL (ref 6–8)
RBC # BLD AUTO: 4.74 10*6/MM3 (ref 4.7–6.1)
SAO2 % BLDCOA: 91.3 % (ref 90–100)
SET MECH RESP RATE: 22
SODIUM BLD-SCNC: 146 MMOL/L (ref 135–153)
TOTAL RATE: 25 BREATHS/MINUTE
TRIGL SERPL-MCNC: 219 MG/DL (ref 0–150)
VENTILATOR MODE: AC
VLDLC SERPL-MCNC: 43.8 MG/DL
VT ON VENT VENT: 450 ML
WBC NRBC COR # BLD: 18.22 10*3/MM3 (ref 4.5–12.5)

## 2017-12-21 PROCEDURE — 82375 ASSAY CARBOXYHB QUANT: CPT | Performed by: INTERNAL MEDICINE

## 2017-12-21 PROCEDURE — 80061 LIPID PANEL: CPT | Performed by: INTERNAL MEDICINE

## 2017-12-21 PROCEDURE — 85025 COMPLETE CBC W/AUTO DIFF WBC: CPT | Performed by: INTERNAL MEDICINE

## 2017-12-21 PROCEDURE — 83050 HGB METHEMOGLOBIN QUAN: CPT | Performed by: INTERNAL MEDICINE

## 2017-12-21 PROCEDURE — 36600 WITHDRAWAL OF ARTERIAL BLOOD: CPT | Performed by: INTERNAL MEDICINE

## 2017-12-21 PROCEDURE — 93010 ELECTROCARDIOGRAM REPORT: CPT | Performed by: INTERNAL MEDICINE

## 2017-12-21 PROCEDURE — 82805 BLOOD GASES W/O2 SATURATION: CPT | Performed by: INTERNAL MEDICINE

## 2017-12-21 PROCEDURE — 80053 COMPREHEN METABOLIC PANEL: CPT | Performed by: INTERNAL MEDICINE

## 2017-12-22 LAB
CRP SERPL-MCNC: 32.11 MG/DL (ref 0–0.99)
DEPRECATED RDW RBC AUTO: 49.3 FL (ref 37–54)
ERYTHROCYTE [DISTWIDTH] IN BLOOD BY AUTOMATED COUNT: 13.4 % (ref 11.5–14.5)
HCT VFR BLD AUTO: 47.1 % (ref 42–52)
HGB BLD-MCNC: 15 G/DL (ref 14–18)
MCH RBC QN AUTO: 32.4 PG (ref 27–33)
MCHC RBC AUTO-ENTMCNC: 31.8 G/DL (ref 33–37)
MCV RBC AUTO: 101.7 FL (ref 80–94)
PLATELET # BLD AUTO: 119 10*3/MM3 (ref 130–400)
PMV BLD AUTO: 10.9 FL (ref 6–10)
PREALB SERPL-MCNC: 13 MG/DL (ref 10–36)
RBC # BLD AUTO: 4.63 10*6/MM3 (ref 4.7–6.1)
WBC NRBC COR # BLD: 12.23 10*3/MM3 (ref 4.5–12.5)

## 2017-12-22 PROCEDURE — 86140 C-REACTIVE PROTEIN: CPT | Performed by: INTERNAL MEDICINE

## 2017-12-22 PROCEDURE — 85027 COMPLETE CBC AUTOMATED: CPT | Performed by: INTERNAL MEDICINE

## 2017-12-23 PROCEDURE — 93005 ELECTROCARDIOGRAM TRACING: CPT | Performed by: FAMILY MEDICINE

## 2017-12-23 PROCEDURE — 93010 ELECTROCARDIOGRAM REPORT: CPT | Performed by: INTERNAL MEDICINE

## 2017-12-24 ENCOUNTER — APPOINTMENT (OUTPATIENT)
Dept: GENERAL RADIOLOGY | Facility: HOSPITAL | Age: 72
End: 2017-12-24

## 2017-12-24 LAB
AMORPH URATE CRY URNS QL MICRO: ABNORMAL /HPF
BACTERIA UR QL AUTO: ABNORMAL /HPF
BILIRUB UR QL STRIP: NEGATIVE
CLARITY UR: ABNORMAL
COLOR UR: ABNORMAL
CRP SERPL-MCNC: 25.9 MG/DL (ref 0–0.99)
DEPRECATED RDW RBC AUTO: 52.9 FL (ref 37–54)
ERYTHROCYTE [DISTWIDTH] IN BLOOD BY AUTOMATED COUNT: 14.2 % (ref 11.5–14.5)
GLUCOSE UR STRIP-MCNC: NEGATIVE MG/DL
GRAN CASTS URNS QL MICRO: ABNORMAL /LPF
HCT VFR BLD AUTO: 48.2 % (ref 42–52)
HGB BLD-MCNC: 15.1 G/DL (ref 14–18)
HGB UR QL STRIP.AUTO: ABNORMAL
HYALINE CASTS UR QL AUTO: ABNORMAL /LPF
HYPOCHROMIA BLD QL: ABNORMAL
KETONES UR QL STRIP: NEGATIVE
LEUKOCYTE ESTERASE UR QL STRIP.AUTO: NEGATIVE
LYMPHOCYTES # BLD MANUAL: 1.07 10*3/MM3 (ref 1–3)
LYMPHOCYTES NFR BLD MANUAL: 7 % (ref 16–46)
LYMPHOCYTES NFR BLD MANUAL: 8 % (ref 0–12)
MACROCYTES BLD QL SMEAR: ABNORMAL
MCH RBC QN AUTO: 31.7 PG (ref 27–33)
MCHC RBC AUTO-ENTMCNC: 31.3 G/DL (ref 33–37)
MCV RBC AUTO: 101.3 FL (ref 80–94)
MONOCYTES # BLD AUTO: 1.22 10*3/MM3 (ref 0.1–0.9)
NEUTROPHILS # BLD AUTO: 12.94 10*3/MM3 (ref 1.4–6.5)
NEUTROPHILS NFR BLD MANUAL: 85 % (ref 40–75)
NITRITE UR QL STRIP: NEGATIVE
PH UR STRIP.AUTO: <=5 [PH] (ref 5–8)
PLAT MORPH BLD: NORMAL
PLATELET # BLD AUTO: 148 10*3/MM3 (ref 130–400)
PMV BLD AUTO: 11.6 FL (ref 6–10)
PROT UR QL STRIP: ABNORMAL
RBC # BLD AUTO: 4.76 10*6/MM3 (ref 4.7–6.1)
RBC # UR: ABNORMAL /HPF
REF LAB TEST METHOD: ABNORMAL
SCAN SLIDE: NORMAL
SP GR UR STRIP: 1.02 (ref 1–1.03)
SQUAMOUS #/AREA URNS HPF: ABNORMAL /HPF
UROBILINOGEN UR QL STRIP: ABNORMAL
WBC NRBC COR # BLD: 15.22 10*3/MM3 (ref 4.5–12.5)
WBC UR QL AUTO: ABNORMAL /HPF

## 2017-12-24 PROCEDURE — 87040 BLOOD CULTURE FOR BACTERIA: CPT | Performed by: INTERNAL MEDICINE

## 2017-12-24 PROCEDURE — 85007 BL SMEAR W/DIFF WBC COUNT: CPT | Performed by: PHYSICIAN ASSISTANT

## 2017-12-24 PROCEDURE — 85025 COMPLETE CBC W/AUTO DIFF WBC: CPT | Performed by: PHYSICIAN ASSISTANT

## 2017-12-24 PROCEDURE — 87186 SC STD MICRODIL/AGAR DIL: CPT | Performed by: INTERNAL MEDICINE

## 2017-12-24 PROCEDURE — 87205 SMEAR GRAM STAIN: CPT | Performed by: INTERNAL MEDICINE

## 2017-12-24 PROCEDURE — 81001 URINALYSIS AUTO W/SCOPE: CPT | Performed by: INTERNAL MEDICINE

## 2017-12-24 PROCEDURE — 87070 CULTURE OTHR SPECIMN AEROBIC: CPT | Performed by: INTERNAL MEDICINE

## 2017-12-24 PROCEDURE — 87077 CULTURE AEROBIC IDENTIFY: CPT | Performed by: INTERNAL MEDICINE

## 2017-12-24 PROCEDURE — 71010 HC CHEST PA OR AP: CPT

## 2017-12-24 PROCEDURE — 86140 C-REACTIVE PROTEIN: CPT | Performed by: PHYSICIAN ASSISTANT

## 2017-12-24 PROCEDURE — 71010 XR CHEST 1 VW: CPT | Performed by: RADIOLOGY

## 2017-12-24 PROCEDURE — 87493 C DIFF AMPLIFIED PROBE: CPT | Performed by: PHYSICIAN ASSISTANT

## 2017-12-25 LAB
027 TOXIN: NORMAL
BACTERIA SPEC AEROBE CULT: NORMAL
BACTERIA SPEC AEROBE CULT: NORMAL
BACTERIA SPEC RESP CULT: ABNORMAL
C DIFF TOX GENS STL QL NAA+PROBE: NEGATIVE
CRP SERPL-MCNC: 16.6 MG/DL (ref 0–0.99)
DEPRECATED RDW RBC AUTO: 56.7 FL (ref 37–54)
ERYTHROCYTE [DISTWIDTH] IN BLOOD BY AUTOMATED COUNT: 14.9 % (ref 11.5–14.5)
GRAM STN SPEC: ABNORMAL
HCT VFR BLD AUTO: 45.2 % (ref 42–52)
HGB BLD-MCNC: 13.8 G/DL (ref 14–18)
LYMPHOCYTES # BLD MANUAL: 2.15 10*3/MM3 (ref 1–3)
LYMPHOCYTES NFR BLD MANUAL: 12 % (ref 16–46)
LYMPHOCYTES NFR BLD MANUAL: 7 % (ref 0–12)
MCH RBC QN AUTO: 31.4 PG (ref 27–33)
MCHC RBC AUTO-ENTMCNC: 30.5 G/DL (ref 33–37)
MCV RBC AUTO: 103 FL (ref 80–94)
MONOCYTES # BLD AUTO: 1.25 10*3/MM3 (ref 0.1–0.9)
NEUTROPHILS # BLD AUTO: 14.49 10*3/MM3 (ref 1.4–6.5)
NEUTROPHILS NFR BLD MANUAL: 80 % (ref 40–75)
NEUTS BAND NFR BLD MANUAL: 1 % (ref 0–8)
PLAT MORPH BLD: NORMAL
PLATELET # BLD AUTO: 141 10*3/MM3 (ref 130–400)
PMV BLD AUTO: 11.8 FL (ref 6–10)
RBC # BLD AUTO: 4.39 10*6/MM3 (ref 4.7–6.1)
RBC MORPH BLD: NORMAL
VANCOMYCIN TROUGH SERPL-MCNC: 58.3 MCG/ML (ref 5–15)
WBC NRBC COR # BLD: 17.89 10*3/MM3 (ref 4.5–12.5)

## 2017-12-25 PROCEDURE — 80202 ASSAY OF VANCOMYCIN: CPT | Performed by: INTERNAL MEDICINE

## 2017-12-25 PROCEDURE — 85007 BL SMEAR W/DIFF WBC COUNT: CPT | Performed by: INTERNAL MEDICINE

## 2017-12-25 PROCEDURE — 86140 C-REACTIVE PROTEIN: CPT | Performed by: INTERNAL MEDICINE

## 2017-12-25 PROCEDURE — 85027 COMPLETE CBC AUTOMATED: CPT | Performed by: INTERNAL MEDICINE

## 2017-12-26 ENCOUNTER — APPOINTMENT (OUTPATIENT)
Dept: GENERAL RADIOLOGY | Facility: HOSPITAL | Age: 72
End: 2017-12-26

## 2017-12-26 ENCOUNTER — APPOINTMENT (OUTPATIENT)
Dept: ULTRASOUND IMAGING | Facility: HOSPITAL | Age: 72
End: 2017-12-26
Attending: INTERNAL MEDICINE

## 2017-12-26 LAB
A-A DO2: >300 MMHG (ref 0–300)
ALBUMIN SERPL-MCNC: 2.7 G/DL (ref 3.4–4.8)
ALBUMIN SERPL-MCNC: 3 G/DL (ref 3.4–4.8)
ALBUMIN/GLOB SERPL: 0.8 G/DL (ref 1.5–2.5)
ALBUMIN/GLOB SERPL: 0.8 G/DL (ref 1.5–2.5)
ALP SERPL-CCNC: 88 U/L (ref 40–129)
ALP SERPL-CCNC: 97 U/L (ref 40–129)
ALT SERPL W P-5'-P-CCNC: 30 U/L (ref 10–44)
ALT SERPL W P-5'-P-CCNC: 32 U/L (ref 10–44)
ANION GAP SERPL CALCULATED.3IONS-SCNC: 15 MMOL/L (ref 3.6–11.2)
ANION GAP SERPL CALCULATED.3IONS-SCNC: 17.4 MMOL/L (ref 3.6–11.2)
APTT PPP: 31.6 SECONDS (ref 23.8–36.1)
APTT PPP: 48.5 SECONDS (ref 23.8–36.1)
APTT PPP: 88.9 SECONDS (ref 23.8–36.1)
ARTERIAL PATENCY WRIST A: ABNORMAL
ARTERIAL PATENCY WRIST A: POSITIVE
AST SERPL-CCNC: 29 U/L (ref 10–34)
AST SERPL-CCNC: 32 U/L (ref 10–34)
ATMOSPHERIC PRESS: 734 MMHG
BASE EXCESS BLDA CALC-SCNC: -5.1 MMOL/L
BASE EXCESS BLDA CALC-SCNC: -5.5 MMOL/L
BASE EXCESS BLDA CALC-SCNC: -7 MMOL/L
BASE EXCESS BLDA CALC-SCNC: -7.8 MMOL/L
BDY SITE: ABNORMAL
BILIRUB SERPL-MCNC: 0.3 MG/DL (ref 0.2–1.8)
BILIRUB SERPL-MCNC: 0.6 MG/DL (ref 0.2–1.8)
BODY TEMPERATURE: 98.6 C
BUN BLD-MCNC: 252 MG/DL (ref 7–21)
BUN BLD-MCNC: 267 MG/DL (ref 7–21)
BUN/CREAT SERPL: 33.3 (ref 7–25)
BUN/CREAT SERPL: 34.8 (ref 7–25)
CALCIUM SPEC-SCNC: 8.9 MG/DL (ref 7.7–10)
CALCIUM SPEC-SCNC: 9.3 MG/DL (ref 7.7–10)
CHLORIDE SERPL-SCNC: 105 MMOL/L (ref 99–112)
CHLORIDE SERPL-SCNC: 106 MMOL/L (ref 99–112)
CK MB SERPL-CCNC: 10.84 NG/ML (ref 0–5)
CK SERPL-CCNC: 737 U/L (ref 24–204)
CO2 SERPL-SCNC: 20.6 MMOL/L (ref 24.3–31.9)
CO2 SERPL-SCNC: 28 MMOL/L (ref 24.3–31.9)
COHGB MFR BLD: 1 % (ref 0–5)
COHGB MFR BLD: 1.1 % (ref 0–5)
COHGB MFR BLD: 1.3 % (ref 0–5)
COHGB MFR BLD: 1.6 % (ref 0–5)
CREAT BLD-MCNC: 7.56 MG/DL (ref 0.43–1.29)
CREAT BLD-MCNC: 7.68 MG/DL (ref 0.43–1.29)
CRP SERPL-MCNC: 9.5 MG/DL (ref 0–0.99)
D-LACTATE SERPL-SCNC: 1.9 MMOL/L (ref 0.5–2)
DEPRECATED RDW RBC AUTO: 56 FL (ref 37–54)
ERYTHROCYTE [DISTWIDTH] IN BLOOD BY AUTOMATED COUNT: 14.9 % (ref 11.5–14.5)
GFR SERPL CREATININE-BSD FRML MDRD: 7 ML/MIN/1.73
GFR SERPL CREATININE-BSD FRML MDRD: 7 ML/MIN/1.73
GLOBULIN UR ELPH-MCNC: 3.3 GM/DL
GLOBULIN UR ELPH-MCNC: 3.7 GM/DL
GLUCOSE BLD-MCNC: 122 MG/DL (ref 70–110)
GLUCOSE BLD-MCNC: 183 MG/DL (ref 70–110)
HAV IGM SERPL QL IA: NORMAL
HBV CORE IGM SERPL QL IA: NORMAL
HBV SURFACE AG SERPL QL IA: NORMAL
HCO3 BLDA-SCNC: 21.8 MMOL/L (ref 22–26)
HCO3 BLDA-SCNC: 23.2 MMOL/L (ref 22–26)
HCO3 BLDA-SCNC: 23.9 MMOL/L (ref 22–26)
HCO3 BLDA-SCNC: 25.3 MMOL/L (ref 22–26)
HCT VFR BLD AUTO: 47.5 % (ref 42–52)
HCT VFR BLD CALC: 41 % (ref 42–52)
HCT VFR BLD CALC: 44 % (ref 42–52)
HCT VFR BLD CALC: 46 % (ref 42–52)
HCT VFR BLD CALC: 46 % (ref 42–52)
HCV AB SER DONR QL: NORMAL
HGB BLD-MCNC: 14.5 G/DL (ref 14–18)
HGB BLDA-MCNC: 14.1 G/DL (ref 12–16)
HGB BLDA-MCNC: 14.8 G/DL (ref 12–16)
HGB BLDA-MCNC: 15.8 G/DL (ref 12–16)
HGB BLDA-MCNC: 15.8 G/DL (ref 12–16)
HOROWITZ INDEX BLD+IHG-RTO: 100 %
HOROWITZ INDEX BLD+IHG-RTO: 70 %
MCH RBC QN AUTO: 31.3 PG (ref 27–33)
MCHC RBC AUTO-ENTMCNC: 30.5 G/DL (ref 33–37)
MCV RBC AUTO: 102.4 FL (ref 80–94)
METHGB BLD QL: 0.6 % (ref 0–3)
MODALITY: ABNORMAL
OSMOLALITY SERPL CALC.SUM OF ELEC: 373.6 MOSM/KG (ref 273–305)
OSMOLALITY SERPL CALC.SUM OF ELEC: 389.8 MOSM/KG (ref 273–305)
OXYHGB MFR BLDV: 88.8 % (ref 85–100)
OXYHGB MFR BLDV: 89.8 % (ref 85–100)
OXYHGB MFR BLDV: 95.9 % (ref 85–100)
OXYHGB MFR BLDV: 97.4 % (ref 85–100)
PCO2 BLDA: 59.3 MM HG (ref 35–45)
PCO2 BLDA: 61.5 MM HG (ref 35–45)
PCO2 BLDA: 72.2 MM HG (ref 35–45)
PCO2 BLDA: 73 MM HG (ref 35–45)
PEEP RESPIRATORY: 10 CM[H2O]
PH BLDA: 7.13 PH UNITS (ref 7.35–7.45)
PH BLDA: 7.16 PH UNITS (ref 7.35–7.45)
PH BLDA: 7.17 PH UNITS (ref 7.35–7.45)
PH BLDA: 7.21 PH UNITS (ref 7.35–7.45)
PLATELET # BLD AUTO: 155 10*3/MM3 (ref 130–400)
PMV BLD AUTO: 12.2 FL (ref 6–10)
PO2 BLDA: 104.8 MM HG (ref 80–100)
PO2 BLDA: 161.3 MM HG (ref 80–100)
PO2 BLDA: 67.8 MM HG (ref 80–100)
PO2 BLDA: 68.7 MM HG (ref 80–100)
POTASSIUM BLD-SCNC: 4.9 MMOL/L (ref 3.5–5.3)
POTASSIUM BLD-SCNC: 8.2 MMOL/L (ref 3.5–5.3)
POTASSIUM BLD-SCNC: 9.4 MMOL/L (ref 3.5–5.3)
PROT SERPL-MCNC: 6 G/DL (ref 6–8)
PROT SERPL-MCNC: 6.7 G/DL (ref 6–8)
RBC # BLD AUTO: 4.64 10*6/MM3 (ref 4.7–6.1)
SAO2 % BLDCOA: 90.5 % (ref 90–100)
SAO2 % BLDCOA: 91.8 % (ref 90–100)
SAO2 % BLDCOA: 97.5 % (ref 90–100)
SAO2 % BLDCOA: 99.1 % (ref 90–100)
SET MECH RESP RATE: 22
SET MECH RESP RATE: 25
SET MECH RESP RATE: 28
SET MECH RESP RATE: 28
SODIUM BLD-SCNC: 144 MMOL/L (ref 135–153)
SODIUM BLD-SCNC: 148 MMOL/L (ref 135–153)
VANCOMYCIN SERPL-MCNC: 58.8 MCG/ML
VENTILATOR MODE: AC
VT ON VENT VENT: 450 ML
VT ON VENT VENT: 500 ML
WBC NRBC COR # BLD: 19.16 10*3/MM3 (ref 4.5–12.5)

## 2017-12-26 PROCEDURE — 85027 COMPLETE CBC AUTOMATED: CPT | Performed by: NURSE PRACTITIONER

## 2017-12-26 PROCEDURE — 83050 HGB METHEMOGLOBIN QUAN: CPT | Performed by: INTERNAL MEDICINE

## 2017-12-26 PROCEDURE — 82550 ASSAY OF CK (CPK): CPT | Performed by: INTERNAL MEDICINE

## 2017-12-26 PROCEDURE — 83605 ASSAY OF LACTIC ACID: CPT | Performed by: INTERNAL MEDICINE

## 2017-12-26 PROCEDURE — 36600 WITHDRAWAL OF ARTERIAL BLOOD: CPT | Performed by: INTERNAL MEDICINE

## 2017-12-26 PROCEDURE — 84132 ASSAY OF SERUM POTASSIUM: CPT | Performed by: INTERNAL MEDICINE

## 2017-12-26 PROCEDURE — 85730 THROMBOPLASTIN TIME PARTIAL: CPT | Performed by: INTERNAL MEDICINE

## 2017-12-26 PROCEDURE — 71010 XR CHEST 1 VW: CPT | Performed by: RADIOLOGY

## 2017-12-26 PROCEDURE — 93925 LOWER EXTREMITY STUDY: CPT | Performed by: RADIOLOGY

## 2017-12-26 PROCEDURE — 80053 COMPREHEN METABOLIC PANEL: CPT | Performed by: INTERNAL MEDICINE

## 2017-12-26 PROCEDURE — 82805 BLOOD GASES W/O2 SATURATION: CPT | Performed by: INTERNAL MEDICINE

## 2017-12-26 PROCEDURE — 82375 ASSAY CARBOXYHB QUANT: CPT | Performed by: INTERNAL MEDICINE

## 2017-12-26 PROCEDURE — 93923 UPR/LXTR ART STDY 3+ LVLS: CPT

## 2017-12-26 PROCEDURE — 86140 C-REACTIVE PROTEIN: CPT | Performed by: NURSE PRACTITIONER

## 2017-12-26 PROCEDURE — 82553 CREATINE MB FRACTION: CPT | Performed by: INTERNAL MEDICINE

## 2017-12-26 PROCEDURE — 80202 ASSAY OF VANCOMYCIN: CPT | Performed by: INTERNAL MEDICINE

## 2017-12-26 PROCEDURE — 80074 ACUTE HEPATITIS PANEL: CPT | Performed by: INTERNAL MEDICINE

## 2017-12-26 PROCEDURE — 93010 ELECTROCARDIOGRAM REPORT: CPT | Performed by: INTERNAL MEDICINE

## 2017-12-26 PROCEDURE — 93005 ELECTROCARDIOGRAM TRACING: CPT | Performed by: INTERNAL MEDICINE

## 2017-12-26 PROCEDURE — 71010 HC CHEST PA OR AP: CPT

## 2017-12-27 ENCOUNTER — APPOINTMENT (OUTPATIENT)
Dept: ULTRASOUND IMAGING | Facility: HOSPITAL | Age: 72
End: 2017-12-27

## 2017-12-27 LAB
ALBUMIN SERPL-MCNC: 2.4 G/DL (ref 3.4–4.8)
ALBUMIN SERPL-MCNC: 2.4 G/DL (ref 3.4–4.8)
ALBUMIN/GLOB SERPL: 0.8 G/DL (ref 1.5–2.5)
ALBUMIN/GLOB SERPL: 0.8 G/DL (ref 1.5–2.5)
ALP SERPL-CCNC: 67 U/L (ref 40–129)
ALP SERPL-CCNC: 75 U/L (ref 40–129)
ALT SERPL W P-5'-P-CCNC: 31 U/L (ref 10–44)
ALT SERPL W P-5'-P-CCNC: 36 U/L (ref 10–44)
ANION GAP SERPL CALCULATED.3IONS-SCNC: 11.7 MMOL/L (ref 3.6–11.2)
ANION GAP SERPL CALCULATED.3IONS-SCNC: 14 MMOL/L (ref 3.6–11.2)
APTT PPP: 46.2 SECONDS (ref 23.8–36.1)
APTT PPP: 50.1 SECONDS (ref 23.8–36.1)
APTT PPP: 50.9 SECONDS (ref 23.8–36.1)
APTT PPP: 63.9 SECONDS (ref 23.8–36.1)
AST SERPL-CCNC: 47 U/L (ref 10–34)
AST SERPL-CCNC: 88 U/L (ref 10–34)
BACTERIA UR QL AUTO: ABNORMAL /HPF
BASOPHILS # BLD AUTO: 0.02 10*3/MM3 (ref 0–0.3)
BASOPHILS NFR BLD AUTO: 0.2 % (ref 0–2)
BILIRUB SERPL-MCNC: 0.3 MG/DL (ref 0.2–1.8)
BILIRUB SERPL-MCNC: 0.5 MG/DL (ref 0.2–1.8)
BILIRUB UR QL STRIP: NEGATIVE
BUN BLD-MCNC: 207 MG/DL (ref 7–21)
BUN BLD-MCNC: 64 MG/DL (ref 7–21)
BUN/CREAT SERPL: 24 (ref 7–25)
BUN/CREAT SERPL: 33.5 (ref 7–25)
CALCIUM SPEC-SCNC: 7.7 MG/DL (ref 7.7–10)
CALCIUM SPEC-SCNC: 7.8 MG/DL (ref 7.7–10)
CHLORIDE SERPL-SCNC: 101 MMOL/L (ref 99–112)
CHLORIDE SERPL-SCNC: 101 MMOL/L (ref 99–112)
CLARITY UR: ABNORMAL
CO2 SERPL-SCNC: 30 MMOL/L (ref 24.3–31.9)
CO2 SERPL-SCNC: 32.3 MMOL/L (ref 24.3–31.9)
COLOR UR: YELLOW
CREAT BLD-MCNC: 2.67 MG/DL (ref 0.43–1.29)
CREAT BLD-MCNC: 6.17 MG/DL (ref 0.43–1.29)
CREAT UR-MCNC: 37.9 MG/DL
CRP SERPL-MCNC: 7.21 MG/DL (ref 0–0.99)
DEPRECATED RDW RBC AUTO: 52.4 FL (ref 37–54)
EOSINOPHIL # BLD AUTO: 0.06 10*3/MM3 (ref 0–0.7)
EOSINOPHIL NFR BLD AUTO: 0.5 % (ref 0–7)
ERYTHROCYTE [DISTWIDTH] IN BLOOD BY AUTOMATED COUNT: 14.4 % (ref 11.5–14.5)
GFR SERPL CREATININE-BSD FRML MDRD: 24 ML/MIN/1.73
GFR SERPL CREATININE-BSD FRML MDRD: 9 ML/MIN/1.73
GLOBULIN UR ELPH-MCNC: 2.9 GM/DL
GLOBULIN UR ELPH-MCNC: 2.9 GM/DL
GLUCOSE BLD-MCNC: 167 MG/DL (ref 70–110)
GLUCOSE BLD-MCNC: 83 MG/DL (ref 70–110)
GLUCOSE UR STRIP-MCNC: NEGATIVE MG/DL
HCT VFR BLD AUTO: 38.7 % (ref 42–52)
HGB BLD-MCNC: 12.2 G/DL (ref 14–18)
HGB UR QL STRIP.AUTO: ABNORMAL
HYALINE CASTS UR QL AUTO: ABNORMAL /LPF
IMM GRANULOCYTES # BLD: 0.07 10*3/MM3 (ref 0–0.03)
IMM GRANULOCYTES NFR BLD: 0.5 % (ref 0–0.5)
KETONES UR QL STRIP: NEGATIVE
LEUKOCYTE ESTERASE UR QL STRIP.AUTO: ABNORMAL
LYMPHOCYTES # BLD AUTO: 0.61 10*3/MM3 (ref 1–3)
LYMPHOCYTES NFR BLD AUTO: 4.6 % (ref 16–46)
MAGNESIUM SERPL-MCNC: 3.5 MG/DL (ref 1.7–2.6)
MCH RBC QN AUTO: 31.9 PG (ref 27–33)
MCHC RBC AUTO-ENTMCNC: 31.5 G/DL (ref 33–37)
MCV RBC AUTO: 101 FL (ref 80–94)
MONOCYTES # BLD AUTO: 0.49 10*3/MM3 (ref 0.1–0.9)
MONOCYTES NFR BLD AUTO: 3.7 % (ref 0–12)
NEUTROPHILS # BLD AUTO: 11.95 10*3/MM3 (ref 1.4–6.5)
NEUTROPHILS NFR BLD AUTO: 90.5 % (ref 40–75)
NITRITE UR QL STRIP: NEGATIVE
OSMOLALITY SERPL CALC.SUM OF ELEC: 306.2 MOSM/KG (ref 273–305)
OSMOLALITY SERPL CALC.SUM OF ELEC: 361.9 MOSM/KG (ref 273–305)
PH UR STRIP.AUTO: <=5 [PH] (ref 5–8)
PHOSPHATE SERPL-MCNC: 10.1 MG/DL (ref 2.7–4.5)
PLATELET # BLD AUTO: 131 10*3/MM3 (ref 130–400)
PMV BLD AUTO: 12.2 FL (ref 6–10)
POTASSIUM BLD-SCNC: 4.4 MMOL/L (ref 3.5–5.3)
POTASSIUM BLD-SCNC: 5.9 MMOL/L (ref 3.5–5.3)
POTASSIUM BLD-SCNC: 6.6 MMOL/L (ref 3.5–5.3)
PROT SERPL-MCNC: 5.3 G/DL (ref 6–8)
PROT SERPL-MCNC: 5.3 G/DL (ref 6–8)
PROT UR QL STRIP: ABNORMAL
RBC # BLD AUTO: 3.83 10*6/MM3 (ref 4.7–6.1)
RBC # UR: ABNORMAL /HPF
REF LAB TEST METHOD: ABNORMAL
SODIUM BLD-SCNC: 145 MMOL/L (ref 135–153)
SODIUM BLD-SCNC: 145 MMOL/L (ref 135–153)
SODIUM UR-SCNC: 55 MMOL/L
SP GR UR STRIP: 1.01 (ref 1–1.03)
SQUAMOUS #/AREA URNS HPF: ABNORMAL /HPF
UROBILINOGEN UR QL STRIP: ABNORMAL
WBC NRBC COR # BLD: 13.2 10*3/MM3 (ref 4.5–12.5)
WBC UR QL AUTO: ABNORMAL /HPF

## 2017-12-27 PROCEDURE — 85730 THROMBOPLASTIN TIME PARTIAL: CPT | Performed by: INTERNAL MEDICINE

## 2017-12-27 PROCEDURE — 85025 COMPLETE CBC W/AUTO DIFF WBC: CPT | Performed by: INTERNAL MEDICINE

## 2017-12-27 PROCEDURE — 87086 URINE CULTURE/COLONY COUNT: CPT | Performed by: INTERNAL MEDICINE

## 2017-12-27 PROCEDURE — 80053 COMPREHEN METABOLIC PANEL: CPT | Performed by: INTERNAL MEDICINE

## 2017-12-27 PROCEDURE — 93010 ELECTROCARDIOGRAM REPORT: CPT | Performed by: INTERNAL MEDICINE

## 2017-12-27 PROCEDURE — 84300 ASSAY OF URINE SODIUM: CPT | Performed by: INTERNAL MEDICINE

## 2017-12-27 PROCEDURE — 81001 URINALYSIS AUTO W/SCOPE: CPT | Performed by: INTERNAL MEDICINE

## 2017-12-27 PROCEDURE — 84100 ASSAY OF PHOSPHORUS: CPT | Performed by: INTERNAL MEDICINE

## 2017-12-27 PROCEDURE — 84132 ASSAY OF SERUM POTASSIUM: CPT | Performed by: INTERNAL MEDICINE

## 2017-12-27 PROCEDURE — 93005 ELECTROCARDIOGRAM TRACING: CPT | Performed by: INTERNAL MEDICINE

## 2017-12-27 PROCEDURE — 82570 ASSAY OF URINE CREATININE: CPT | Performed by: INTERNAL MEDICINE

## 2017-12-27 PROCEDURE — 76775 US EXAM ABDO BACK WALL LIM: CPT | Performed by: RADIOLOGY

## 2017-12-27 PROCEDURE — 83735 ASSAY OF MAGNESIUM: CPT | Performed by: INTERNAL MEDICINE

## 2017-12-27 PROCEDURE — 86140 C-REACTIVE PROTEIN: CPT | Performed by: INTERNAL MEDICINE

## 2017-12-27 PROCEDURE — 76775 US EXAM ABDO BACK WALL LIM: CPT

## 2017-12-28 LAB
BACTERIA SPEC RESP CULT: ABNORMAL
GRAM STN SPEC: ABNORMAL

## 2017-12-29 LAB
BACTERIA SPEC AEROBE CULT: NO GROWTH
BACTERIA SPEC AEROBE CULT: NORMAL
BACTERIA SPEC AEROBE CULT: NORMAL
BACTERIA SPEC RESP CULT: ABNORMAL
GRAM STN SPEC: ABNORMAL
GRAM STN SPEC: ABNORMAL